# Patient Record
Sex: FEMALE | Race: WHITE | NOT HISPANIC OR LATINO
[De-identification: names, ages, dates, MRNs, and addresses within clinical notes are randomized per-mention and may not be internally consistent; named-entity substitution may affect disease eponyms.]

---

## 2021-09-29 ENCOUNTER — APPOINTMENT (OUTPATIENT)
Dept: GASTROENTEROLOGY | Facility: CLINIC | Age: 46
End: 2021-09-29
Payer: COMMERCIAL

## 2021-09-29 VITALS
WEIGHT: 177 LBS | SYSTOLIC BLOOD PRESSURE: 119 MMHG | HEART RATE: 63 BPM | HEIGHT: 63 IN | DIASTOLIC BLOOD PRESSURE: 74 MMHG | BODY MASS INDEX: 31.36 KG/M2

## 2021-09-29 PROCEDURE — 99203 OFFICE O/P NEW LOW 30 MIN: CPT

## 2021-09-29 NOTE — HISTORY OF PRESENT ILLNESS
[de-identified] : Ms. BOUCHRA PALACIOS is a 45 year old female with history of epigastric discomfort and heartburn which has been new in onset. Patient has a history of heavy NSAID use in the past. Patient has noted dyspepsia and occasional radiation of the pain to the back. Patient was started on pantoprazole with moderate improvement in symptoms although the symptoms do persist. There is no true dysphagia or weight loss. No bowel changes associated. Tomato Sauce does make symptoms worse.\par

## 2021-09-29 NOTE — ASSESSMENT
[FreeTextEntry1] : 44 yo female with epigastric discomfort. Will continue PPIs, lifestyle modifications, abdominal ultrasound, upper endoscopy.

## 2021-09-30 ENCOUNTER — APPOINTMENT (OUTPATIENT)
Dept: ULTRASOUND IMAGING | Facility: CLINIC | Age: 46
End: 2021-09-30
Payer: COMMERCIAL

## 2021-09-30 ENCOUNTER — OUTPATIENT (OUTPATIENT)
Dept: OUTPATIENT SERVICES | Facility: HOSPITAL | Age: 46
LOS: 1 days | End: 2021-09-30
Payer: COMMERCIAL

## 2021-09-30 DIAGNOSIS — R10.13 EPIGASTRIC PAIN: ICD-10-CM

## 2021-09-30 PROCEDURE — 76700 US EXAM ABDOM COMPLETE: CPT | Mod: 26

## 2021-09-30 PROCEDURE — 76700 US EXAM ABDOM COMPLETE: CPT

## 2021-10-07 ENCOUNTER — APPOINTMENT (OUTPATIENT)
Dept: CT IMAGING | Facility: CLINIC | Age: 46
End: 2021-10-07
Payer: COMMERCIAL

## 2021-10-07 ENCOUNTER — OUTPATIENT (OUTPATIENT)
Dept: OUTPATIENT SERVICES | Facility: HOSPITAL | Age: 46
LOS: 1 days | End: 2021-10-07
Payer: COMMERCIAL

## 2021-10-07 DIAGNOSIS — Z00.8 ENCOUNTER FOR OTHER GENERAL EXAMINATION: ICD-10-CM

## 2021-10-07 DIAGNOSIS — R93.3 ABNORMAL FINDINGS ON DIAGNOSTIC IMAGING OF OTHER PARTS OF DIGESTIVE TRACT: ICD-10-CM

## 2021-10-07 PROCEDURE — 74177 CT ABD & PELVIS W/CONTRAST: CPT

## 2021-10-07 PROCEDURE — 74177 CT ABD & PELVIS W/CONTRAST: CPT | Mod: 26

## 2021-10-10 ENCOUNTER — INPATIENT (INPATIENT)
Facility: HOSPITAL | Age: 46
LOS: 4 days | Discharge: HOME | DRG: 424 | End: 2021-10-15
Attending: INTERNAL MEDICINE | Admitting: INTERNAL MEDICINE
Payer: COMMERCIAL

## 2021-10-10 VITALS — WEIGHT: 177.91 LBS | HEIGHT: 63 IN

## 2021-10-10 LAB
ALBUMIN SERPL ELPH-MCNC: 3.7 G/DL — SIGNIFICANT CHANGE UP (ref 3.3–5)
ALP SERPL-CCNC: 77 U/L — SIGNIFICANT CHANGE UP (ref 40–120)
ALT FLD-CCNC: 32 U/L — SIGNIFICANT CHANGE UP (ref 12–78)
ANION GAP SERPL CALC-SCNC: 6 MMOL/L — SIGNIFICANT CHANGE UP (ref 5–17)
APTT BLD: 39.9 SEC — HIGH (ref 27.5–35.5)
AST SERPL-CCNC: 22 U/L — SIGNIFICANT CHANGE UP (ref 15–37)
BASOPHILS # BLD AUTO: 0.09 K/UL — SIGNIFICANT CHANGE UP (ref 0–0.2)
BASOPHILS NFR BLD AUTO: 1.1 % — SIGNIFICANT CHANGE UP (ref 0–2)
BILIRUB SERPL-MCNC: 0.6 MG/DL — SIGNIFICANT CHANGE UP (ref 0.2–1.2)
BUN SERPL-MCNC: 11 MG/DL — SIGNIFICANT CHANGE UP (ref 7–23)
CALCIUM SERPL-MCNC: 9.1 MG/DL — SIGNIFICANT CHANGE UP (ref 8.5–10.1)
CHLORIDE SERPL-SCNC: 103 MMOL/L — SIGNIFICANT CHANGE UP (ref 96–108)
CO2 SERPL-SCNC: 27 MMOL/L — SIGNIFICANT CHANGE UP (ref 22–31)
CREAT SERPL-MCNC: 0.69 MG/DL — SIGNIFICANT CHANGE UP (ref 0.5–1.3)
EOSINOPHIL # BLD AUTO: 0.5 K/UL — SIGNIFICANT CHANGE UP (ref 0–0.5)
EOSINOPHIL NFR BLD AUTO: 6.1 % — HIGH (ref 0–6)
GLUCOSE SERPL-MCNC: 119 MG/DL — HIGH (ref 70–99)
HCG SERPL-ACNC: 3 MIU/ML — SIGNIFICANT CHANGE UP
HCT VFR BLD CALC: 42.2 % — SIGNIFICANT CHANGE UP (ref 34.5–45)
HGB BLD-MCNC: 13.8 G/DL — SIGNIFICANT CHANGE UP (ref 11.5–15.5)
IMM GRANULOCYTES NFR BLD AUTO: 0.2 % — SIGNIFICANT CHANGE UP (ref 0–1.5)
INR BLD: 1.1 RATIO — SIGNIFICANT CHANGE UP (ref 0.88–1.16)
LIDOCAIN IGE QN: 119 U/L — SIGNIFICANT CHANGE UP (ref 73–393)
LYMPHOCYTES # BLD AUTO: 2.19 K/UL — SIGNIFICANT CHANGE UP (ref 1–3.3)
LYMPHOCYTES # BLD AUTO: 26.8 % — SIGNIFICANT CHANGE UP (ref 13–44)
MCHC RBC-ENTMCNC: 29.1 PG — SIGNIFICANT CHANGE UP (ref 27–34)
MCHC RBC-ENTMCNC: 32.7 GM/DL — SIGNIFICANT CHANGE UP (ref 32–36)
MCV RBC AUTO: 89 FL — SIGNIFICANT CHANGE UP (ref 80–100)
MONOCYTES # BLD AUTO: 0.6 K/UL — SIGNIFICANT CHANGE UP (ref 0–0.9)
MONOCYTES NFR BLD AUTO: 7.4 % — SIGNIFICANT CHANGE UP (ref 2–14)
NEUTROPHILS # BLD AUTO: 4.76 K/UL — SIGNIFICANT CHANGE UP (ref 1.8–7.4)
NEUTROPHILS NFR BLD AUTO: 58.4 % — SIGNIFICANT CHANGE UP (ref 43–77)
PLATELET # BLD AUTO: 299 K/UL — SIGNIFICANT CHANGE UP (ref 150–400)
POTASSIUM SERPL-MCNC: 3.5 MMOL/L — SIGNIFICANT CHANGE UP (ref 3.5–5.3)
POTASSIUM SERPL-SCNC: 3.5 MMOL/L — SIGNIFICANT CHANGE UP (ref 3.5–5.3)
PROT SERPL-MCNC: 7.7 GM/DL — SIGNIFICANT CHANGE UP (ref 6–8.3)
PROTHROM AB SERPL-ACNC: 12.7 SEC — SIGNIFICANT CHANGE UP (ref 10.6–13.6)
RBC # BLD: 4.74 M/UL — SIGNIFICANT CHANGE UP (ref 3.8–5.2)
RBC # FLD: 12.2 % — SIGNIFICANT CHANGE UP (ref 10.3–14.5)
SODIUM SERPL-SCNC: 136 MMOL/L — SIGNIFICANT CHANGE UP (ref 135–145)
WBC # BLD: 8.16 K/UL — SIGNIFICANT CHANGE UP (ref 3.8–10.5)
WBC # FLD AUTO: 8.16 K/UL — SIGNIFICANT CHANGE UP (ref 3.8–10.5)

## 2021-10-10 PROCEDURE — 99285 EMERGENCY DEPT VISIT HI MDM: CPT

## 2021-10-10 PROCEDURE — 93010 ELECTROCARDIOGRAM REPORT: CPT

## 2021-10-10 PROCEDURE — 71045 X-RAY EXAM CHEST 1 VIEW: CPT | Mod: 26

## 2021-10-10 RX ORDER — MORPHINE SULFATE 50 MG/1
8 CAPSULE, EXTENDED RELEASE ORAL ONCE
Refills: 0 | Status: DISCONTINUED | OUTPATIENT
Start: 2021-10-10 | End: 2021-10-10

## 2021-10-10 RX ORDER — ONDANSETRON 8 MG/1
4 TABLET, FILM COATED ORAL ONCE
Refills: 0 | Status: COMPLETED | OUTPATIENT
Start: 2021-10-10 | End: 2021-10-10

## 2021-10-10 RX ADMIN — MORPHINE SULFATE 8 MILLIGRAM(S): 50 CAPSULE, EXTENDED RELEASE ORAL at 23:21

## 2021-10-10 RX ADMIN — MORPHINE SULFATE 8 MILLIGRAM(S): 50 CAPSULE, EXTENDED RELEASE ORAL at 23:23

## 2021-10-10 RX ADMIN — ONDANSETRON 4 MILLIGRAM(S): 8 TABLET, FILM COATED ORAL at 23:21

## 2021-10-10 NOTE — ED PROVIDER NOTE - PROGRESS NOTE DETAILS
Discussed with Dr. Campbell, patient does not need repeat imaging, do not start anticoagulation, if deemed to be high risk lovenox may be started after procedure. Pt will be admitted for endoscopy tomorrow morning and will also be seen by surgeon for procedure likely later in the week. -Des Felton PA-C Discussed case with Dr. estrada who will admit. -Des Felton PA-C

## 2021-10-10 NOTE — ED PROVIDER NOTE - OBJECTIVE STATEMENT
44 y/o F presents with abdominal pain. Pt has been having upper abdominal, following w/ Dr. 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 Dr. Maldonado has been discussing with Dr. gregg for endoscopy and bx. Pt reports worsening LUQ pain since last night after trying to eat chicken, not relieved with vicodin. Denies fever/chills, v/d, CP, SOB, urinary sx or other complaints at this time. -Des Felton PA-C

## 2021-10-10 NOTE — ED PROVIDER NOTE - ATTENDING CONTRIBUTION TO CARE
Attending Contribution to Care: I, Katy Olivier, performed the initial face to face bedside interview with this patient regarding history of present illness, review of symptoms and relevant past medical, social and family history.  I completed an independent physical examination.  I was the initial provider who evaluated this patient. I have signed out the follow up of any pending tests (i.e. labs, radiological studies) to the ACP.  I have communicated the patient’s plan of care and disposition with the ACP.

## 2021-10-10 NOTE — ED PROVIDER NOTE - PHYSICAL EXAMINATION
Constitutional: uncomfortable appearing, AAOx3  Eyes: PERRLA EOMI  Head: NCAT  Mouth: MMM  Cardiac: s1s2. rrr  Lungs: CTA B/L. No accessory muscle use  Abd: soft, nd. ttp epigastric region and LUQ. no r/g/r

## 2021-10-10 NOTE — ED PROVIDER NOTE - CLINICAL SUMMARY MEDICAL DECISION MAKING FREE TEXT BOX
44 y/o F with known pancreatic mass on imagining from 10/7 presents with worsening abdominal pain, will discussed with GI, likely admit

## 2021-10-10 NOTE — ED ADULT TRIAGE NOTE - CHIEF COMPLAINT QUOTE
Pt presents to the ED c/o RUQ abdominal pain radiating to back and chest pressure since yesterday. Per patient, she got an ultrasound with Dr Maldonado on Thursday that found a mass on pancreas. Endorses nausea, denies vomiting and diarrhea.

## 2021-10-11 ENCOUNTER — RESULT REVIEW (OUTPATIENT)
Age: 46
End: 2021-10-11

## 2021-10-11 ENCOUNTER — NON-APPOINTMENT (OUTPATIENT)
Age: 46
End: 2021-10-11

## 2021-10-11 DIAGNOSIS — Z78.9 OTHER SPECIFIED HEALTH STATUS: Chronic | ICD-10-CM

## 2021-10-11 DIAGNOSIS — K86.89 OTHER SPECIFIED DISEASES OF PANCREAS: ICD-10-CM

## 2021-10-11 LAB
ANION GAP SERPL CALC-SCNC: 8 MMOL/L — SIGNIFICANT CHANGE UP (ref 5–17)
BUN SERPL-MCNC: 9 MG/DL — SIGNIFICANT CHANGE UP (ref 7–23)
CALCIUM SERPL-MCNC: 9.2 MG/DL — SIGNIFICANT CHANGE UP (ref 8.5–10.1)
CHLORIDE SERPL-SCNC: 105 MMOL/L — SIGNIFICANT CHANGE UP (ref 96–108)
CO2 SERPL-SCNC: 24 MMOL/L — SIGNIFICANT CHANGE UP (ref 22–31)
CREAT SERPL-MCNC: 0.69 MG/DL — SIGNIFICANT CHANGE UP (ref 0.5–1.3)
GLUCOSE SERPL-MCNC: 114 MG/DL — HIGH (ref 70–99)
HCT VFR BLD CALC: 42.3 % — SIGNIFICANT CHANGE UP (ref 34.5–45)
HGB BLD-MCNC: 13.9 G/DL — SIGNIFICANT CHANGE UP (ref 11.5–15.5)
MCHC RBC-ENTMCNC: 29.3 PG — SIGNIFICANT CHANGE UP (ref 27–34)
MCHC RBC-ENTMCNC: 32.9 GM/DL — SIGNIFICANT CHANGE UP (ref 32–36)
MCV RBC AUTO: 89.1 FL — SIGNIFICANT CHANGE UP (ref 80–100)
PLATELET # BLD AUTO: 256 K/UL — SIGNIFICANT CHANGE UP (ref 150–400)
POTASSIUM SERPL-MCNC: 4.1 MMOL/L — SIGNIFICANT CHANGE UP (ref 3.5–5.3)
POTASSIUM SERPL-SCNC: 4.1 MMOL/L — SIGNIFICANT CHANGE UP (ref 3.5–5.3)
RBC # BLD: 4.75 M/UL — SIGNIFICANT CHANGE UP (ref 3.8–5.2)
RBC # FLD: 12.2 % — SIGNIFICANT CHANGE UP (ref 10.3–14.5)
SARS-COV-2 RNA SPEC QL NAA+PROBE: SIGNIFICANT CHANGE UP
SODIUM SERPL-SCNC: 137 MMOL/L — SIGNIFICANT CHANGE UP (ref 135–145)
WBC # BLD: 7.77 K/UL — SIGNIFICANT CHANGE UP (ref 3.8–10.5)
WBC # FLD AUTO: 7.77 K/UL — SIGNIFICANT CHANGE UP (ref 3.8–10.5)

## 2021-10-11 PROCEDURE — 88307 TISSUE EXAM BY PATHOLOGIST: CPT

## 2021-10-11 PROCEDURE — A9579: CPT

## 2021-10-11 PROCEDURE — 74183 MRI ABD W/O CNTR FLWD CNTR: CPT | Mod: 26

## 2021-10-11 PROCEDURE — 86301 IMMUNOASSAY TUMOR CA 19-9: CPT

## 2021-10-11 PROCEDURE — 81025 URINE PREGNANCY TEST: CPT

## 2021-10-11 PROCEDURE — 88333 PATH CONSLTJ SURG CYTO XM 1: CPT | Mod: 26

## 2021-10-11 PROCEDURE — C9113: CPT

## 2021-10-11 PROCEDURE — 88305 TISSUE EXAM BY PATHOLOGIST: CPT

## 2021-10-11 PROCEDURE — 77001 FLUOROGUIDE FOR VEIN DEVICE: CPT

## 2021-10-11 PROCEDURE — 74183 MRI ABD W/O CNTR FLWD CNTR: CPT

## 2021-10-11 PROCEDURE — 71260 CT THORAX DX C+: CPT

## 2021-10-11 PROCEDURE — C1788: CPT

## 2021-10-11 PROCEDURE — 99222 1ST HOSP IP/OBS MODERATE 55: CPT | Mod: 25

## 2021-10-11 PROCEDURE — 71260 CT THORAX DX C+: CPT | Mod: 26

## 2021-10-11 PROCEDURE — 36561 INSERT TUNNELED CV CATH: CPT

## 2021-10-11 PROCEDURE — C1769: CPT

## 2021-10-11 PROCEDURE — 85027 COMPLETE CBC AUTOMATED: CPT

## 2021-10-11 PROCEDURE — U0005: CPT

## 2021-10-11 PROCEDURE — C1894: CPT

## 2021-10-11 PROCEDURE — U0003: CPT

## 2021-10-11 PROCEDURE — 76937 US GUIDE VASCULAR ACCESS: CPT

## 2021-10-11 PROCEDURE — 99223 1ST HOSP IP/OBS HIGH 75: CPT

## 2021-10-11 PROCEDURE — 88313 SPECIAL STAINS GROUP 2: CPT

## 2021-10-11 PROCEDURE — 86769 SARS-COV-2 COVID-19 ANTIBODY: CPT

## 2021-10-11 PROCEDURE — 88333 PATH CONSLTJ SURG CYTO XM 1: CPT

## 2021-10-11 PROCEDURE — 80048 BASIC METABOLIC PNL TOTAL CA: CPT

## 2021-10-11 PROCEDURE — 12345: CPT | Mod: NC,GC

## 2021-10-11 PROCEDURE — 36415 COLL VENOUS BLD VENIPUNCTURE: CPT

## 2021-10-11 PROCEDURE — 88172 CYTP DX EVAL FNA 1ST EA SITE: CPT

## 2021-10-11 PROCEDURE — 88313 SPECIAL STAINS GROUP 2: CPT | Mod: 26

## 2021-10-11 PROCEDURE — 88305 TISSUE EXAM BY PATHOLOGIST: CPT | Mod: 26

## 2021-10-11 PROCEDURE — 88307 TISSUE EXAM BY PATHOLOGIST: CPT | Mod: 26

## 2021-10-11 PROCEDURE — 43242 EGD US FINE NEEDLE BX/ASPIR: CPT

## 2021-10-11 RX ORDER — ACETAMINOPHEN 500 MG
650 TABLET ORAL EVERY 6 HOURS
Refills: 0 | Status: DISCONTINUED | OUTPATIENT
Start: 2021-10-11 | End: 2021-10-15

## 2021-10-11 RX ORDER — ACETAMINOPHEN 500 MG
1000 TABLET ORAL ONCE
Refills: 0 | Status: DISCONTINUED | OUTPATIENT
Start: 2021-10-11 | End: 2021-10-11

## 2021-10-11 RX ORDER — ONDANSETRON 8 MG/1
4 TABLET, FILM COATED ORAL ONCE
Refills: 0 | Status: DISCONTINUED | OUTPATIENT
Start: 2021-10-11 | End: 2021-10-11

## 2021-10-11 RX ORDER — ONDANSETRON 8 MG/1
4 TABLET, FILM COATED ORAL EVERY 8 HOURS
Refills: 0 | Status: DISCONTINUED | OUTPATIENT
Start: 2021-10-11 | End: 2021-10-15

## 2021-10-11 RX ORDER — SODIUM CHLORIDE 9 MG/ML
1000 INJECTION INTRAMUSCULAR; INTRAVENOUS; SUBCUTANEOUS
Refills: 0 | Status: DISCONTINUED | OUTPATIENT
Start: 2021-10-11 | End: 2021-10-15

## 2021-10-11 RX ORDER — MORPHINE SULFATE 50 MG/1
2 CAPSULE, EXTENDED RELEASE ORAL
Refills: 0 | Status: DISCONTINUED | OUTPATIENT
Start: 2021-10-11 | End: 2021-10-15

## 2021-10-11 RX ORDER — SUCRALFATE 1 G
1 TABLET ORAL
Refills: 0 | Status: DISCONTINUED | OUTPATIENT
Start: 2021-10-11 | End: 2021-10-15

## 2021-10-11 RX ORDER — LEVOTHYROXINE SODIUM 125 MCG
137 TABLET ORAL DAILY
Refills: 0 | Status: DISCONTINUED | OUTPATIENT
Start: 2021-10-11 | End: 2021-10-15

## 2021-10-11 RX ORDER — SODIUM CHLORIDE 9 MG/ML
500 INJECTION, SOLUTION INTRAVENOUS ONCE
Refills: 0 | Status: COMPLETED | OUTPATIENT
Start: 2021-10-11 | End: 2021-10-11

## 2021-10-11 RX ORDER — PANTOPRAZOLE SODIUM 20 MG/1
40 TABLET, DELAYED RELEASE ORAL DAILY
Refills: 0 | Status: DISCONTINUED | OUTPATIENT
Start: 2021-10-11 | End: 2021-10-15

## 2021-10-11 RX ORDER — SODIUM CHLORIDE 9 MG/ML
1000 INJECTION, SOLUTION INTRAVENOUS
Refills: 0 | Status: DISCONTINUED | OUTPATIENT
Start: 2021-10-11 | End: 2021-10-11

## 2021-10-11 RX ORDER — LORATADINE 10 MG/1
10 TABLET ORAL DAILY
Refills: 0 | Status: DISCONTINUED | OUTPATIENT
Start: 2021-10-11 | End: 2021-10-15

## 2021-10-11 RX ORDER — POLYETHYLENE GLYCOL 3350 17 G/17G
17 POWDER, FOR SOLUTION ORAL DAILY
Refills: 0 | Status: DISCONTINUED | OUTPATIENT
Start: 2021-10-11 | End: 2021-10-15

## 2021-10-11 RX ORDER — LANOLIN ALCOHOL/MO/W.PET/CERES
3 CREAM (GRAM) TOPICAL AT BEDTIME
Refills: 0 | Status: DISCONTINUED | OUTPATIENT
Start: 2021-10-11 | End: 2021-10-15

## 2021-10-11 RX ORDER — FENTANYL CITRATE 50 UG/ML
25 INJECTION INTRAVENOUS
Refills: 0 | Status: DISCONTINUED | OUTPATIENT
Start: 2021-10-11 | End: 2021-10-11

## 2021-10-11 RX ADMIN — Medication 1 GRAM(S): at 21:57

## 2021-10-11 RX ADMIN — MORPHINE SULFATE 2 MILLIGRAM(S): 50 CAPSULE, EXTENDED RELEASE ORAL at 12:17

## 2021-10-11 RX ADMIN — MORPHINE SULFATE 2 MILLIGRAM(S): 50 CAPSULE, EXTENDED RELEASE ORAL at 08:11

## 2021-10-11 RX ADMIN — MORPHINE SULFATE 2 MILLIGRAM(S): 50 CAPSULE, EXTENDED RELEASE ORAL at 15:46

## 2021-10-11 RX ADMIN — ONDANSETRON 4 MILLIGRAM(S): 8 TABLET, FILM COATED ORAL at 03:30

## 2021-10-11 RX ADMIN — MORPHINE SULFATE 2 MILLIGRAM(S): 50 CAPSULE, EXTENDED RELEASE ORAL at 11:01

## 2021-10-11 RX ADMIN — SODIUM CHLORIDE 500 MILLILITER(S): 9 INJECTION, SOLUTION INTRAVENOUS at 10:41

## 2021-10-11 RX ADMIN — PANTOPRAZOLE SODIUM 40 MILLIGRAM(S): 20 TABLET, DELAYED RELEASE ORAL at 11:00

## 2021-10-11 RX ADMIN — ONDANSETRON 4 MILLIGRAM(S): 8 TABLET, FILM COATED ORAL at 12:21

## 2021-10-11 RX ADMIN — Medication 1 GRAM(S): at 11:00

## 2021-10-11 RX ADMIN — ONDANSETRON 4 MILLIGRAM(S): 8 TABLET, FILM COATED ORAL at 20:27

## 2021-10-11 RX ADMIN — MORPHINE SULFATE 2 MILLIGRAM(S): 50 CAPSULE, EXTENDED RELEASE ORAL at 03:30

## 2021-10-11 RX ADMIN — MORPHINE SULFATE 2 MILLIGRAM(S): 50 CAPSULE, EXTENDED RELEASE ORAL at 19:04

## 2021-10-11 RX ADMIN — MORPHINE SULFATE 2 MILLIGRAM(S): 50 CAPSULE, EXTENDED RELEASE ORAL at 03:33

## 2021-10-11 RX ADMIN — SODIUM CHLORIDE 75 MILLILITER(S): 9 INJECTION, SOLUTION INTRAVENOUS at 10:41

## 2021-10-11 RX ADMIN — MORPHINE SULFATE 2 MILLIGRAM(S): 50 CAPSULE, EXTENDED RELEASE ORAL at 15:09

## 2021-10-11 RX ADMIN — MORPHINE SULFATE 2 MILLIGRAM(S): 50 CAPSULE, EXTENDED RELEASE ORAL at 23:15

## 2021-10-11 RX ADMIN — MORPHINE SULFATE 2 MILLIGRAM(S): 50 CAPSULE, EXTENDED RELEASE ORAL at 12:45

## 2021-10-11 NOTE — PROGRESS NOTE ADULT - ASSESSMENT
45 year old female with PMHx of hypothyroidism who presented for abdominal pain found to have a pancreatic tail mass  1. 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   - CT chest performed - no evidence of metastatic disease of lung or other pathology.   - MRI/MRCP performed -  Distal pancreatic body/tail mass observed. Celiac artery contact less than 180 degrees. Left adrenal gland contact is suggested. Perineural spread into the left celiac ganglion is suggested. There is   no evidence of hepatic metastatic disease.  - Oncology referral for lesion - spoke with Dr. Mendez regarding splenic artery thrombosis: rec not putting patient on AC at this time. wants to wait for FNA pathology results. Suspects thrombosis 2/2 to splenic compression.  - Surgical oncology already aware of patient.  - Ordered CA 19-9    - GI consult - Dr. Campbell; pending GI recs.     2. Splenic artery thrombosis   - No anticoagulation as per Dr. Mendez  - Recs as above     3. Scattered pulmonary opacities   - Patient is afebrile, no WBC - will not treat with abx as this time as no evidence of infectious process   - No metastatic disease evident: CT Chest negative for lung metastasis. EGD showed no hepatic metastasis.   - Heme/Onc evaluation - Dr. Mendez, recs listed above    4. History of hypothyroidism   - c/w home medications     DVT ppx: SCDs   Code status: Full code (pt agrees to chest compressions and intubation if required)

## 2021-10-11 NOTE — H&P ADULT - NSHPREVIEWOFSYSTEMS_GEN_ALL_CORE
Constitutional: negative for fatigue, negative for fever, negative for chills, negative for decreased appetite.  Skin: negative for rashes, negative for open wounds, negative for jaundice.   Eyes: negative for blurry vision, negative for double vision.   Ears, nose, throat: negative for ear pain, negative for nasal congestion, negative for sore throat, negative for lymph node swelling.   Cardiovascular: negative for chest pain, negative for palpitations, negative for lower extremity swelling.   Respiratory: negative for shortness of breath, negative for wheezing, negative for cough.   Gastrointestinal: positive for abdominal pain, positive for nausea, positive for dry heaves, negative for vomiting, negative for diarrhea, positive for constipation, negative for blood in the stool, negative for black tarry stools.   Genitourinary: negative for burning on urination, negative for urinary urgency or frequency, negative for blood in the urine.   Endocrine: negative for cold intolerance, negative for heat intolerance, negative for increased thirst.   Neurological: negative for dizziness, negative for headaches, negative for loss of consciousness, negative for motor weakness, negative for sensory deficits.   Psychiatric: negative for depression, negative for anxiety.

## 2021-10-11 NOTE — H&P ADULT - ASSESSMENT
46 y/o F presents for abdominal pain     1. 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 Campbell    2. Splenic artery thrombosis   - No anticoagulation as per Dr. Campbell  - Consider starting Lovenox after EGD     3. Scattered pulmonary opacities   - Patient is afebrile, no WBC - will not treat with abx as this time as no evidence of infectious process   - Concern for metastatic disease? May need CT chest for further evaluation   - Heme/Onc evaluation - Dr. Mendez     4. History of hypothyroidism   - c/w home medications     DVT ppx: SCDs   Code status: Full code (pt agrees to chest compressions and intubation if required)

## 2021-10-11 NOTE — CONSULT NOTE ADULT - ASSESSMENT
45 year old woman with hypothyroidism with abodminal pain found to have mass in tail of pancreas.     Plan for EUS FNB of pancreatic tail mass. Patient has agreed to [employer] research protocol. Rec's to follow.
46 y/o female presented with  abdominal pain x one month. Imaging showed pacreatic tail mass. Per CT imaging :  possible MCN/IPMN but does not rule out pancreatic adenocarcinoma.  S/o EUS / FNA.  MR MRCP - already ordered by Dr Campbell.  Ca 19-9 pending.  Surgical oncology evaluation- Dr Howell will see patient  tomorrow.  Follow path results.  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  AC for splenic thrombosis is indicated in selected patients only to prevent progression and promote recanalization. Would hold off with AC for next 24-48  hours  - while awaiting pathology/ MRCP  and surgical recommendations. If upfront surgery recommended- AC will not be necessary.

## 2021-10-11 NOTE — PROGRESS NOTE ADULT - SUBJECTIVE AND OBJECTIVE BOX
45 year old female with a PMHx of hypothyroidism presented to the ED for abdominal pain which began 1 month ago. Patient was worked up by her gastroenterologist Dr. Maldonado who ordered an abdominal ultrasound. Abd U/S at that time showed a pancreatic mass. Subsequent CT abd/pelvis showed a pancreatic tail mass with splenic encasement. Patient was scheduled for a EGD with biopsy with Dr. Campbell however patient was in acute distress and presented to ED on 10/10. Pain is located in the LUQ which occasional radiation to the back. Pain is associated with epigastric discomfort. Patient unable to tolerate food but has been attempting to drink smoothies. Reports nausea, fatigue, dry heaving, prandial intolerance, minimal bowel movements and constipation. Denies fevers, chills, cough, SOB, lower extremity swelling. Of note, patient had been on Trulicity for 2 years and on Qsyima from April to July for weight loss with only minor fluctuate in weight.     Imaging:   - CXR: no consolidation, effusion, or pneumothorax (personally reviewed).   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 Differential diagnosis would include solid pseudopapillary epithelial neoplasm and mucinous tumor. Recommend further evaluation with MRI. Scattered pulmonary opacities which may represent multifocal infection.     PHYSICAL EXAM:  Vital Signs Last 24 Hrs  T(C): 36.6 (11 Oct 2021 16:00), Max: 37 (10 Oct 2021 22:42)  T(F): 97.8 (11 Oct 2021 16:00), Max: 98.6 (10 Oct 2021 22:42)  HR: 73 (11 Oct 2021 16:00) (61 - 76)  BP: 104/63 (11 Oct 2021 16:00) (86/61 - 111/75)  BP(mean): 87 (10 Oct 2021 22:42) (87 - 87)  RR: 20 (11 Oct 2021 16:00) (13 - 20)  SpO2: 90% (11 Oct 2021 16:00) (90% - 99%)      MEDICATIONS:  MEDICATIONS  (STANDING):  levothyroxine 137 MICROGram(s) Oral daily  pantoprazole  Injectable 40 milliGRAM(s) IV Push daily  sodium chloride 0.9%. 1000 milliLiter(s) (75 mL/Hr) IV Continuous <Continuous>  sucralfate 1 Gram(s) Oral two times a day      LABS: All Labs Reviewed:                        13.9   7.77  )-----------( 256      ( 11 Oct 2021 14:32 )             42.3     10-11    137  |  105  |  9   ----------------------------<  114<H>  4.1   |  24  |  0.69    Ca    9.2      11 Oct 2021 14:32    TPro  7.7  /  Alb  3.7  /  TBili  0.6  /  DBili  x   /  AST  22  /  ALT  32  /  AlkPhos  77  10-10

## 2021-10-11 NOTE — CONSULT NOTE ADULT - SUBJECTIVE AND OBJECTIVE BOX
fuyll note to follow. panc mass for FNA Patient is a 45y old  Female who presents with a chief complaint of Abdominal pain (11 Oct 2021 09:49)    45 year old woman with hypothyroidism with abodminal pain found to have mass in tail of pancreas.     Patient states she has had worsening abdominal pain for the past few weeks. Pain is a fullness, diffuse. Associated with bloating and inability to eat full meals. She lost her appetite and has not been able to eat anything but toast over the last few days. No alleviating factors, fullness/bloating can be 7/10. Exacerbating factor is eating. No injury to the area. +weight loss of about 5 pounds. No fevers or chills. No Travel history or sick contacts. She saw Dr. Maldonado who got imaging and showed a mass. She was supposed to see me urgently but her symptoms got worse so she came to the emergency room.       PAST MEDICAL & SURGICAL HISTORY:  Hypothyroid    No pertinent past surgical history        MEDICATIONS  (STANDING):  levothyroxine 137 MICROGram(s) Oral daily  pantoprazole  Injectable 40 milliGRAM(s) IV Push daily  sucralfate 1 Gram(s) Oral two times a day    MEDICATIONS  (PRN):  acetaminophen   Tablet .. 650 milliGRAM(s) Oral every 6 hours PRN Temp greater or equal to 38C (100.4F), Mild Pain (1 - 3)  aluminum hydroxide/magnesium hydroxide/simethicone Suspension 30 milliLiter(s) Oral every 4 hours PRN Dyspepsia  loratadine 10 milliGRAM(s) Oral daily PRN allergies  melatonin 3 milliGRAM(s) Oral at bedtime PRN Insomnia  morphine  - Injectable 2 milliGRAM(s) IV Push every 2 hours PRN Severe Pain (7 - 10)  ondansetron Injectable 4 milliGRAM(s) IV Push every 8 hours PRN Nausea and/or Vomiting  polyethylene glycol 3350 17 Gram(s) Oral daily PRN Constipation      Allergies    No Known Allergies    Intolerances      SOCIAL HISTORY: no smoking, drinking or drugs    FAMILY HISTORY:  FH: thyroid cancer (Mother)    Family history of cancer of tongue (Mother)    FH: asthma (Father)    FH: lupus (Mother)        REVIEW OF SYSTEMS:    CONSTITUTIONAL: No weakness, fevers or chills  EYES/ENT: No visual changes;  No vertigo or throat pain   NECK: No pain or stiffness  RESPIRATORY: No cough, wheezing, hemoptysis; No shortness of breath  CARDIOVASCULAR: No chest pain or palpitations  GASTROINTESTINAL: per HPI  GENITOURINARY: No dysuria, frequency or hematuria  NEUROLOGICAL: No numbness or weakness  SKIN: No itching, burning, rashes, or lesions   PSYCH: Normal mood and affect  All other review of systems is negative unless indicated above.    Vital Signs Last 24 Hrs  T(C): 36.8 (11 Oct 2021 04:38), Max: 37 (10 Oct 2021 22:42)  T(F): 98.2 (11 Oct 2021 04:38), Max: 98.6 (10 Oct 2021 22:42)  HR: 63 (11 Oct 2021 04:38) (63 - 72)  BP: 102/65 (11 Oct 2021 04:38) (102/65 - 111/75)  BP(mean): 87 (10 Oct 2021 22:42) (87 - 87)  RR: 18 (11 Oct 2021 04:38) (18 - 18)  SpO2: 94% (11 Oct 2021 04:38) (94% - 97%)    PHYSICAL EXAM:    Constitutional: No acute distress, well-developed, non-toxic appearing  HEENT: masked, good phonation, not icteric  Neck: supple, no lymphadenopathy  Respiratory: clear to ascultation bilaterally, no wheezing  Cardiovascular: S1 and S2, regular rate and rhythm, no murmurs rubs or gallops  Gastrointestinal: soft, tender to deep palpation, non-distended, +bowel sounds, no rebound or guarding, no surgical scars, no drains  Extremities: No peripheral edema, no cyanosis or clubbing  Vascular: 2+ peripheral pulses, no venous stasis  Neurological: A/O x 3, no focal deficits, no asterixis  Psychiatric: Normal mood, normal affect  Skin: No rashes, not jaundiced    LABS:                        13.8   8.16  )-----------( 299      ( 10 Oct 2021 23:08 )             42.2     10-10    136  |  103  |  11  ----------------------------<  119<H>  3.5   |  27  |  0.69    Ca    9.1      10 Oct 2021 23:08    TPro  7.7  /  Alb  3.7  /  TBili  0.6  /  DBili  x   /  AST  22  /  ALT  32  /  AlkPhos  77  10-10    PT/INR - ( 10 Oct 2021 23:08 )   PT: 12.7 sec;   INR: 1.10 ratio         PTT - ( 10 Oct 2021 23:08 )  PTT:39.9 sec  LIVER FUNCTIONS - ( 10 Oct 2021 23:08 )  Alb: 3.7 g/dL / Pro: 7.7 gm/dL / ALK PHOS: 77 U/L / ALT: 32 U/L / AST: 22 U/L / GGT: x             RADIOLOGY & ADDITIONAL STUDIES: CT abdomen reviewed in detail on PACS, U/S reviewed

## 2021-10-11 NOTE — CONSULT NOTE ADULT - SUBJECTIVE AND OBJECTIVE BOX
HPI:  46 y/o F with PMH hypothyroidism presents for abdominal pain. Patient began having abdominal pain 1 month ago. She went to her PCP initially and workup was negative, so she followed up with her gastroenterologist Dr. Maldonado who ordered an abdominal ultrasound. It showed a mass in the pancreas. She went for subsequent CT abd/pelvis which showed a pancreatic tail mass encasing the spleen. She was going to set up an EGD with biopsy with Dr. Campbell, but her pain acutely worsened on the day of admission. Pain located in the LUQ which is excruciating and extends to her back at times, she also has a discomfort in the epigastric region. Unable to tolerate food, has been drinking smoothies. Reports nausea,  fatigue, dry heaving, unbearable pain with eating food, minimal bowel movements, and constipation. Denies fevers, chills, cough, SOB, lower extremity swelling. She had been on Trulicity for 2 years and Qsyima from April to July for weight loss. She reports weight only fluctuating a few lbs.     ER course: VSS. Labs: CBC unremarkable, eosinophils 6.1%. CMP: Glucose 119. EKG: NSR with HR 62 bpm, normal intervals, no ST segment changes (personally reviewed).     Imaging:   - CXR: no consolidation, effusion, or pneumothorax (personally reviewed).   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 Differential diagnosis would include solid pseudopapillary epithelial neoplasm and mucinous tumor. Recommend further evaluation with MRI. Scattered pulmonary opacities which may represent multifocal infection.     The case was discussed with Dr. Campbell by the ER physician. He recommended not starting anticoagulation, she is being admitted for EGD in AM. Pt was given morphine and zofran. She is admitted to med/surg for further management.    (11 Oct 2021 02:36)    PAST MEDICAL & SURGICAL HISTORY:  Hypothyroid    No pertinent past surgical history      FAMILY HISTORY:  FH: thyroid cancer (Mother)    Family history of cancer of tongue (Mother)    FH: asthma (Father)    FH: lupus (Mother)      Vital Signs Last 24 Hrs  T(C): 36.8 (11 Oct 2021 04:38), Max: 37 (10 Oct 2021 22:42)  T(F): 98.2 (11 Oct 2021 04:38), Max: 98.6 (10 Oct 2021 22:42)  HR: 63 (11 Oct 2021 04:38) (63 - 72)  BP: 102/65 (11 Oct 2021 04:38) (102/65 - 111/75)  BP(mean): 87 (10 Oct 2021 22:42) (87 - 87)  RR: 18 (11 Oct 2021 04:38) (18 - 18)  SpO2: 94% (11 Oct 2021 04:38) (94% - 97%)    Currently patient off the floor for EUS/ Bx                           13.8   8.16  )-----------( 299      ( 10 Oct 2021 23:08 )             42.2   10-10    136  |  103  |  11  ----------------------------<  119<H>  3.5   |  27  |  0.69    Ca    9.1      10 Oct 2021 23:08    TPro  7.7  /  Alb  3.7  /  TBili  0.6  /  DBili  x   /  AST  22  /  ALT  32  /  AlkPhos  77  10-10   HPI:  46 y/o F with PMH hypothyroidism presents for abdominal pain. Patient began having abdominal pain 1 month ago. She went to her PCP initially and workup was negative, so she followed up with her gastroenterologist Dr. Maldonado who ordered an abdominal ultrasound. It showed a mass in the pancreas. She went for subsequent CT abd/pelvis which showed a pancreatic tail mass encasing the spleen. She was going to set up an EGD with biopsy with Dr. Campbell, but her pain acutely worsened on the day of admission. Pain located in the LUQ which is excruciating and extends to her back at times, she also has a discomfort in the epigastric region. Unable to tolerate food, has been drinking smoothies. Reports nausea,  fatigue, dry heaving, unbearable pain with eating food, minimal bowel movements, and constipation. Denies fevers, chills, cough, SOB, lower extremity swelling. She had been on Trulicity for 2 years and Qsyima from April to July for weight loss. She reports weight only fluctuating a few lbs.     ER course: VSS. Labs: CBC unremarkable, eosinophils 6.1%. CMP: Glucose 119. EKG: NSR with HR 62 bpm, normal intervals, no ST segment changes (personally reviewed).     Imaging:   - CXR: no consolidation, effusion, or pneumothorax (personally reviewed).   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 Differential diagnosis would include solid pseudopapillary epithelial neoplasm and mucinous tumor. Recommend further evaluation with MRI. Scattered pulmonary opacities which may represent multifocal infection.     Patient  is currently off the floor for  EUS/ biopsy.    PAST MEDICAL & SURGICAL HISTORY:  Hypothyroid    No pertinent past surgical history      FAMILY HISTORY:  FH: thyroid cancer (Mother)    Family history of cancer of tongue (Mother)    FH: asthma (Father)    FH: lupus (Mother)      Vital Signs Last 24 Hrs  T(C): 36.8 (11 Oct 2021 04:38), Max: 37 (10 Oct 2021 22:42)  T(F): 98.2 (11 Oct 2021 04:38), Max: 98.6 (10 Oct 2021 22:42)  HR: 63 (11 Oct 2021 04:38) (63 - 72)  BP: 102/65 (11 Oct 2021 04:38) (102/65 - 111/75)  BP(mean): 87 (10 Oct 2021 22:42) (87 - 87)  RR: 18 (11 Oct 2021 04:38) (18 - 18)  SpO2: 94% (11 Oct 2021 04:38) (94% - 97%)    Currently patient off the floor for EUS/ Bx                           13.8   8.16  )-----------( 299      ( 10 Oct 2021 23:08 )             42.2   10-10    136  |  103  |  11  ----------------------------<  119<H>  3.5   |  27  |  0.69    Ca    9.1      10 Oct 2021 23:08    TPro  7.7  /  Alb  3.7  /  TBili  0.6  /  DBili  x   /  AST  22  /  ALT  32  /  AlkPhos  77  10-10   HPI:  46 y/o F with PMH hypothyroidism presents for abdominal pain. Patient began having abdominal pain 1 month ago. She went to her PCP initially and workup was negative, so she followed up with her gastroenterologist Dr. Maldonado who ordered an abdominal ultrasound. It showed a mass in the pancreas. She went for subsequent CT abd/pelvis which showed a pancreatic tail mass encasing the spleen. She was going to set up an EGD with biopsy with Dr. Campbell, but her pain acutely worsened on the day of admission. Pain located in the LUQ which is excruciating and extends to her back at times, she also has a discomfort in the epigastric region. Unable to tolerate food, has been drinking smoothies. Reports nausea,  fatigue, dry heaving, unbearable pain with eating food, minimal bowel movements, and constipation. Denies fevers, chills, cough, SOB, lower extremity swelling. She had been on Trulicity for 2 years and Qsyima from April to July for weight loss. She reports weight only fluctuating a few lbs.     ER course: VSS. Labs: CBC unremarkable, eosinophils 6.1%. CMP: Glucose 119. EKG: NSR with HR 62 bpm, normal intervals, no ST segment changes (personally reviewed).     Imaging:   - CXR: no consolidation, effusion, or pneumothorax (personally reviewed).   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 Differential diagnosis would include solid pseudopapillary epithelial neoplasm and mucinous tumor. Recommend further evaluation with MRI. Scattered pulmonary opacities which may represent multifocal infection.         PAST MEDICAL & SURGICAL HISTORY:  Hypothyroid    No pertinent past surgical history      FAMILY HISTORY:  FH: thyroid cancer (Mother)    Family history of cancer of tongue (Mother)    FH: asthma (Father)    FH: lupus (Mother)      Vital Signs Last 24 Hrs  T(C): 36.8 (11 Oct 2021 04:38), Max: 37 (10 Oct 2021 22:42)  T(F): 98.2 (11 Oct 2021 04:38), Max: 98.6 (10 Oct 2021 22:42)  HR: 63 (11 Oct 2021 04:38) (63 - 72)  BP: 102/65 (11 Oct 2021 04:38) (102/65 - 111/75)  BP(mean): 87 (10 Oct 2021 22:42) (87 - 87)  RR: 18 (11 Oct 2021 04:38) (18 - 18)  SpO2: 94% (11 Oct 2021 04:38) (94% - 97%)    Pleasant female NAD. HEENT  normal  Neck no masses  No NIDIA  Lungs clear  Heart RRR  Abd slightly  tender epigastric LUQ BS +  No pedal edema  Neuro non focal  Psych normal affect                           13.8   8.16  )-----------( 299      ( 10 Oct 2021 23:08 )             42.2   10-10    136  |  103  |  11  ----------------------------<  119<H>  3.5   |  27  |  0.69    Ca    9.1      10 Oct 2021 23:08    TPro  7.7  /  Alb  3.7  /  TBili  0.6  /  DBili  x   /  AST  22  /  ALT  32  /  AlkPhos  77  10-10

## 2021-10-11 NOTE — PROGRESS NOTE ADULT - ASSESSMENT
Pt has been seen and examined with FP resident, resident supervised agree with a/p       Patient is a 45y old  Female who presents with a chief complaint of Abdominal pain (11 Oct 2021 09:49)      HPI:  44 y/o F with PMH hypothyroidism presents for abdominal pain. Patient began having abdominal pain 1 month ago. She went to her PCP initially and workup was negative, so she followed up with her gastroenterologist Dr. Maldonado who ordered an abdominal ultrasound. It showed a mass in the pancreas. She went for subsequent CT abd/pelvis which showed a pancreatic tail mass encasing the spleen. She was going to set up an EGD with biopsy with Dr. Campbell, but her pain acutely worsened on the day of admission. Pain located in the LUQ which is excruciating and extends to her back at times, she also has a discomfort in the epigastric region. Unable to tolerate food, has been drinking smoothies. Reports nausea,  fatigue, dry heaving, unbearable pain with eating food, minimal bowel movements, and constipation. Denies fevers, chills, cough, SOB, lower extremity swelling. She had been on Trulicity for 2 years and Qsyima from April to July for weight loss. She reports weight only fluctuating a few lbs.             PHYSICAL EXAM:  Vital Signs Last 24 Hrs  T(C): 36.1 (11 Oct 2021 10:45), Max: 37 (10 Oct 2021 22:42)  T(F): 97 (11 Oct 2021 10:45), Max: 98.6 (10 Oct 2021 22:42)  HR: 67 (11 Oct 2021 11:19) (61 - 76)  BP: 102/66 (11 Oct 2021 11:19) (86/61 - 111/75)  BP(mean): 87 (10 Oct 2021 22:42) (87 - 87)  RR: 18 (11 Oct 2021 11:19) (13 - 18)  SpO2: 97% (11 Oct 2021 11:19) (94% - 99%)  -rs-aeeb, cta  -cvs-s1s2 normal   -p/a-soft,bs+      A/P    #iv fluids, supportive care, MRI and CT chest to follow     #dvt pr

## 2021-10-11 NOTE — H&P ADULT - HISTORY OF PRESENT ILLNESS
46 y/o F with PMH hypothyroidism presents for abdominal pain. Patient began having abdominal pain 1 month ago. She went to her PCP initially and workup was negative, so she followed up with her gastroenterologist Dr. Maldonado who ordered an abdominal ultrasound. It showed a mass in the pancreas. She went for subsequent CT abd/pelvis which showed a pancreatic tail mass encasing the spleen. She was going to set up an EGD with biopsy with Dr. Campbell, but her pain acutely worsened on the day of admission. Pain located in the LUQ which is excruciating and extends to her back at times, she also has a discomfort in the epigastric region. Unable to tolerate food, has been drinking smoothies. Reports nausea,  fatigue, dry heaving, unbearable pain with eating food, minimal bowel movements, and constipation. Denies fevers, chills, cough, SOB, lower extremity swelling. She had been on Trulicity for 2 years and Qsyima from April to July for weight loss. She reports weight only fluctuating a few lbs.     ER course: VSS. Labs: CBC unremarkable, eosinophils 6.1%. CMP: Glucose 119. EKG: NSR with HR 62 bpm, normal intervals, no ST segment changes (personally reviewed).     Imaging:   - CXR: no consolidation, effusion, or pneumothorax (personally reviewed).   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 Differential diagnosis would include solid pseudopapillary epithelial neoplasm and mucinous tumor. Recommend further evaluation with MRI. Scattered pulmonary opacities which may represent multifocal infection.     The case was discussed with Dr. Campbell by the ER physician. He recommended not starting anticoagulation, she is being admitted for EGD in AM. Pt was given morphine and zofran. She is admitted to med/surg for further management.

## 2021-10-11 NOTE — H&P ADULT - NSICDXFAMILYHX_GEN_ALL_CORE_FT
FAMILY HISTORY:  Father  Still living? Unknown  FH: asthma, Age at diagnosis: Age Unknown    Mother  Still living? Unknown  Family history of cancer of tongue, Age at diagnosis: Age Unknown  FH: lupus, Age at diagnosis: Age Unknown  FH: thyroid cancer, Age at diagnosis: Age Unknown

## 2021-10-11 NOTE — H&P ADULT - NSHPPHYSICALEXAM_GEN_ALL_CORE
ICU Vital Signs Last 24 Hrs  T(C): 37 (10 Oct 2021 22:42), Max: 37 (10 Oct 2021 22:42)  T(F): 98.6 (10 Oct 2021 22:42), Max: 98.6 (10 Oct 2021 22:42)  HR: 72 (10 Oct 2021 22:42) (72 - 72)  BP: 111/75 (10 Oct 2021 22:42) (111/75 - 111/75)  BP(mean): 87 (10 Oct 2021 22:42) (87 - 87)  RR: 18 (10 Oct 2021 22:42) (18 - 18)  SpO2: 97% (10 Oct 2021 22:42) (97% - 97%)    General: Awake and alert, cooperative with exam. No acute distress.   Skin: Warm, dry, and pink.   Eyes: Pupils equal and reactive to light. Extraocular eye movements intact. No conjunctival injection, discharge, or scleral icterus.   HEENT: Atraumatic, normocephalic. Moist mucus membranes.   Cardiology: Normal S1, S2. No murmurs, rubs, or gallops. Regular rate and rhythm.   Respiratory: Lungs clear to ascultation bilaterally. Good air exchange. No wheezes, rales, or rhonchi. Normal chest expansion.   Gastrointestinal: Positive bowel sounds. Tenderness on palpation of epigastric region and LUQ. Soft, non-distended. No guarding, rigidity, or rebound tenderness. No hepatosplenomegaly.   Musculoskeletal: 5/5 motor strength in all extremities. Normal range of motion.   Extremities: No peripheral edema bilaterally. Dorsalis pedis pulses 2+ bilaterally.   Neurological: A+Ox3 (person, place, and time). Cranial nerves 2-12 intact. Normal speech. No facial droop. No focal neurological deficits.   Psychiatric: Normal affect. Normal mood.

## 2021-10-12 PROBLEM — E03.9 HYPOTHYROIDISM, UNSPECIFIED: Chronic | Status: ACTIVE | Noted: 2021-10-10

## 2021-10-12 LAB
ANION GAP SERPL CALC-SCNC: 9 MMOL/L — SIGNIFICANT CHANGE UP (ref 5–17)
BUN SERPL-MCNC: 9 MG/DL — SIGNIFICANT CHANGE UP (ref 7–23)
CALCIUM SERPL-MCNC: 9 MG/DL — SIGNIFICANT CHANGE UP (ref 8.5–10.1)
CANCER AG19-9 SERPL-ACNC: 221 U/ML — HIGH
CHLORIDE SERPL-SCNC: 104 MMOL/L — SIGNIFICANT CHANGE UP (ref 96–108)
CO2 SERPL-SCNC: 24 MMOL/L — SIGNIFICANT CHANGE UP (ref 22–31)
COVID-19 SPIKE DOMAIN AB INTERP: NEGATIVE — SIGNIFICANT CHANGE UP
COVID-19 SPIKE DOMAIN ANTIBODY RESULT: 0.4 U/ML — SIGNIFICANT CHANGE UP
CREAT SERPL-MCNC: 0.7 MG/DL — SIGNIFICANT CHANGE UP (ref 0.5–1.3)
GLUCOSE SERPL-MCNC: 85 MG/DL — SIGNIFICANT CHANGE UP (ref 70–99)
HCT VFR BLD CALC: 41.8 % — SIGNIFICANT CHANGE UP (ref 34.5–45)
HGB BLD-MCNC: 13.5 G/DL — SIGNIFICANT CHANGE UP (ref 11.5–15.5)
MCHC RBC-ENTMCNC: 29.2 PG — SIGNIFICANT CHANGE UP (ref 27–34)
MCHC RBC-ENTMCNC: 32.3 GM/DL — SIGNIFICANT CHANGE UP (ref 32–36)
MCV RBC AUTO: 90.3 FL — SIGNIFICANT CHANGE UP (ref 80–100)
PLATELET # BLD AUTO: 249 K/UL — SIGNIFICANT CHANGE UP (ref 150–400)
POTASSIUM SERPL-MCNC: 3.6 MMOL/L — SIGNIFICANT CHANGE UP (ref 3.5–5.3)
POTASSIUM SERPL-SCNC: 3.6 MMOL/L — SIGNIFICANT CHANGE UP (ref 3.5–5.3)
RBC # BLD: 4.63 M/UL — SIGNIFICANT CHANGE UP (ref 3.8–5.2)
RBC # FLD: 12.3 % — SIGNIFICANT CHANGE UP (ref 10.3–14.5)
SARS-COV-2 IGG+IGM SERPL QL IA: 0.4 U/ML — SIGNIFICANT CHANGE UP
SARS-COV-2 IGG+IGM SERPL QL IA: NEGATIVE — SIGNIFICANT CHANGE UP
SODIUM SERPL-SCNC: 137 MMOL/L — SIGNIFICANT CHANGE UP (ref 135–145)
WBC # BLD: 8.35 K/UL — SIGNIFICANT CHANGE UP (ref 3.8–10.5)
WBC # FLD AUTO: 8.35 K/UL — SIGNIFICANT CHANGE UP (ref 3.8–10.5)

## 2021-10-12 PROCEDURE — 99233 SBSQ HOSP IP/OBS HIGH 50: CPT | Mod: GC

## 2021-10-12 PROCEDURE — 99232 SBSQ HOSP IP/OBS MODERATE 35: CPT

## 2021-10-12 RX ORDER — HYDROMORPHONE HYDROCHLORIDE 2 MG/ML
0.5 INJECTION INTRAMUSCULAR; INTRAVENOUS; SUBCUTANEOUS
Refills: 0 | Status: DISCONTINUED | OUTPATIENT
Start: 2021-10-12 | End: 2021-10-14

## 2021-10-12 RX ORDER — ESCITALOPRAM OXALATE 10 MG/1
10 TABLET, FILM COATED ORAL DAILY
Refills: 0 | Status: DISCONTINUED | OUTPATIENT
Start: 2021-10-12 | End: 2021-10-15

## 2021-10-12 RX ADMIN — HYDROMORPHONE HYDROCHLORIDE 0.5 MILLIGRAM(S): 2 INJECTION INTRAMUSCULAR; INTRAVENOUS; SUBCUTANEOUS at 22:07

## 2021-10-12 RX ADMIN — PANTOPRAZOLE SODIUM 40 MILLIGRAM(S): 20 TABLET, DELAYED RELEASE ORAL at 09:41

## 2021-10-12 RX ADMIN — MORPHINE SULFATE 2 MILLIGRAM(S): 50 CAPSULE, EXTENDED RELEASE ORAL at 06:20

## 2021-10-12 RX ADMIN — SODIUM CHLORIDE 75 MILLILITER(S): 9 INJECTION INTRAMUSCULAR; INTRAVENOUS; SUBCUTANEOUS at 21:33

## 2021-10-12 RX ADMIN — MORPHINE SULFATE 2 MILLIGRAM(S): 50 CAPSULE, EXTENDED RELEASE ORAL at 15:31

## 2021-10-12 RX ADMIN — HYDROMORPHONE HYDROCHLORIDE 0.5 MILLIGRAM(S): 2 INJECTION INTRAMUSCULAR; INTRAVENOUS; SUBCUTANEOUS at 23:36

## 2021-10-12 RX ADMIN — HYDROMORPHONE HYDROCHLORIDE 0.5 MILLIGRAM(S): 2 INJECTION INTRAMUSCULAR; INTRAVENOUS; SUBCUTANEOUS at 12:26

## 2021-10-12 RX ADMIN — Medication 1 GRAM(S): at 21:33

## 2021-10-12 RX ADMIN — MORPHINE SULFATE 2 MILLIGRAM(S): 50 CAPSULE, EXTENDED RELEASE ORAL at 11:03

## 2021-10-12 RX ADMIN — MORPHINE SULFATE 2 MILLIGRAM(S): 50 CAPSULE, EXTENDED RELEASE ORAL at 11:50

## 2021-10-12 RX ADMIN — HYDROMORPHONE HYDROCHLORIDE 0.5 MILLIGRAM(S): 2 INJECTION INTRAMUSCULAR; INTRAVENOUS; SUBCUTANEOUS at 12:55

## 2021-10-12 RX ADMIN — ONDANSETRON 4 MILLIGRAM(S): 8 TABLET, FILM COATED ORAL at 06:20

## 2021-10-12 RX ADMIN — HYDROMORPHONE HYDROCHLORIDE 0.5 MILLIGRAM(S): 2 INJECTION INTRAMUSCULAR; INTRAVENOUS; SUBCUTANEOUS at 18:57

## 2021-10-12 RX ADMIN — MORPHINE SULFATE 2 MILLIGRAM(S): 50 CAPSULE, EXTENDED RELEASE ORAL at 16:00

## 2021-10-12 RX ADMIN — Medication 137 MICROGRAM(S): at 06:04

## 2021-10-12 NOTE — PROGRESS NOTE ADULT - ASSESSMENT
46 y/o female presented with  abdominal pain x one month. Imaging showed pacreatic tail mass. S/o EUS / FNA.  Based on MR imaging- likely borderline resectable pancreatic cancer.  She was evaluated by surgical oncology evaluation- Dr Howell.  Follow path results.  Will discuss patient's management plan at multidisciplinary GI oncology tumor board this Thursday. Likely  neoadjuvant chemotherapy ( Folfirinox), followed by surgery. Possible preoperative radiation in addition to chemo ( to be discussed).  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 . No need for anticoagulation- d/w Dr Howell  [facility]  Pain management. Celiac plexus block tomorrow.  Follow up in outpatient with oncology and surgical oncology on Thursday 10/14.

## 2021-10-12 NOTE — PROGRESS NOTE ADULT - ATTENDING COMMENTS
-rs-aeeb, cta  -p/a-soft, bs+  -cvs-s1s2 normal     A/P    # Pain control, supportive care, Mediport placement today    #dvt pr     #emotional and spiritual support provided to pt, all questions have been answered

## 2021-10-12 NOTE — PROGRESS NOTE ADULT - ASSESSMENT
45 year old woman with 4.5 cm mass in tail of pancreas, borderline resectable.     Plan will be for port placement.   Tomorrow I have her on the schedule for EUS with celiac block. I will avoid a lysis as patient going for OR soon. If it doesn't work then I will perform a lysis.   NPO at midnight.

## 2021-10-12 NOTE — PROGRESS NOTE ADULT - SUBJECTIVE AND OBJECTIVE BOX
45 year old female with a PMHx of hypothyroidism presented to the ED for abdominal pain which began 1 month ago. Patient was worked up by her gastroenterologist Dr. Maldonado who ordered an abdominal ultrasound. Abd U/S at that time showed a pancreatic mass. Subsequent CT abd/pelvis showed a pancreatic tail mass with splenic encasement. Patient was scheduled for a EGD with biopsy with Dr. Campbell however patient was in acute distress and presented to ED on 10/10. Pain is located in the LUQ which occasional radiation to the back. Pain is associated with epigastric discomfort. Patient unable to tolerate food but has been attempting to drink smoothies. Reports nausea, fatigue, dry heaving, prandial intolerance, minimal bowel movements and constipation. Denies fevers, chills, cough, SOB, lower extremity swelling. Of note, patient had been on Trulicity for 2 years and on Qsyima from April to July for weight loss with only minor fluctuate in weight.     Imaging:   - CXR: no consolidation, effusion, or pneumothorax (personally reviewed).   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 Differential diagnosis would include solid pseudopapillary epithelial neoplasm and mucinous tumor.     10/12: 45 year old female with a PMHx of hypothyroidism and depression presented to the ED for abdominal pain which began 1 month ago. Patient was worked up by her gastroenterologist Dr. Maldonado who ordered an abdominal ultrasound. Abd U/S at that time showed a pancreatic mass. Subsequent CT abd/pelvis showed a pancreatic tail mass with splenic encasement. Patient was scheduled for a EGD with biopsy with Dr. Campbell however patient was in acute distress and presented to ED on 10/10. Pain is located in the LUQ which occasional radiation to the back. Pain is associated with epigastric discomfort. Patient unable to tolerate food but has been attempting to drink smoothies. Reports nausea, fatigue, dry heaving, prandial intolerance, minimal bowel movements and constipation. Denies fevers, chills, cough, SOB, lower extremity swelling. Of note, patient had been on Trulicity for 2 years and on Qsyima from April to July for weight loss with only minor fluctuate in weight.     Imaging:   - CXR: no consolidation, effusion, or pneumothorax (personally reviewed).   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 Differential diagnosis would include solid pseudopapillary epithelial neoplasm and mucinous tumor.     10/12:

## 2021-10-12 NOTE — PROGRESS NOTE ADULT - SUBJECTIVE AND OBJECTIVE BOX
Patient is a 45y old  Female who presents with a chief complaint of Abdominal pain (12 Oct 2021 12:03)    Pt still with significant pain.   Saw med onc, surg onc.         MEDICATIONS  (STANDING):  escitalopram 10 milliGRAM(s) Oral daily  levothyroxine 137 MICROGram(s) Oral daily  pantoprazole  Injectable 40 milliGRAM(s) IV Push daily  sodium chloride 0.9%. 1000 milliLiter(s) (75 mL/Hr) IV Continuous <Continuous>  sucralfate 1 Gram(s) Oral two times a day    MEDICATIONS  (PRN):  acetaminophen   Tablet .. 650 milliGRAM(s) Oral every 6 hours PRN Temp greater or equal to 38C (100.4F), Mild Pain (1 - 3)  aluminum hydroxide/magnesium hydroxide/simethicone Suspension 30 milliLiter(s) Oral every 4 hours PRN Dyspepsia  HYDROmorphone  Injectable 0.5 milliGRAM(s) IV Push every 3 hours PRN Moderate Pain (4 - 6)  loratadine 10 milliGRAM(s) Oral daily PRN allergies  melatonin 3 milliGRAM(s) Oral at bedtime PRN Insomnia  morphine  - Injectable 2 milliGRAM(s) IV Push every 2 hours PRN Severe Pain (7 - 10)  ondansetron Injectable 4 milliGRAM(s) IV Push every 8 hours PRN Nausea and/or Vomiting  polyethylene glycol 3350 17 Gram(s) Oral daily PRN Constipation      Vital Signs Last 24 Hrs  T(C): 36.8 (12 Oct 2021 07:40), Max: 36.9 (12 Oct 2021 00:06)  T(F): 98.3 (12 Oct 2021 07:40), Max: 98.5 (12 Oct 2021 00:06)  HR: 88 (12 Oct 2021 07:40) (68 - 88)  BP: 100/52 (12 Oct 2021 07:40) (100/52 - 115/70)  BP(mean): --  RR: 18 (12 Oct 2021 07:40) (18 - 20)  SpO2: 100% (12 Oct 2021 07:40) (90% - 100%)    PHYSICAL EXAM:  Gen: pleasant, uncomfortable due to pain  Eyes: not icteric  Skin: not jaundiced  Abd: soft, tender    LABS:                        13.5   8.35  )-----------( 249      ( 12 Oct 2021 07:36 )             41.8     10-12    137  |  104  |  9   ----------------------------<  85  3.6   |  24  |  0.70    Ca    9.0      12 Oct 2021 07:36    TPro  7.7  /  Alb  3.7  /  TBili  0.6  /  DBili  x   /  AST  22  /  ALT  32  /  AlkPhos  77  10-10    PT/INR - ( 10 Oct 2021 23:08 )   PT: 12.7 sec;   INR: 1.10 ratio         PTT - ( 10 Oct 2021 23:08 )  PTT:39.9 sec  LIVER FUNCTIONS - ( 10 Oct 2021 23:08 )  Alb: 3.7 g/dL / Pro: 7.7 gm/dL / ALK PHOS: 77 U/L / ALT: 32 U/L / AST: 22 U/L / GGT: x             RADIOLOGY & ADDITIONAL STUDIES: reviewed CT chest, MRI

## 2021-10-12 NOTE — PROGRESS NOTE ADULT - SUBJECTIVE AND OBJECTIVE BOX
HPI:  44 y/o F with PMH hypothyroidism presents for abdominal pain. Patient began having abdominal pain 1 month ago. She went to her PCP initially and workup was negative, so she followed up with her gastroenterologist Dr. Maldonado who ordered an abdominal ultrasound. It showed a mass in the pancreas. She went for subsequent CT abd/pelvis which showed a pancreatic tail mass encasing the spleen. She was going to set up an EGD with biopsy with Dr. Campbell, but her pain acutely worsened on the day of admission. Pain located in the LUQ which is excruciating and extends to her back at times, she also has a discomfort in the epigastric region. Unable to tolerate food, has been drinking smoothies. Reports nausea,  fatigue, dry heaving, unbearable pain with eating food, minimal bowel movements, and constipation. Denies fevers, chills, cough, SOB, lower extremity swelling. She had been on Trulicity for 2 years and Qsyima from April to July for weight loss. She reports weight only fluctuating a few lbs.     ER course: VSS. Labs: CBC unremarkable, eosinophils 6.1%. CMP: Glucose 119. EKG: NSR with HR 62 bpm, normal intervals, no ST segment changes (personally reviewed).     Imaging:   - CXR: no consolidation, effusion, or pneumothorax (personally reviewed).   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 Differential diagnosis would include solid pseudopapillary epithelial neoplasm and mucinous tumor. Recommend further evaluation with MRI. Scattered pulmonary opacities which may represent multifocal infection.     10/12/21 S/p EUS FNA of pancreatic mass. Pain is ongoing issue. Morphine helps. No n/v  + constipation     PAST MEDICAL & SURGICAL HISTORY:  Hypothyroid    No pertinent past surgical history      FAMILY HISTORY:  FH: thyroid cancer (Mother)    Family history of cancer of tongue (Mother)    FH: asthma (Father)    FH: lupus (Mother)      Vital Signs Last 24 Hrs  T(C): 36.8 (12 Oct 2021 07:40), Max: 36.9 (12 Oct 2021 00:06)  T(F): 98.3 (12 Oct 2021 07:40), Max: 98.5 (12 Oct 2021 00:06)  HR: 88 (12 Oct 2021 07:40) (61 - 88)  BP: 100/52 (12 Oct 2021 07:40) (86/61 - 115/70)  BP(mean): --  RR: 18 (12 Oct 2021 07:40) (13 - 20)  SpO2: 100% (12 Oct 2021 07:40) (90% - 100%)    Pleasant female NAD. HEENT  normal  Neck no masses  No NIDIA  Lungs clear  Heart RRR  Abd slightly  tender epigastric LUQ BS +  No pedal edema  Neuro non focal  Psych normal affect                           13.8   8.16  )-----------( 299      ( 10 Oct 2021 23:08 )             42.2   10-10    136  |  103  |  11  ----------------------------<  119<H>  3.5   |  27  |  0.69    Ca    9.1      10 Oct 2021 23:08    TPro  7.7  /  Alb  3.7  /  TBili  0.6  /  DBili  x   /  AST  22  /  ALT  32  /  AlkPhos  77  10-10      C1 19-9   221    MR MRCP reviewed - tumor in pancreatic tail encasing splenic artery, possible retroperitoneal extension; no liver mets    CT chest- no  metastatic disease

## 2021-10-12 NOTE — PROGRESS NOTE ADULT - ASSESSMENT
45 year old female with PMHx of hypothyroidism who presented for abdominal pain found to have a pancreatic tail mass  1. 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   - CT chest performed - no evidence of metastatic disease of lung or other pathology.   - MRI/MRCP performed -  Distal pancreatic body/tail mass observed. Celiac artery contact less than 180 degrees. Left adrenal gland contact is suggested. Perineural spread into the left celiac ganglion is suggested. There is no evidence of hepatic metastatic disease.  - Consult with Dr. Mendez Hem onc recs appreciated: Dr. Pascal will discuss case at multidisciplinary GI oncology tumor board on 10/14. Likely neoadjuvant chemotherapy (folfirinox) followed by surgery. Will discuss needs to preop radiation Genetic testing to be done outpatient  - Pain management via celiac plexus block tomorrow  - outpatient follow up with oncology and surgical oncology 10/14  - Surgical oncology already aware of patient.  - CA 19-9: 221; not indic  - GI consult - Dr. Campbell; pending GI recs.     2. Splenic artery thrombosis   - No anticoagulation as per Dr. Howell surg onc  - Likely due to tumor compression/extension  - Recs as above     3. Scattered pulmonary opacities   - Patient is afebrile, no WBC - will not treat with abx as this time as no evidence of infectious process   - No metastatic disease evident: CT Chest negative for lung metastasis. EGD showed no hepatic metastasis.   - Heme/Onc evaluation - Dr. Mendez, recs listed above    4. History of hypothyroidism   - c/w home medications     DVT ppx: SCDs   Code status: Full code (pt agrees to chest compressions and intubation if required)  45 year old female with PMHx of hypothyroidism who presented for abdominal pain found to have a pancreatic tail mass  1. Pancreatic tail mass with splenic artery encasement   - Admit to med/surg   - Pain control: tylenol, Dilaudid 0.5mg IV q3 PRN   - Zofran for nausea, pantoprazole and carafate for acid reflux  - IVF at 100 ml/hr   - EGD w/ biopsies on 10/11 demonstrated: 4.5 cm mass in tail of pancreas, s/p FNB with 22 gauge needle.13 mm splenic lymph node adjacent to mass.   - CT chest performed - no evidence of metastatic disease of lung or other pathology.   - MRI/MRCP performed -  Distal pancreatic body/tail mass observed. Celiac artery contact less than 180 degrees. Left adrenal gland contact is suggested. Perineural spread into the left celiac ganglion is suggested. There is no evidence of hepatic metastatic disease.  - Consult with Dr. Mendez Hem onc recs appreciated: Dr. Pascal will discuss case at multidisciplinary GI oncology tumor board on 10/14. Likely neoadjuvant chemotherapy (folfirinox) followed by surgery. Will discuss needs to preop radiation Genetic testing to be done outpatient  - Pain management via celiac plexus block tomorrow  - Patient to go for port placement tomorrow 10/13 for chemotherapy port placement. NPO after midnight, order placed.  - outpatient follow up with oncology and surgical oncology 10/14  - Surgical oncology already aware of patient.  - CA 19-9: 221; not indic  - GI consult - Dr. Campbell; pending GI recs.     2. Splenic artery thrombosis   - No anticoagulation as per Dr. Howell surg onc  - Likely due to tumor compression/extension  - Recs as above     3. Scattered pulmonary opacities   - Patient is afebrile, no WBC - will not treat with abx as this time as no evidence of infectious process   - No metastatic disease evident: CT Chest negative for lung metastasis. EGD showed no hepatic metastasis.   - Heme/Onc evaluation - Dr. Mendez, recs listed above    4. History of hypothyroidism   - c/w home medications     DVT ppx: SCDs   Code status: Full code (pt agrees to chest compressions and intubation if required)  45 year old female with PMHx of hypothyroidism who presented for abdominal pain found to have a pancreatic tail mass  1. Pancreatic tail mass with splenic artery encasement   - Admit to med/surg   - Pain control: tylenol, Dilaudid 0.5mg IV q3 PRN   - Zofran for nausea, pantoprazole and carafate for acid reflux  - IVF at 100 ml/hr   - EGD w/ biopsies on 10/11 demonstrated: 4.5 cm mass in tail of pancreas, s/p FNB with 22 gauge needle.13 mm splenic lymph node adjacent to mass.   - CT chest performed - no evidence of metastatic disease of lung or other pathology.   - MRI/MRCP performed -  Distal pancreatic body/tail mass observed. Celiac artery contact less than 180 degrees. Left adrenal gland contact is suggested. Perineural spread into the left celiac ganglion is suggested. There is no evidence of hepatic metastatic disease.  - Consult with Dr. Mendez Hem onc recs appreciated: Dr. Pascal will discuss case at multidisciplinary GI oncology tumor board on 10/14. Likely neoadjuvant chemotherapy (folfirinox) followed by surgery. Will discuss needs to preop radiation Genetic testing to be done outpatient  - Pain management via celiac plexus block tomorrow  - Patient to go for port placement tomorrow 10/13 for chemotherapy port placement. NPO after midnight, order placed.  - outpatient follow up with oncology and surgical oncology 10/14  - Surgical oncology already aware of patient.  - CA 19-9: 221; not indic  - GI consult - Dr. Campbell; pending GI recs.     2. Splenic artery thrombosis   - No anticoagulation as per Dr. Howell surg onc  - Likely due to tumor compression/extension  - Recs as above     3. Scattered pulmonary opacities   - Patient is afebrile, no WBC - will not treat with abx as this time as no evidence of infectious process   - No metastatic disease evident: CT Chest negative for lung metastasis. EGD showed no hepatic metastasis.   - Heme/Onc evaluation - Dr. Mendez, recs listed above    4. History of hypothyroidism   - c/w home medications     5. History of depression  - c/w home Lexapro 10mg QD  DVT ppx: SCDs   Code status: Full code (pt agrees to chest compressions and intubation if required)

## 2021-10-13 LAB
ANION GAP SERPL CALC-SCNC: 5 MMOL/L — SIGNIFICANT CHANGE UP (ref 5–17)
BUN SERPL-MCNC: 7 MG/DL — SIGNIFICANT CHANGE UP (ref 7–23)
CALCIUM SERPL-MCNC: 8.6 MG/DL — SIGNIFICANT CHANGE UP (ref 8.5–10.1)
CHLORIDE SERPL-SCNC: 107 MMOL/L — SIGNIFICANT CHANGE UP (ref 96–108)
CO2 SERPL-SCNC: 28 MMOL/L — SIGNIFICANT CHANGE UP (ref 22–31)
CREAT SERPL-MCNC: 0.6 MG/DL — SIGNIFICANT CHANGE UP (ref 0.5–1.3)
GLUCOSE SERPL-MCNC: 93 MG/DL — SIGNIFICANT CHANGE UP (ref 70–99)
HCG UR QL: NEGATIVE — SIGNIFICANT CHANGE UP
HCT VFR BLD CALC: 39.6 % — SIGNIFICANT CHANGE UP (ref 34.5–45)
HGB BLD-MCNC: 12.6 G/DL — SIGNIFICANT CHANGE UP (ref 11.5–15.5)
MCHC RBC-ENTMCNC: 29 PG — SIGNIFICANT CHANGE UP (ref 27–34)
MCHC RBC-ENTMCNC: 31.8 GM/DL — LOW (ref 32–36)
MCV RBC AUTO: 91 FL — SIGNIFICANT CHANGE UP (ref 80–100)
PLATELET # BLD AUTO: 239 K/UL — SIGNIFICANT CHANGE UP (ref 150–400)
POTASSIUM SERPL-MCNC: 3.7 MMOL/L — SIGNIFICANT CHANGE UP (ref 3.5–5.3)
POTASSIUM SERPL-SCNC: 3.7 MMOL/L — SIGNIFICANT CHANGE UP (ref 3.5–5.3)
RBC # BLD: 4.35 M/UL — SIGNIFICANT CHANGE UP (ref 3.8–5.2)
RBC # FLD: 12.3 % — SIGNIFICANT CHANGE UP (ref 10.3–14.5)
SODIUM SERPL-SCNC: 140 MMOL/L — SIGNIFICANT CHANGE UP (ref 135–145)
SURGICAL PATHOLOGY STUDY: SIGNIFICANT CHANGE UP
WBC # BLD: 8.93 K/UL — SIGNIFICANT CHANGE UP (ref 3.8–10.5)
WBC # FLD AUTO: 8.93 K/UL — SIGNIFICANT CHANGE UP (ref 3.8–10.5)

## 2021-10-13 PROCEDURE — 99233 SBSQ HOSP IP/OBS HIGH 50: CPT | Mod: GC

## 2021-10-13 PROCEDURE — 43259 EGD US EXAM DUODENUM/JEJUNUM: CPT

## 2021-10-13 PROCEDURE — 99232 SBSQ HOSP IP/OBS MODERATE 35: CPT

## 2021-10-13 RX ORDER — DIPHENHYDRAMINE HYDROCHLORIDE AND LIDOCAINE HYDROCHLORIDE AND ALUMINUM HYDROXIDE AND MAGNESIUM HYDRO
5 KIT THREE TIMES A DAY
Refills: 0 | Status: DISCONTINUED | OUTPATIENT
Start: 2021-10-13 | End: 2021-10-15

## 2021-10-13 RX ORDER — TRIAMCINOLONE 4 MG
40 TABLET ORAL ONCE
Refills: 0 | Status: DISCONTINUED | OUTPATIENT
Start: 2021-10-13 | End: 2021-10-15

## 2021-10-13 RX ORDER — SODIUM CHLORIDE 9 MG/ML
1000 INJECTION, SOLUTION INTRAVENOUS ONCE
Refills: 0 | Status: COMPLETED | OUTPATIENT
Start: 2021-10-13 | End: 2021-10-13

## 2021-10-13 RX ORDER — HYDROMORPHONE HYDROCHLORIDE 2 MG/ML
0.5 INJECTION INTRAMUSCULAR; INTRAVENOUS; SUBCUTANEOUS
Refills: 0 | Status: DISCONTINUED | OUTPATIENT
Start: 2021-10-13 | End: 2021-10-13

## 2021-10-13 RX ORDER — ONDANSETRON 8 MG/1
4 TABLET, FILM COATED ORAL ONCE
Refills: 0 | Status: DISCONTINUED | OUTPATIENT
Start: 2021-10-13 | End: 2021-10-13

## 2021-10-13 RX ORDER — BUPIVACAINE HCL/PF 7.5 MG/ML
10 VIAL (ML) INJECTION ONCE
Refills: 0 | Status: DISCONTINUED | OUTPATIENT
Start: 2021-10-13 | End: 2021-10-15

## 2021-10-13 RX ORDER — SODIUM CHLORIDE 9 MG/ML
1000 INJECTION, SOLUTION INTRAVENOUS
Refills: 0 | Status: DISCONTINUED | OUTPATIENT
Start: 2021-10-13 | End: 2021-10-13

## 2021-10-13 RX ADMIN — HYDROMORPHONE HYDROCHLORIDE 0.5 MILLIGRAM(S): 2 INJECTION INTRAMUSCULAR; INTRAVENOUS; SUBCUTANEOUS at 13:25

## 2021-10-13 RX ADMIN — SODIUM CHLORIDE 1000 MILLILITER(S): 9 INJECTION, SOLUTION INTRAVENOUS at 17:00

## 2021-10-13 RX ADMIN — HYDROMORPHONE HYDROCHLORIDE 0.5 MILLIGRAM(S): 2 INJECTION INTRAMUSCULAR; INTRAVENOUS; SUBCUTANEOUS at 12:15

## 2021-10-13 RX ADMIN — ONDANSETRON 4 MILLIGRAM(S): 8 TABLET, FILM COATED ORAL at 21:22

## 2021-10-13 RX ADMIN — HYDROMORPHONE HYDROCHLORIDE 0.5 MILLIGRAM(S): 2 INJECTION INTRAMUSCULAR; INTRAVENOUS; SUBCUTANEOUS at 09:12

## 2021-10-13 RX ADMIN — HYDROMORPHONE HYDROCHLORIDE 0.5 MILLIGRAM(S): 2 INJECTION INTRAMUSCULAR; INTRAVENOUS; SUBCUTANEOUS at 21:22

## 2021-10-13 RX ADMIN — HYDROMORPHONE HYDROCHLORIDE 0.5 MILLIGRAM(S): 2 INJECTION INTRAMUSCULAR; INTRAVENOUS; SUBCUTANEOUS at 16:40

## 2021-10-13 RX ADMIN — ESCITALOPRAM OXALATE 10 MILLIGRAM(S): 10 TABLET, FILM COATED ORAL at 09:11

## 2021-10-13 RX ADMIN — PANTOPRAZOLE SODIUM 40 MILLIGRAM(S): 20 TABLET, DELAYED RELEASE ORAL at 09:11

## 2021-10-13 RX ADMIN — HYDROMORPHONE HYDROCHLORIDE 0.5 MILLIGRAM(S): 2 INJECTION INTRAMUSCULAR; INTRAVENOUS; SUBCUTANEOUS at 22:38

## 2021-10-13 RX ADMIN — Medication 1 GRAM(S): at 09:11

## 2021-10-13 RX ADMIN — HYDROMORPHONE HYDROCHLORIDE 0.5 MILLIGRAM(S): 2 INJECTION INTRAMUSCULAR; INTRAVENOUS; SUBCUTANEOUS at 05:09

## 2021-10-13 RX ADMIN — Medication 3 MILLIGRAM(S): at 21:32

## 2021-10-13 RX ADMIN — HYDROMORPHONE HYDROCHLORIDE 0.5 MILLIGRAM(S): 2 INJECTION INTRAMUSCULAR; INTRAVENOUS; SUBCUTANEOUS at 10:11

## 2021-10-13 RX ADMIN — HYDROMORPHONE HYDROCHLORIDE 0.5 MILLIGRAM(S): 2 INJECTION INTRAMUSCULAR; INTRAVENOUS; SUBCUTANEOUS at 06:14

## 2021-10-13 RX ADMIN — Medication 1 GRAM(S): at 21:23

## 2021-10-13 RX ADMIN — ONDANSETRON 4 MILLIGRAM(S): 8 TABLET, FILM COATED ORAL at 05:09

## 2021-10-13 RX ADMIN — DIPHENHYDRAMINE HYDROCHLORIDE AND LIDOCAINE HYDROCHLORIDE AND ALUMINUM HYDROXIDE AND MAGNESIUM HYDRO 5 MILLILITER(S): KIT at 09:11

## 2021-10-13 RX ADMIN — Medication 137 MICROGRAM(S): at 05:09

## 2021-10-13 NOTE — BRIEF OPERATIVE NOTE - COMMENTS
CT chest with IV contrast  MRI abdomen to r/o liver lesions  Please call oncology to evaluate and to advise on anticoagulation of thrombus  Surgical oncology aware, will see after these studies performed
Pain consult for home regimen of pain meds.   Treatment with chemo, possible XRT will help.

## 2021-10-13 NOTE — PROGRESS NOTE ADULT - ASSESSMENT
45 year old female with PMHx of hypothyroidism who presented for abdominal pain found to have a pancreatic tail mass    1. 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   - CT chest performed - no evidence of metastatic disease of lung or other pathology.   - MRI/MRCP performed -  Distal pancreatic body/tail mass observed. Celiac artery contact less than 180 degrees. Left adrenal gland contact is suggested. Perineural spread into the left celiac ganglion is suggested. There is no evidence of hepatic metastatic disease.  - Consult with Dr. Mendez Hem onc recs appreciated: Dr. Pascal will discuss case at multidisciplinary GI oncology tumor board on 10/14. Likely neoadjuvant chemotherapy (folfirinox) followed by surgery. Will discuss needs to preop radiation. Genetic testing to be done outpatient  - Pain management via celiac plexus block was attempted today. Per Dr. Campbell Gastroenterology, the celiac plexus is encased by the pancreatic mass and there is no angle to conduct a nerve block without contacting the mass. This would likely cause spread of the cancer. Pain will be managed via medicine and will likely be controlled after chemotherapy begins.   - Patient to go for port placement tomorrow 10/14 for chemotherapy port placement. NPO after midnight, order placed.  - outpatient follow up with oncology and surgical oncology pending discharge.   - Surgical oncology already aware of patient.  - CA 19-9: 221;       2. Splenic artery thrombosis   - No anticoagulation as per Dr. Howell surg onc  - Likely due to tumor compression/extension  - Recs as above     3. Scattered pulmonary opacities   - Patient is afebrile, no WBC - will not treat with abx as this time as no evidence of infectious process   - No metastatic disease evident: CT Chest negative for lung metastasis. EGD showed no hepatic metastasis.   - Heme/Onc evaluation - Dr. Mendez, recs listed above    4. History of hypothyroidism   - c/w home medications     5. History of depression  - c/w home Lexapro 10mg QD    DVT ppx: SCDs   Code status: Full code (pt agrees to chest compressions and intubation if require

## 2021-10-13 NOTE — PROGRESS NOTE ADULT - SUBJECTIVE AND OBJECTIVE BOX
HPI:  46 y/o F with PMH hypothyroidism presents for abdominal pain. Patient began having abdominal pain 1 month ago. She went to her PCP initially and workup was negative, so she followed up with her gastroenterologist Dr. Maldonado who ordered an abdominal ultrasound. It showed a mass in the pancreas. She went for subsequent CT abd/pelvis which showed a pancreatic tail mass encasing the spleen. She was going to set up an EGD with biopsy with Dr. Campbell, but her pain acutely worsened on the day of admission. Pain located in the LUQ which is excruciating and extends to her back at times, she also has a discomfort in the epigastric region. Unable to tolerate food, has been drinking smoothies. Reports nausea,  fatigue, dry heaving, unbearable pain with eating food, minimal bowel movements, and constipation. Denies fevers, chills, cough, SOB, lower extremity swelling. She had been on Trulicity for 2 years and Qsyima from April to July for weight loss. She reports weight only fluctuating a few lbs.     ER course: VSS. Labs: CBC unremarkable, eosinophils 6.1%. CMP: Glucose 119. EKG: NSR with HR 62 bpm, normal intervals, no ST segment changes (personally reviewed).     Imaging:   - CXR: no consolidation, effusion, or pneumothorax (personally reviewed).   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 Differential diagnosis would include solid pseudopapillary epithelial neoplasm and mucinous tumor. Recommend further evaluation with MRI. Scattered pulmonary opacities which may represent multifocal infection.     10/12/21 S/p EUS FNA of pancreatic mass. Pain is ongoing issue. Morphine helps. No n/v  + constipation   10/13/21 Pain worse. Dilaudid works better than morphine. No BM. Plan for celiac block today.    PAST MEDICAL & SURGICAL HISTORY:  Hypothyroid    No pertinent past surgical history      FAMILY HISTORY:  FH: thyroid cancer (Mother)    Family history of cancer of tongue (Mother)    FH: asthma (Father)    FH: lupus (Mother)    Vital Signs Last 24 Hrs  T(C): 36.9 (13 Oct 2021 07:54), Max: 36.9 (13 Oct 2021 07:54)  T(F): 98.5 (13 Oct 2021 07:54), Max: 98.5 (13 Oct 2021 07:54)  HR: 63 (13 Oct 2021 07:54) (60 - 65)  BP: 111/69 (13 Oct 2021 07:54) (96/57 - 111/69)  BP(mean): --  RR: 17 (13 Oct 2021 07:54) (17 - 20)  SpO2: 96% (13 Oct 2021 07:54) (96% - 100%)    Pleasant female NAD. HEENT  normal  Neck no masses  No NIDIA  Lungs clear  Heart RRR  Abd slightly  tender epigastric LUQ BS +  No pedal edema  Neuro non focal  Psych normal affect                           13.8   8.16  )-----------( 299      ( 10 Oct 2021 23:08 )             42.2   10-10    136  |  103  |  11  ----------------------------<  119<H>  3.5   |  27  |  0.69    Ca    9.1      10 Oct 2021 23:08    TPro  7.7  /  Alb  3.7  /  TBili  0.6  /  DBili  x   /  AST  22  /  ALT  32  /  AlkPhos  77  10-10      C1 19-9   221    MR MRCP reviewed - tumor in pancreatic tail encasing splenic artery, possible retroperitoneal extension; no liver mets    CT chest- no  metastatic disease     Pathology from FNA of pancreatic mass- pending.

## 2021-10-13 NOTE — PROGRESS NOTE ADULT - ASSESSMENT
46 y/o female presented with  abdominal pain x one month. Imaging showed pacreatic tail mass. S/o EUS / FNA.  Based on MR imaging- likely borderline resectable pancreatic cancer.  She was evaluated by surgical oncology evaluation- Dr Howell.  Follow path results.  Will discuss patient's management plan at multidisciplinary GI oncology tumor board this Thursday. Likely  neoadjuvant chemotherapy ( Folfirinox), followed by surgery. Possible preoperative radiation in addition to chemo ( to be discussed).  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 . No need for anticoagulation- d/w Dr Howell  [facility] - scheduled for tomorrow.   Pain management.  Might benefit form long acting pain meds ( Duragesic)- in addition to breakthrough meds.  Celiac plexus block today.  Bowel regimen.  Follow up in outpatient with oncology and surgical oncology.

## 2021-10-13 NOTE — PROGRESS NOTE ADULT - SUBJECTIVE AND OBJECTIVE BOX
45 year old female with a PMHx of hypothyroidism and depression presented to the ED for abdominal pain which began 1 month ago. Patient was worked up by her gastroenterologist Dr. Maldonado who ordered an abdominal ultrasound. Abd U/S at that time showed a pancreatic mass. Subsequent CT abd/pelvis showed a pancreatic tail mass with splenic encasement. Patient was scheduled for a EGD with biopsy with Dr. Campbell however patient was in acute distress and presented to ED on 10/10. Pain is located in the LUQ which occasional radiation to the back. Pain is associated with epigastric discomfort. Patient unable to tolerate food but has been attempting to drink smoothies. Reports nausea, fatigue, dry heaving, prandial intolerance, minimal bowel movements and constipation. Denies fevers, chills, cough, SOB, lower extremity swelling. Of note, patient had been on Trulicity for 2 years and on Qsyima from April to July for weight loss with only minor fluctuate in weight.     10/13: Patient seen and examine at bedside. Patient is reportedly in good spirits and is optimistic about her prognosis. She is still complaining of abdominal pain that she states is only temporarily resolved with Dilaudid.        REVIEW OF SYSTEMS:  CONSTITUTIONAL: (-) weakness, (-) fevers, (-) chills  EYES/ENT: (-) visual changes,  (-) vertigo,  (-) throat pain   NECK:  (-) pain, (-) stiffness  RESPIRATORY:  (-) shortness of breath, (-) cough,  (-) wheezing,  (-) hemoptysis   CARDIOVASCULAR:  (-) chest pain, (-) palpitations  GASTROINTESTINAL:  (-) abdominal or epigastric pain, (-) nausea, (-) vomiting, (-) diarrhea, (-) constipation, (-) melena,  (-) hematemesis,  (-) hematochezia  GENITOURINARY: (-) dysuria, (-) frequency, (-) hematuria  NEUROLOGICAL: (-) numbness, (-) weakness  SKIN: (-) itching, (-) rashes, (-) lesions    PHYSICAL EXAM:  Vital Signs Last 24 Hrs  T(C): 36.3 (13 Oct 2021 17:15), Max: 37.1 (13 Oct 2021 15:31)  T(F): 97.3 (13 Oct 2021 17:15), Max: 98.7 (13 Oct 2021 15:31)  HR: 60 (13 Oct 2021 17:15) (59 - 67)  BP: 98/61 (13 Oct 2021 17:15) (93/61 - 111/69)  BP(mean): --  RR: 16 (13 Oct 2021 17:15) (16 - 20)  SpO2: 99% (13 Oct 2021 17:15) (94% - 100%)    Constitutional: Pt lying in bed, awake and alert, NAD  HEENT: EOMI, normal hearing, moist mucous membranes  Neck: Soft and supple, no JVD  Respiratory: CTABL, No wheezing, rales or rhonchi  Cardiovascular: S1S2+, RRR, no M/G/R  Gastrointestinal: BS+, soft, NT/ND, no guarding, no rebound  Extremities: No peripheral edema  Vascular: 2+ peripheral pulses  Neurological: AAOx3, no focal deficits  Musculoskeletal: 5/5 strength b/l upper and lower extremities  Skin: No rashes    MEDICATIONS:  MEDICATIONS  (STANDING):  BUpivacaine 0.25% (Preservative-Free) Injectable 10 milliLiter(s) Local Injection once  escitalopram 10 milliGRAM(s) Oral daily  levothyroxine 137 MICROGram(s) Oral daily  pantoprazole  Injectable 40 milliGRAM(s) IV Push daily  sodium chloride 0.9%. 1000 milliLiter(s) (75 mL/Hr) IV Continuous <Continuous>  sucralfate 1 Gram(s) Oral two times a day  triamcinolone    acetonide 40 mG/mL Injectable (KENALOG-40) 40 milliGRAM(s) IntraMuscular once      LABS: All Labs Reviewed:                        12.6   8.93  )-----------( 239      ( 13 Oct 2021 07:35 )             39.6     10-13    140  |  107  |  7   ----------------------------<  93  3.7   |  28  |  0.60    Ca    8.6      13 Oct 2021 07:35        Imaging:   - CXR: no consolidation, effusion, or pneumothorax, no evidence of metastatic disease.   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 Differential diagnosis would include solid pseudopapillary epithelial neoplasm and mucinous tumor.  - MR/MRCP: Distal pancreatic body/tail mass as detailed above. Celiac artery contact less than 180 degrees. Left adrenal gland contact is suggested. Perineural spread into the left celiac ganglion is suggested. No evidence of hepatic metastatic disease.

## 2021-10-13 NOTE — PROGRESS NOTE ADULT - ASSESSMENT
Pt has been seen and examined with FP resident, resident supervised agree with a/p       Patient is a 45y old  Female who presents with a chief complaint of Abdominal pain (11 Oct 2021 09:49)      HPI:  46 y/o F with PMH hypothyroidism presents for abdominal pain. Patient began having abdominal pain 1 month ago. She went to her PCP initially and workup was negative, so she followed up with her gastroenterologist Dr. Maldonado who ordered an abdominal ultrasound. It showed a mass in the pancreas. She went for subsequent CT abd/pelvis which showed a pancreatic tail mass encasing the spleen. She was going to set up an EGD with biopsy with Dr. Campbell, but her pain acutely worsened on the day of admission. Pain located in the LUQ which is excruciating and extends to her back at times, she also has a discomfort in the epigastric region. Unable to tolerate food, has been drinking smoothies. Reports nausea,  fatigue, dry heaving, unbearable pain with eating food, minimal bowel movements, and constipation. Denies fevers, chills, cough, SOB, lower extremity swelling. She had been on Trulicity for 2 years and Qsyima from April to July for weight loss. She reports weight only fluctuating a few lbs.             PHYSICAL EXAM:    -rs-aeeb, cta  -cvs-s1s2 normal   -p/a-soft,bs+      A/P    #Ct pain control, tomorrow mediport placement     #discharge plan

## 2021-10-14 DIAGNOSIS — Z87.19 PERSONAL HISTORY OF OTHER DISEASES OF THE DIGESTIVE SYSTEM: ICD-10-CM

## 2021-10-14 DIAGNOSIS — R93.3 ABNORMAL FINDINGS ON DIAGNOSTIC IMAGING OF OTHER PARTS OF DIGESTIVE TRACT: ICD-10-CM

## 2021-10-14 DIAGNOSIS — L03.119 CELLULITIS OF UNSPECIFIED PART OF LIMB: ICD-10-CM

## 2021-10-14 DIAGNOSIS — Z86.69 PERSONAL HISTORY OF OTHER DISEASES OF THE NERVOUS SYSTEM AND SENSE ORGANS: ICD-10-CM

## 2021-10-14 LAB — SARS-COV-2 RNA SPEC QL NAA+PROBE: SIGNIFICANT CHANGE UP

## 2021-10-14 PROCEDURE — 76937 US GUIDE VASCULAR ACCESS: CPT | Mod: 26

## 2021-10-14 PROCEDURE — 36561 INSERT TUNNELED CV CATH: CPT

## 2021-10-14 PROCEDURE — 99233 SBSQ HOSP IP/OBS HIGH 50: CPT | Mod: GC

## 2021-10-14 PROCEDURE — 99232 SBSQ HOSP IP/OBS MODERATE 35: CPT

## 2021-10-14 PROCEDURE — 77001 FLUOROGUIDE FOR VEIN DEVICE: CPT | Mod: 26

## 2021-10-14 RX ORDER — ONDANSETRON 8 MG/1
4 TABLET, FILM COATED ORAL ONCE
Refills: 0 | Status: COMPLETED | OUTPATIENT
Start: 2021-10-14 | End: 2021-10-14

## 2021-10-14 RX ORDER — LANOLIN ALCOHOL/MO/W.PET/CERES
5 CREAM (GRAM) TOPICAL ONCE
Refills: 0 | Status: COMPLETED | OUTPATIENT
Start: 2021-10-14 | End: 2021-10-14

## 2021-10-14 RX ORDER — NALOXONE HYDROCHLORIDE 4 MG/.1ML
0.2 SPRAY NASAL ONCE
Refills: 0 | Status: DISCONTINUED | OUTPATIENT
Start: 2021-10-14 | End: 2021-10-15

## 2021-10-14 RX ORDER — ONDANSETRON 8 MG/1
4 TABLET, FILM COATED ORAL ONCE
Refills: 0 | Status: DISCONTINUED | OUTPATIENT
Start: 2021-10-14 | End: 2021-10-14

## 2021-10-14 RX ORDER — ALPRAZOLAM 0.25 MG
0.25 TABLET ORAL ONCE
Refills: 0 | Status: DISCONTINUED | OUTPATIENT
Start: 2021-10-14 | End: 2021-10-14

## 2021-10-14 RX ORDER — HYDROMORPHONE HYDROCHLORIDE 2 MG/ML
1 INJECTION INTRAMUSCULAR; INTRAVENOUS; SUBCUTANEOUS ONCE
Refills: 0 | Status: DISCONTINUED | OUTPATIENT
Start: 2021-10-14 | End: 2021-10-14

## 2021-10-14 RX ORDER — FENTANYL CITRATE 50 UG/ML
1 INJECTION INTRAVENOUS
Refills: 0 | Status: DISCONTINUED | OUTPATIENT
Start: 2021-10-14 | End: 2021-10-15

## 2021-10-14 RX ORDER — HYDROMORPHONE HYDROCHLORIDE 2 MG/ML
1 INJECTION INTRAMUSCULAR; INTRAVENOUS; SUBCUTANEOUS
Refills: 0 | Status: DISCONTINUED | OUTPATIENT
Start: 2021-10-14 | End: 2021-10-15

## 2021-10-14 RX ORDER — HYDROMORPHONE HYDROCHLORIDE 2 MG/ML
2 INJECTION INTRAMUSCULAR; INTRAVENOUS; SUBCUTANEOUS
Refills: 0 | Status: DISCONTINUED | OUTPATIENT
Start: 2021-10-14 | End: 2021-10-15

## 2021-10-14 RX ORDER — FENTANYL CITRATE 50 UG/ML
50 INJECTION INTRAVENOUS
Refills: 0 | Status: DISCONTINUED | OUTPATIENT
Start: 2021-10-14 | End: 2021-10-14

## 2021-10-14 RX ORDER — MORPHINE SULFATE 50 MG/1
15 CAPSULE, EXTENDED RELEASE ORAL EVERY 12 HOURS
Refills: 0 | Status: DISCONTINUED | OUTPATIENT
Start: 2021-10-14 | End: 2021-10-14

## 2021-10-14 RX ORDER — OXYCODONE HYDROCHLORIDE 5 MG/1
5 TABLET ORAL ONCE
Refills: 0 | Status: DISCONTINUED | OUTPATIENT
Start: 2021-10-14 | End: 2021-10-14

## 2021-10-14 RX ORDER — SODIUM CHLORIDE 9 MG/ML
1000 INJECTION, SOLUTION INTRAVENOUS
Refills: 0 | Status: DISCONTINUED | OUTPATIENT
Start: 2021-10-14 | End: 2021-10-14

## 2021-10-14 RX ADMIN — HYDROMORPHONE HYDROCHLORIDE 0.5 MILLIGRAM(S): 2 INJECTION INTRAMUSCULAR; INTRAVENOUS; SUBCUTANEOUS at 07:40

## 2021-10-14 RX ADMIN — HYDROMORPHONE HYDROCHLORIDE 0.5 MILLIGRAM(S): 2 INJECTION INTRAMUSCULAR; INTRAVENOUS; SUBCUTANEOUS at 03:40

## 2021-10-14 RX ADMIN — HYDROMORPHONE HYDROCHLORIDE 0.5 MILLIGRAM(S): 2 INJECTION INTRAMUSCULAR; INTRAVENOUS; SUBCUTANEOUS at 02:57

## 2021-10-14 RX ADMIN — SODIUM CHLORIDE 75 MILLILITER(S): 9 INJECTION INTRAMUSCULAR; INTRAVENOUS; SUBCUTANEOUS at 06:01

## 2021-10-14 RX ADMIN — ONDANSETRON 4 MILLIGRAM(S): 8 TABLET, FILM COATED ORAL at 06:42

## 2021-10-14 RX ADMIN — HYDROMORPHONE HYDROCHLORIDE 1 MILLIGRAM(S): 2 INJECTION INTRAMUSCULAR; INTRAVENOUS; SUBCUTANEOUS at 17:14

## 2021-10-14 RX ADMIN — Medication 137 MICROGRAM(S): at 06:42

## 2021-10-14 RX ADMIN — HYDROMORPHONE HYDROCHLORIDE 2 MILLIGRAM(S): 2 INJECTION INTRAMUSCULAR; INTRAVENOUS; SUBCUTANEOUS at 15:30

## 2021-10-14 RX ADMIN — Medication 5 MILLIGRAM(S): at 03:00

## 2021-10-14 RX ADMIN — POLYETHYLENE GLYCOL 3350 17 GRAM(S): 17 POWDER, FOR SOLUTION ORAL at 13:03

## 2021-10-14 RX ADMIN — HYDROMORPHONE HYDROCHLORIDE 2 MILLIGRAM(S): 2 INJECTION INTRAMUSCULAR; INTRAVENOUS; SUBCUTANEOUS at 14:36

## 2021-10-14 RX ADMIN — HYDROMORPHONE HYDROCHLORIDE 0.5 MILLIGRAM(S): 2 INJECTION INTRAMUSCULAR; INTRAVENOUS; SUBCUTANEOUS at 07:25

## 2021-10-14 RX ADMIN — ESCITALOPRAM OXALATE 10 MILLIGRAM(S): 10 TABLET, FILM COATED ORAL at 11:57

## 2021-10-14 RX ADMIN — HYDROMORPHONE HYDROCHLORIDE 2 MILLIGRAM(S): 2 INJECTION INTRAMUSCULAR; INTRAVENOUS; SUBCUTANEOUS at 20:49

## 2021-10-14 RX ADMIN — Medication 3 MILLIGRAM(S): at 21:19

## 2021-10-14 RX ADMIN — FENTANYL CITRATE 1 PATCH: 50 INJECTION INTRAVENOUS at 19:35

## 2021-10-14 RX ADMIN — PANTOPRAZOLE SODIUM 40 MILLIGRAM(S): 20 TABLET, DELAYED RELEASE ORAL at 11:55

## 2021-10-14 RX ADMIN — FENTANYL CITRATE 1 PATCH: 50 INJECTION INTRAVENOUS at 15:00

## 2021-10-14 RX ADMIN — HYDROMORPHONE HYDROCHLORIDE 1 MILLIGRAM(S): 2 INJECTION INTRAMUSCULAR; INTRAVENOUS; SUBCUTANEOUS at 17:29

## 2021-10-14 RX ADMIN — SODIUM CHLORIDE 75 MILLILITER(S): 9 INJECTION INTRAMUSCULAR; INTRAVENOUS; SUBCUTANEOUS at 17:15

## 2021-10-14 RX ADMIN — ONDANSETRON 4 MILLIGRAM(S): 8 TABLET, FILM COATED ORAL at 17:22

## 2021-10-14 RX ADMIN — Medication 0.25 MILLIGRAM(S): at 09:31

## 2021-10-14 RX ADMIN — HYDROMORPHONE HYDROCHLORIDE 2 MILLIGRAM(S): 2 INJECTION INTRAMUSCULAR; INTRAVENOUS; SUBCUTANEOUS at 19:49

## 2021-10-14 RX ADMIN — Medication 1 GRAM(S): at 21:19

## 2021-10-14 RX ADMIN — Medication 1 GRAM(S): at 11:55

## 2021-10-14 RX ADMIN — ONDANSETRON 4 MILLIGRAM(S): 8 TABLET, FILM COATED ORAL at 13:03

## 2021-10-14 RX ADMIN — HYDROMORPHONE HYDROCHLORIDE 1 MILLIGRAM(S): 2 INJECTION INTRAMUSCULAR; INTRAVENOUS; SUBCUTANEOUS at 11:56

## 2021-10-14 NOTE — BRIEF OPERATIVE NOTE - OPERATION/FINDINGS
8F SL RIJV port, tip in SVC/RA
EUS FNA of 4.5 cm tail of pancreas mass. Prelim positive for adeno.
EUS with attempt at celiac plexus block. The pancreatic mass overlies the celiac axis and there is not an angle where the needle wouldn't go through the mass to get to the plexus. This is the number one way to spread disease, therefore block not performed.

## 2021-10-14 NOTE — PROGRESS NOTE ADULT - SUBJECTIVE AND OBJECTIVE BOX
45 year old female with a PMHx of hypothyroidism and depression presented to the ED for abdominal pain which began 1 month ago. Patient was worked up by her gastroenterologist Dr. Maldonado who ordered an abdominal ultrasound. Abd U/S at that time showed a pancreatic mass. Subsequent CT abd/pelvis showed a pancreatic tail mass with splenic encasement. Patient was scheduled for a EGD with biopsy with Dr. Campbell however patient was in acute distress and presented to ED on 10/10. Pain is located in the LUQ which occasional radiation to the back. Pain is associated with epigastric discomfort. Patient unable to tolerate food but has been attempting to drink smoothies. Reports nausea, fatigue, dry heaving, prandial intolerance, minimal bowel movements and constipation. Denies fevers, chills, cough, SOB, lower extremity swelling. Of note, patient had been on Trulicity for 2 years and on Qsyima from April to July for weight loss with only minor fluctuate in weight.     10/13: Patient seen and examine at bedside. Patient is reportedly in good spirits and is optimistic about her prognosis. She is still complaining of abdominal pain that she states is only temporarily resolved with Dilaudid.  Constitutional: Pt lying in bed, awake and alert, NAD  10/14: Patient seen and examined at bedside. Patient is complaining of severe abdominal pain and states she in unable to tolerate PO intake. Patient understands the pain is due to pancreatic mass and is eager to begin chemotherapy.     Neck: Soft and supple, no JVD  Respiratory: CTABL, No wheezing, rales or rhonchi  Cardiovascular: S1S2+, RRR, no M/G/R  Gastrointestinal: Abdominal pain  Extremities: No peripheral edema  Vascular: 2+ peripheral pulses  Neurological: AAOx3, no focal deficits  Musculoskeletal: 5/5 strength b/l upper and lower extremities  Skin: No rashes      ICU Vital Signs Last 24 Hrs  T(C): 36.3 (14 Oct 2021 11:45), Max: 37.1 (13 Oct 2021 15:31)  T(F): 97.4 (14 Oct 2021 11:45), Max: 98.7 (13 Oct 2021 15:31)  HR: 72 (14 Oct 2021 11:45) (52 - 79)  BP: 100/55 (14 Oct 2021 11:45) (92/57 - 111/71)  RR: 18 (14 Oct 2021 11:45) (16 - 20)  SpO2: 100% (14 Oct 2021 11:45) (94% - 100%)      Imaging:   - CXR: no consolidation, effusion, or pneumothorax, no evidence of metastatic disease.   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 Differential diagnosis would include solid pseudopapillary epithelial neoplasm and mucinous tumor.  - MR/MRCP: Distal pancreatic body/tail mass as detailed above. Celiac artery contact less than 180 degrees. Left adrenal gland contact is suggested. Perineural spread into the left celiac ganglion is suggested. No evidence of hepatic metastatic disease.

## 2021-10-14 NOTE — PROGRESS NOTE ADULT - ATTENDING COMMENTS
-rs-aeeb, cta  -p/a-soft, bs+  -cvs-s1s2 normal     A/P    #pain control, discharge plan -rs-aeeb, cta  -p/a-soft, bs+  -cvs-s1s2 normal     A/P    #pain control, discharge plan    #start long acting narcotics and monitor pain

## 2021-10-14 NOTE — PROGRESS NOTE ADULT - ASSESSMENT
45 year old female with PMHx of hypothyroidism who presented for abdominal pain found to have a pancreatic tail mass    1. 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   - CT chest performed - no evidence of metastatic disease of lung or other pathology.   - MRI/MRCP performed -  Distal pancreatic body/tail mass observed. Celiac artery contact less than 180 degrees. Left adrenal gland contact is suggested. Perineural spread into the left celiac ganglion is suggested. There is no evidence of hepatic metastatic disease.  - Consult with Dr. Mendez Hem onc recs appreciated: Patient was presented at multidisciplinary GI oncology tumor board. Patient will begin neoadjuvant chemotherapy with Folfirinox and will be evaluated after 4 cycles with possible subsequent chemotherapy. Surigcal resection is planned after chemotx cycle is complete. Patient to see Dr. Howell next week to discuss this resection.   - Pain management via celiac plexus block was attempted 10/13. Per Dr. Campbell Gastroenterology, the celiac plexus is encased by the pancreatic mass and there is no angle to conduct a nerve block without contacting the mass. This would likely cause spread of the cancer. Pain will be managed via medicine and will likely be controlled after chemotherapy begins.   - Pain management with MS Contin 15mg Q 12 hrs - will continue outpatient   - Patient to begin chemotherapy Tuesday 10/19, port placed 10/14 with IR      2. Splenic artery thrombosis   - No anticoagulation as per Dr. Howell surg onc  - Likely due to tumor compression/extension  - Recs as above     3. Scattered pulmonary opacities   - Patient is afebrile, no WBC - will not treat with abx as this time as no evidence of infectious process   - No metastatic disease evident: CT Chest negative for lung metastasis. EGD showed no hepatic metastasis.   - Heme/Onc evaluation - Dr. Mendez, recs listed above    4. History of hypothyroidism   - c/w home medications     5. History of depression  - c/w home Lexapro 10mg QD    DVT ppx: SCDs   Code status: Full code (pt agrees to chest compressions and intubation if require    Dispo: Patient likely to be D/C tomorrow 10/15 following observation for pain control     D/w Dr. Patrick

## 2021-10-14 NOTE — PROGRESS NOTE ADULT - ASSESSMENT
46 y/o female presented with  abdominal pain x one month. Imaging showed pacreatic tail mass. S/o EUS / FNA.  Based on MR imaging-  borderline resectable pancreatic cancer.  She was evaluated by surgical oncology evaluation- Dr Howell.  Pathology confirmed poorly differentiated carcinoma  Patient was presented / management discussed on multidisciplinary at multidisciplinary GI oncology tumor board today morning.    Recommendations:  neoadjuvant chemotherapy- Folfirinox  . Evaluate response after 4 cycles ( total up to 12 cycles ). Might benefit from subsequent chemoRT in response suboptimal.  Definitive  surgical resection after neoadjuvant therapy completed. Has appointment with surgical oncology Dr Howell next week to discuss.  Genetic testing ( will be done in our office)- scheduled in outpatient for MOnday 10/18  Will start   chemotherapy on Tuesday 10/19.  Supportive care- pain management. Consider long acting pain meds ( Duragesic)- in addition to breakthrough meds.  Splenic vein tumor thrombus  due to direct tumor  extension ( reviewed with radiology). No indication for anticoagulation.  44 y/o female presented with  abdominal pain x one month. Imaging showed pacreatic tail mass. S/o EUS / FNA.  Based on MR imaging-  borderline resectable pancreatic cancer.  She was evaluated by surgical oncology evaluation- Dr Howell.  Pathology confirmed poorly differentiated carcinoma  Patient was presented / management discussed on multidisciplinary at multidisciplinary GI oncology tumor board today morning.    Recommendations:  neoadjuvant chemotherapy- Folfirinox  . Evaluate response after 4 cycles ( total up to 12 cycles ). Might benefit from subsequent chemoRT in response suboptimal.  Definitive  surgical resection after neoadjuvant therapy completed. Has appointment with surgical oncology Dr Howell next week to discuss.  Genetic testing ( will be done in our office)- scheduled in outpatient for MOnday 10/18  Will start   chemotherapy on Tuesday 10/19.  Supportive care- pain management.   Started on Fentanyl patch. Change iv dilaudid to oral- she will continue Dilaudid for breakthrough pain at home. Sent Rx to her pharmacy for Fentanyl patch, oral Dilaudid  and zofran.  Splenic vein tumor thrombus  due to direct tumor  extension ( reviewed with radiology). No indication for anticoagulation.

## 2021-10-14 NOTE — PROGRESS NOTE ADULT - PROVIDER SPECIALTY LIST ADULT
Family Medicine
Hospitalist
Family Medicine
Hospitalist
Family Medicine
Gastroenterology
Heme/Onc
Heme/Onc
Family Medicine
Heme/Onc

## 2021-10-14 NOTE — PROGRESS NOTE ADULT - SUBJECTIVE AND OBJECTIVE BOX
HPI:  44 y/o F with PMH hypothyroidism presents for abdominal pain. Patient began having abdominal pain 1 month ago. She went to her PCP initially and workup was negative, so she followed up with her gastroenterologist Dr. Maldonado who ordered an abdominal ultrasound. It showed a mass in the pancreas. She went for subsequent CT abd/pelvis which showed a pancreatic tail mass encasing the spleen. She was going to set up an EGD with biopsy with Dr. Campbell, but her pain acutely worsened on the day of admission. Pain located in the LUQ which is excruciating and extends to her back at times, she also has a discomfort in the epigastric region. Unable to tolerate food, has been drinking smoothies. Reports nausea,  fatigue, dry heaving, unbearable pain with eating food, minimal bowel movements, and constipation. Denies fevers, chills, cough, SOB, lower extremity swelling. She had been on Trulicity for 2 years and Qsyima from April to July for weight loss. She reports weight only fluctuating a few lbs.     ER course: VSS. Labs: CBC unremarkable, eosinophils 6.1%. CMP: Glucose 119. EKG: NSR with HR 62 bpm, normal intervals, no ST segment changes (personally reviewed).     Imaging:   - CXR: no consolidation, effusion, or pneumothorax (personally reviewed).   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 Differential diagnosis would include solid pseudopapillary epithelial neoplasm and mucinous tumor. Recommend further evaluation with MRI. Scattered pulmonary opacities which may represent multifocal infection.     10/12/21 S/p EUS FNA of pancreatic mass. Pain is ongoing issue. Morphine helps. No n/v  + constipation   10/13/21 Pain worse. Dilaudid works better than morphine. No BM. Plan for celiac block today.  10/14/21  S/p attempted celiac plexus block- not successful- no window due to tumor overlying plexus  Today morning patient is off the floor to OR for mediport placement   PAST MEDICAL & SURGICAL HISTORY:  Hypothyroid    No pertinent past surgical history      FAMILY HISTORY:  FH: thyroid cancer (Mother)    Family history of cancer of tongue (Mother)    FH: asthma (Father)    FH: lupus (Mother)    Vital Signs Last 24 Hrs  T(C): 36.3 (14 Oct 2021 11:45), Max: 37.1 (13 Oct 2021 15:31)  T(F): 97.4 (14 Oct 2021 11:45), Max: 98.7 (13 Oct 2021 15:31)  HR: 72 (14 Oct 2021 11:45) (52 - 79)  BP: 100/55 (14 Oct 2021 11:45) (92/57 - 111/71)  BP(mean): --  RR: 18 (14 Oct 2021 11:45) (16 - 20)  SpO2: 100% (14 Oct 2021 11:45) (94% - 100%)  Pleasant female NAD. HEENT  normal  Neck no masses  No NIDIA  Lungs clear  Heart RRR  Abd slightly  tender epigastric LUQ BS +  No pedal edema  Neuro non focal  Psych normal affect                           13.8   8.16  )-----------( 299      ( 10 Oct 2021 23:08 )             42.2   10-10    136  |  103  |  11  ----------------------------<  119<H>  3.5   |  27  |  0.69    Ca    9.1      10 Oct 2021 23:08    TPro  7.7  /  Alb  3.7  /  TBili  0.6  /  DBili  x   /  AST  22  /  ALT  32  /  AlkPhos  77  10-10      C1 19-9   221    MR MRCP reviewed - tumor in pancreatic tail encasing splenic artery, possible retroperitoneal extension; no liver mets    CT chest- no  metastatic disease     Pathology from FNA of pancreatic mass- poorly differentiated carcinoma  HPI:  46 y/o F with PMH hypothyroidism presents for abdominal pain. Patient began having abdominal pain 1 month ago. She went to her PCP initially and workup was negative, so she followed up with her gastroenterologist Dr. Maldonado who ordered an abdominal ultrasound. It showed a mass in the pancreas. She went for subsequent CT abd/pelvis which showed a pancreatic tail mass encasing the spleen. She was going to set up an EGD with biopsy with Dr. Campbell, but her pain acutely worsened on the day of admission. Pain located in the LUQ which is excruciating and extends to her back at times, she also has a discomfort in the epigastric region. Unable to tolerate food, has been drinking smoothies. Reports nausea,  fatigue, dry heaving, unbearable pain with eating food, minimal bowel movements, and constipation. Denies fevers, chills, cough, SOB, lower extremity swelling. She had been on Trulicity for 2 years and Qsyima from April to July for weight loss. She reports weight only fluctuating a few lbs.     ER course: VSS. Labs: CBC unremarkable, eosinophils 6.1%. CMP: Glucose 119. EKG: NSR with HR 62 bpm, normal intervals, no ST segment changes (personally reviewed).     Imaging:   - CXR: no consolidation, effusion, or pneumothorax (personally reviewed).   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 Differential diagnosis would include solid pseudopapillary epithelial neoplasm and mucinous tumor. Recommend further evaluation with MRI. Scattered pulmonary opacities which may represent multifocal infection.     10/12/21 S/p EUS FNA of pancreatic mass. Pain is ongoing issue. Morphine helps. No n/v  + constipation   10/13/21 Pain worse. Dilaudid works better than morphine. No BM. Plan for celiac block today.  10/14/21  S/p attempted celiac plexus block- not successful- no window due to tumor overlying plexus  Had mediport placed. Needs Dilaudid frequently. Dose 0.5 mg nit strong enough. Nausea better with Zofran.      PAST MEDICAL & SURGICAL HISTORY:  Hypothyroid    No pertinent past surgical history      FAMILY HISTORY:  FH: thyroid cancer (Mother)    Family history of cancer of tongue (Mother)    FH: asthma (Father)    FH: lupus (Mother)    Vital Signs Last 24 Hrs  T(C): 36.3 (14 Oct 2021 11:45), Max: 37.1 (13 Oct 2021 15:31)  T(F): 97.4 (14 Oct 2021 11:45), Max: 98.7 (13 Oct 2021 15:31)  HR: 72 (14 Oct 2021 11:45) (52 - 79)  BP: 100/55 (14 Oct 2021 11:45) (92/57 - 111/71)  BP(mean): --  RR: 18 (14 Oct 2021 11:45) (16 - 20)  SpO2: 100% (14 Oct 2021 11:45) (94% - 100%)  Pleasant female NAD. HEENT  normal  Neck no masses  Mediport R upper chest   No NIDIA  Lungs clear  Heart RRR  Abd slightly  tender epigastric LUQ BS +  No pedal edema  Neuro non focal  Psych normal affect                           13.8   8.16  )-----------( 299      ( 10 Oct 2021 23:08 )             42.2   10-10    136  |  103  |  11  ----------------------------<  119<H>  3.5   |  27  |  0.69    Ca    9.1      10 Oct 2021 23:08    TPro  7.7  /  Alb  3.7  /  TBili  0.6  /  DBili  x   /  AST  22  /  ALT  32  /  AlkPhos  77  10-10      C1 19-9   221    MR MRCP reviewed - tumor in pancreatic tail encasing splenic artery, possible retroperitoneal extension; no liver mets    CT chest- no  metastatic disease     Pathology from FNA of pancreatic mass- poorly differentiated carcinoma

## 2021-10-15 ENCOUNTER — TRANSCRIPTION ENCOUNTER (OUTPATIENT)
Age: 46
End: 2021-10-15

## 2021-10-15 ENCOUNTER — OUTPATIENT (OUTPATIENT)
Dept: OUTPATIENT SERVICES | Facility: HOSPITAL | Age: 46
LOS: 1 days | Discharge: HOME | End: 2021-10-15

## 2021-10-15 VITALS
SYSTOLIC BLOOD PRESSURE: 104 MMHG | HEART RATE: 74 BPM | OXYGEN SATURATION: 97 % | DIASTOLIC BLOOD PRESSURE: 62 MMHG | RESPIRATION RATE: 18 BRPM | TEMPERATURE: 98 F

## 2021-10-15 DIAGNOSIS — C25.9 MALIGNANT NEOPLASM OF PANCREAS, UNSPECIFIED: ICD-10-CM

## 2021-10-15 DIAGNOSIS — Z78.9 OTHER SPECIFIED HEALTH STATUS: Chronic | ICD-10-CM

## 2021-10-15 PROCEDURE — 99239 HOSP IP/OBS DSCHRG MGMT >30: CPT | Mod: GC

## 2021-10-15 RX ORDER — ESCITALOPRAM OXALATE 10 MG/1
1 TABLET, FILM COATED ORAL
Qty: 0 | Refills: 0 | DISCHARGE
Start: 2021-10-15

## 2021-10-15 RX ORDER — FENTANYL CITRATE 50 UG/ML
1 INJECTION INTRAVENOUS
Qty: 10 | Refills: 0
Start: 2021-10-15 | End: 2021-11-13

## 2021-10-15 RX ORDER — LEVOTHYROXINE SODIUM 125 MCG
1 TABLET ORAL
Qty: 0 | Refills: 0 | DISCHARGE

## 2021-10-15 RX ORDER — ONDANSETRON 8 MG/1
1 TABLET, FILM COATED ORAL
Qty: 0 | Refills: 0 | DISCHARGE
Start: 2021-10-15

## 2021-10-15 RX ORDER — ONDANSETRON 8 MG/1
1 TABLET, FILM COATED ORAL
Qty: 90 | Refills: 0
Start: 2021-10-15 | End: 2021-11-13

## 2021-10-15 RX ORDER — ONDANSETRON 8 MG/1
4 TABLET, FILM COATED ORAL EVERY 8 HOURS
Refills: 0 | Status: DISCONTINUED | OUTPATIENT
Start: 2021-10-15 | End: 2021-10-15

## 2021-10-15 RX ORDER — ESCITALOPRAM OXALATE 10 MG/1
1 TABLET, FILM COATED ORAL
Qty: 0 | Refills: 0 | DISCHARGE

## 2021-10-15 RX ORDER — PANTOPRAZOLE SODIUM 20 MG/1
1 TABLET, DELAYED RELEASE ORAL
Qty: 30 | Refills: 0
Start: 2021-10-15

## 2021-10-15 RX ORDER — FENTANYL CITRATE 50 UG/ML
1 INJECTION INTRAVENOUS
Refills: 0 | Status: DISCONTINUED | OUTPATIENT
Start: 2021-10-15 | End: 2021-10-15

## 2021-10-15 RX ORDER — LORATADINE 10 MG/1
1 TABLET ORAL
Qty: 30 | Refills: 0
Start: 2021-10-15

## 2021-10-15 RX ORDER — LANOLIN ALCOHOL/MO/W.PET/CERES
5 CREAM (GRAM) TOPICAL AT BEDTIME
Refills: 0 | Status: DISCONTINUED | OUTPATIENT
Start: 2021-10-15 | End: 2021-10-15

## 2021-10-15 RX ORDER — LEVOTHYROXINE SODIUM 125 MCG
1 TABLET ORAL
Qty: 30 | Refills: 0
Start: 2021-10-15

## 2021-10-15 RX ORDER — HYDROMORPHONE HYDROCHLORIDE 2 MG/ML
1 INJECTION INTRAMUSCULAR; INTRAVENOUS; SUBCUTANEOUS
Qty: 80 | Refills: 0
Start: 2021-10-15 | End: 2021-10-24

## 2021-10-15 RX ORDER — ESCITALOPRAM OXALATE 10 MG/1
1 TABLET, FILM COATED ORAL
Qty: 30 | Refills: 0
Start: 2021-10-15

## 2021-10-15 RX ORDER — PANTOPRAZOLE SODIUM 20 MG/1
1 TABLET, DELAYED RELEASE ORAL
Qty: 0 | Refills: 0 | DISCHARGE

## 2021-10-15 RX ADMIN — Medication 5 MILLIGRAM(S): at 01:44

## 2021-10-15 RX ADMIN — FENTANYL CITRATE 1 PATCH: 50 INJECTION INTRAVENOUS at 15:39

## 2021-10-15 RX ADMIN — HYDROMORPHONE HYDROCHLORIDE 2 MILLIGRAM(S): 2 INJECTION INTRAMUSCULAR; INTRAVENOUS; SUBCUTANEOUS at 11:18

## 2021-10-15 RX ADMIN — ONDANSETRON 4 MILLIGRAM(S): 8 TABLET, FILM COATED ORAL at 01:44

## 2021-10-15 RX ADMIN — Medication 137 MICROGRAM(S): at 06:26

## 2021-10-15 RX ADMIN — ONDANSETRON 4 MILLIGRAM(S): 8 TABLET, FILM COATED ORAL at 09:16

## 2021-10-15 RX ADMIN — PANTOPRAZOLE SODIUM 40 MILLIGRAM(S): 20 TABLET, DELAYED RELEASE ORAL at 09:17

## 2021-10-15 RX ADMIN — POLYETHYLENE GLYCOL 3350 17 GRAM(S): 17 POWDER, FOR SOLUTION ORAL at 09:17

## 2021-10-15 RX ADMIN — HYDROMORPHONE HYDROCHLORIDE 1 MILLIGRAM(S): 2 INJECTION INTRAMUSCULAR; INTRAVENOUS; SUBCUTANEOUS at 01:00

## 2021-10-15 RX ADMIN — HYDROMORPHONE HYDROCHLORIDE 1 MILLIGRAM(S): 2 INJECTION INTRAMUSCULAR; INTRAVENOUS; SUBCUTANEOUS at 00:39

## 2021-10-15 RX ADMIN — HYDROMORPHONE HYDROCHLORIDE 2 MILLIGRAM(S): 2 INJECTION INTRAMUSCULAR; INTRAVENOUS; SUBCUTANEOUS at 14:30

## 2021-10-15 RX ADMIN — ESCITALOPRAM OXALATE 10 MILLIGRAM(S): 10 TABLET, FILM COATED ORAL at 09:17

## 2021-10-15 RX ADMIN — FENTANYL CITRATE 1 PATCH: 50 INJECTION INTRAVENOUS at 14:25

## 2021-10-15 RX ADMIN — HYDROMORPHONE HYDROCHLORIDE 1 MILLIGRAM(S): 2 INJECTION INTRAMUSCULAR; INTRAVENOUS; SUBCUTANEOUS at 08:10

## 2021-10-15 RX ADMIN — HYDROMORPHONE HYDROCHLORIDE 2 MILLIGRAM(S): 2 INJECTION INTRAMUSCULAR; INTRAVENOUS; SUBCUTANEOUS at 06:24

## 2021-10-15 RX ADMIN — HYDROMORPHONE HYDROCHLORIDE 1 MILLIGRAM(S): 2 INJECTION INTRAMUSCULAR; INTRAVENOUS; SUBCUTANEOUS at 08:25

## 2021-10-15 RX ADMIN — SODIUM CHLORIDE 75 MILLILITER(S): 9 INJECTION INTRAMUSCULAR; INTRAVENOUS; SUBCUTANEOUS at 01:44

## 2021-10-15 RX ADMIN — Medication 1 GRAM(S): at 09:17

## 2021-10-15 RX ADMIN — HYDROMORPHONE HYDROCHLORIDE 2 MILLIGRAM(S): 2 INJECTION INTRAMUSCULAR; INTRAVENOUS; SUBCUTANEOUS at 12:18

## 2021-10-15 NOTE — DISCHARGE NOTE PROVIDER - NSDCMRMEDTOKEN_GEN_ALL_CORE_FT
Claritin 10 mg oral tablet: 1 tab(s) orally once a day, As Needed  Dilaudid 2 mg oral tablet: 1 tab(s) orally every 3 hours, As Needed -for bladder spasms - for allergy symptoms - for severe pain  -for moderate pain MDD:8 per day   Duragesic-25 transdermal film, extended release: Apply topically to affected area every 72 hours MDD:1 every 3 days  escitalopram 10 mg oral tablet: 1 tab(s) orally once a day  ondansetron 4 mg oral tablet, disintegratin tab(s) orally every 8 hours, As needed, Nausea and/or Vomiting  pantoprazole 40 mg oral delayed release tablet: 1 tab(s) orally once a day, As Needed  Synthroid 137 mcg (0.137 mg) oral tablet: 1 tab(s) orally once a day   Dilaudid 2 mg oral tablet: 1 tab(s) orally every 3 hours, As Needed -for bladder spasms - for allergy symptoms - for severe pain  -for moderate pain MDD:8 per day   Duragesic-25 transdermal film, extended release: Apply topically to affected area every 72 hours MDD:1 every 3 days  Lexapro 10 mg oral tablet: 1 tab(s) orally once a day  loratadine 10 mg oral tablet: 1 tab(s) orally once a day, As needed, allergies  ondansetron 4 mg oral tablet, disintegratin tab(s) orally every 8 hours, As needed, Nausea and/or Vomiting  pantoprazole 40 mg oral delayed release tablet: 1 tab(s) orally once a day, As Needed  Synthroid 137 mcg (0.137 mg) oral tablet: 1 tab(s) orally once a day

## 2021-10-15 NOTE — DISCHARGE NOTE PROVIDER - CARE PROVIDERS DIRECT ADDRESSES
[email or web address],DirectAddress_Unknown,DirectAddress_Unknown

## 2021-10-15 NOTE — DISCHARGE NOTE PROVIDER - NSDCCPCAREPLAN_GEN_ALL_CORE_FT
PRINCIPAL DISCHARGE DIAGNOSIS  Diagnosis: Pancreatic mass  Assessment and Plan of Treatment: You presented to [facility] for abdominal pain and were found to have a mass that covers the tail end of your pancreas as well as some splenic vessels. You were seen by Dr. Mendez Hematology/Oncology, Dr. Howell Surgical Oncology and Dr. Campbell Gastroenterology. You will follow with Dr. Mendez outpatient on Monday 10/18 for scheduled sessions of chemotherapy. You will follow with Dr. Howell outpatient to cooridnate when the pancreatic mass can be resected. Reccomend close follow up with your PCP Dr. Davis within a week of discharge.      SECONDARY DISCHARGE DIAGNOSES  Diagnosis: Thrombosis of splenic artery  Assessment and Plan of Treatment: You presented to [facility] for abdominal pain and were found to have a mass of your pancreas that was causing blockage of your splenic artery. Hematology was consulted and you were seen by Dr. Mendez who did not deem anti coagulation necessary. You will follow up outpatient with Dr. Mendez. Reccomend close follow up with your PCP.    Diagnosis: Abdominal pain  Assessment and Plan of Treatment: You presented to [facility] with abdominal pain. You were found to have a pancreatic mass on imaging. You were seen by Dr. Campbell Gastroenterology who attempted a nerve block of the celiac plexus. The procedure was unsuccessful as there was no route to reach the celiac trunk without puncturing the pancreatic mass which would have led to possible spread of the cancer via bloodstream. You were given IV Dilaudid and a Fentanyl patch for pain control. You were countinue to take the Fentanyl patch and Dilaudid. Reccomend close follow up with PCP Dr. Davis.

## 2021-10-15 NOTE — DISCHARGE NOTE PROVIDER - CARE PROVIDER_API CALL
Caprice Cabello  HEMATOLOGY  [address], [place] [address]  Phone: [phone]  Fax: [phone]  Established Patient  Follow Up Time:     Juni Howell)  Complex General Surgical Oncology; Surgery; Surgical Oncology  [address] [place] [address]  Phone: [phone]  Fax: [phone]  Established Patient  Follow Up Time:     Omar Campbell)  Gastroenterology; Internal Medicine  [address] [place] [address]  Phone: [phone]  Fax: [phone]  Established Patient  Follow Up Time:     Erlinda Davis)  Family Medicine  [address] [place] [address]  Phone: [phone]  Fax: [phone]  Established Patient  Follow Up Time:

## 2021-10-15 NOTE — DISCHARGE NOTE PROVIDER - NSDCFUSCHEDAPPT_GEN_ALL_CORE_FT
BOUCHRA PALACIOS ; 10/18/2021 ; [facility]  BOUCHRA PALACIOS ; 10/19/2021 ; [facility]  BOUCHRA PALACIOS ; 10/21/2021 ; [facility] [address]  BOUCHRA PALACIOS ; 10/21/2021 ; [facility]  BOUCHRA PALACIOS ; 10/26/2021 ; [facility]  BOUCHRA PALACIOS ; 11/02/2021 ; [facility]  BOUCHRA PALAICOS ; 11/04/2021 ; [facility]  BOUCHRA PALACIOS ; 11/23/2021 ; [facility] G[facility]  [address]

## 2021-10-15 NOTE — DISCHARGE NOTE NURSING/CASE MANAGEMENT/SOCIAL WORK - PATIENT PORTAL LINK FT
You can access the FollowMyHealth Patient Portal offered by [facility] by registering at the following website: [email or web address]. By joining [facility]’s FollowMyHealth portal, you will also be able to view your health information using other applications (apps) compatible with our system.

## 2021-10-15 NOTE — DISCHARGE NOTE PROVIDER - HOSPITAL COURSE
45 year old female with a PMHx of hypothyroidism and depression presented to the [facility] ED on 10/11 for abdominal pain that had lasted for a month. Prior to presentation patient had been worked up by her gastroenterologist outpatient who had ordered an abdominal ultrasound. Ultrasound at that time showed a pancreatic mass. Subsequent CT abd/pelvis showed a pancreatic tail mass with splenic encasement. Patient was scheduled for a EGD with biopsy with Dr. Campbell however patient was in acute distress and presented to ED on 10/10. Gastroenterology was consulted and patient was seen by Dr. Campbell who performed a EUS FNB. 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 Hem/Onc was consulted and patient was evaluated with Dr. Mendez who did not recommend anti-coagulation because the thrombosis was a direct result of the tumor encasement. Patient was also seen by Dr. Howell Surgical Oncology who plans to resect the mass. Patient case was presented at a GI Tumour board and a 12 cycle course of Folfirinox prior to surgical resection was planned. CXR and CT Abdomen indicated no metastatic disease to the lungs or liver. During hospitalization a nerve block of the celiac plexus was attempted by Adrian but was unsuccessful due to risk of puncturing and seeding the pancreatic mass. Patient's plain was controlled via IV Dilaudid and a Fentanyl patch. Patient will continue to take PO Dilaudid and apply the fentanyl patch. Patient also underwent a procedure with Interventional Radiology to implant a chemotherapy port. Patient will see Dr. Mendez outpatient on 10/18 and will begin chemotherapy 10/19. Patient will continue to take all other home meds including zofran for nausea. Recommend close follow up with PCP Dr. Davis within 1 week of discharge.

## 2021-10-15 NOTE — DISCHARGE NOTE PROVIDER - PROVIDER TOKENS
PROVIDER:[TOKEN:[[id number]],ESTABLISHEDPATIENT:[T]],PROVIDER:[TOKEN:[[id number]],ESTABLISHEDPATIENT:[T]],PROVIDER:[TOKEN:[[id number]],ESTABLISHEDPATIENT:[T]],PROVIDER:[TOKEN:[[id number]],ESTABLISHEDPATIENT:[T]]

## 2021-10-18 ENCOUNTER — APPOINTMENT (OUTPATIENT)
Dept: HEMATOLOGY ONCOLOGY | Facility: CLINIC | Age: 46
End: 2021-10-18

## 2021-10-19 ENCOUNTER — RESULT REVIEW (OUTPATIENT)
Age: 46
End: 2021-10-19

## 2021-10-19 ENCOUNTER — APPOINTMENT (OUTPATIENT)
Dept: INFUSION THERAPY | Facility: CLINIC | Age: 46
End: 2021-10-19

## 2021-10-19 VITALS
HEIGHT: 63 IN | SYSTOLIC BLOOD PRESSURE: 104 MMHG | WEIGHT: 177 LBS | HEART RATE: 67 BPM | BODY MASS INDEX: 31.36 KG/M2 | DIASTOLIC BLOOD PRESSURE: 69 MMHG

## 2021-10-19 LAB
ALBUMIN SERPL ELPH-MCNC: 4 G/DL — SIGNIFICANT CHANGE UP (ref 3.3–5)
ALP SERPL-CCNC: 74 U/L — SIGNIFICANT CHANGE UP (ref 40–120)
ALT FLD-CCNC: 24 U/L — SIGNIFICANT CHANGE UP (ref 10–45)
ANION GAP SERPL CALC-SCNC: 12 MMOL/L — SIGNIFICANT CHANGE UP (ref 5–17)
AST SERPL-CCNC: 15 U/L — SIGNIFICANT CHANGE UP (ref 10–40)
BASOPHILS # BLD AUTO: 0.04 K/UL — SIGNIFICANT CHANGE UP (ref 0–0.2)
BASOPHILS NFR BLD AUTO: 0.5 % — SIGNIFICANT CHANGE UP (ref 0–2)
BILIRUB SERPL-MCNC: 0.5 MG/DL — SIGNIFICANT CHANGE UP (ref 0.2–1.2)
BUN SERPL-MCNC: 7 MG/DL — SIGNIFICANT CHANGE UP (ref 7–23)
CALCIUM SERPL-MCNC: 9.5 MG/DL — SIGNIFICANT CHANGE UP (ref 8.4–10.5)
CHLORIDE SERPL-SCNC: 102 MMOL/L — SIGNIFICANT CHANGE UP (ref 96–108)
CO2 SERPL-SCNC: 27 MMOL/L — SIGNIFICANT CHANGE UP (ref 22–31)
CREAT SERPL-MCNC: 0.72 MG/DL — SIGNIFICANT CHANGE UP (ref 0.5–1.3)
EOSINOPHIL # BLD AUTO: 0.4 K/UL — SIGNIFICANT CHANGE UP (ref 0–0.5)
EOSINOPHIL NFR BLD AUTO: 4.9 % — SIGNIFICANT CHANGE UP (ref 0–6)
FERRITIN SERPL-MCNC: 194 NG/ML — HIGH (ref 15–150)
FOLATE SERPL-MCNC: 5.5 NG/ML — SIGNIFICANT CHANGE UP
GLUCOSE SERPL-MCNC: 95 MG/DL — SIGNIFICANT CHANGE UP (ref 70–99)
HCT VFR BLD CALC: 39.4 % — SIGNIFICANT CHANGE UP (ref 34.5–45)
HGB BLD-MCNC: 13 G/DL — SIGNIFICANT CHANGE UP (ref 11.5–15.5)
IMM GRANULOCYTES NFR BLD AUTO: 0.2 % — SIGNIFICANT CHANGE UP (ref 0–1.5)
IRON SATN MFR SERPL: 11 % — LOW (ref 14–50)
IRON SATN MFR SERPL: 23 UG/DL — LOW (ref 30–160)
LYMPHOCYTES # BLD AUTO: 1.81 K/UL — SIGNIFICANT CHANGE UP (ref 1–3.3)
LYMPHOCYTES # BLD AUTO: 22.3 % — SIGNIFICANT CHANGE UP (ref 13–44)
MCHC RBC-ENTMCNC: 29.1 PG — SIGNIFICANT CHANGE UP (ref 27–34)
MCHC RBC-ENTMCNC: 33 GM/DL — SIGNIFICANT CHANGE UP (ref 32–36)
MCV RBC AUTO: 88.3 FL — SIGNIFICANT CHANGE UP (ref 80–100)
MONOCYTES # BLD AUTO: 0.94 K/UL — HIGH (ref 0–0.9)
MONOCYTES NFR BLD AUTO: 11.6 % — SIGNIFICANT CHANGE UP (ref 2–14)
NEUTROPHILS # BLD AUTO: 4.9 K/UL — SIGNIFICANT CHANGE UP (ref 1.8–7.4)
NEUTROPHILS NFR BLD AUTO: 60.5 % — SIGNIFICANT CHANGE UP (ref 43–77)
NRBC # BLD: 0 /100 WBCS — SIGNIFICANT CHANGE UP (ref 0–0)
PLATELET # BLD AUTO: 259 K/UL — SIGNIFICANT CHANGE UP (ref 150–400)
POTASSIUM SERPL-MCNC: 4.3 MMOL/L — SIGNIFICANT CHANGE UP (ref 3.5–5.3)
POTASSIUM SERPL-SCNC: 4.3 MMOL/L — SIGNIFICANT CHANGE UP (ref 3.5–5.3)
PROT SERPL-MCNC: 6.7 G/DL — SIGNIFICANT CHANGE UP (ref 6–8.3)
RBC # BLD: 4.46 M/UL — SIGNIFICANT CHANGE UP (ref 3.8–5.2)
RBC # FLD: 12.2 % — SIGNIFICANT CHANGE UP (ref 10.3–14.5)
SODIUM SERPL-SCNC: 142 MMOL/L — SIGNIFICANT CHANGE UP (ref 135–145)
TIBC SERPL-MCNC: 204 UG/DL — LOW (ref 220–430)
UIBC SERPL-MCNC: 181 UG/DL — SIGNIFICANT CHANGE UP (ref 110–370)
VIT B12 SERPL-MCNC: 790 PG/ML — SIGNIFICANT CHANGE UP (ref 232–1245)
WBC # BLD: 8.11 K/UL — SIGNIFICANT CHANGE UP (ref 3.8–10.5)
WBC # FLD AUTO: 8.11 K/UL — SIGNIFICANT CHANGE UP (ref 3.8–10.5)

## 2021-10-20 DIAGNOSIS — R11.2 NAUSEA WITH VOMITING, UNSPECIFIED: ICD-10-CM

## 2021-10-20 DIAGNOSIS — Z51.11 ENCOUNTER FOR ANTINEOPLASTIC CHEMOTHERAPY: ICD-10-CM

## 2021-10-20 PROBLEM — Z80.8 FAMILY HISTORY OF MALIGNANT NEOPLASM OF TONGUE: Status: ACTIVE | Noted: 2021-10-20

## 2021-10-20 PROBLEM — Z80.8 FAMILY HISTORY OF MALIGNANT NEOPLASM OF THYROID: Status: ACTIVE | Noted: 2021-10-20

## 2021-10-20 PROBLEM — Z13.79 GENETIC TESTING: Status: RESOLVED | Noted: 2021-10-20 | Resolved: 2021-10-20

## 2021-10-20 PROBLEM — I82.890 SPLENIC VEIN THROMBOSIS: Status: ACTIVE | Noted: 2021-10-20

## 2021-10-20 LAB — ERYTHROCYTE [SEDIMENTATION RATE] IN BLOOD: 55 MM/HR — HIGH (ref 0–15)

## 2021-10-21 ENCOUNTER — RESULT REVIEW (OUTPATIENT)
Age: 46
End: 2021-10-21

## 2021-10-21 ENCOUNTER — APPOINTMENT (OUTPATIENT)
Dept: SURGICAL ONCOLOGY | Facility: CLINIC | Age: 46
End: 2021-10-21

## 2021-10-21 ENCOUNTER — APPOINTMENT (OUTPATIENT)
Dept: INFUSION THERAPY | Facility: CLINIC | Age: 46
End: 2021-10-21

## 2021-10-21 ENCOUNTER — APPOINTMENT (OUTPATIENT)
Dept: SURGICAL ONCOLOGY | Facility: CLINIC | Age: 46
End: 2021-10-21
Payer: COMMERCIAL

## 2021-10-21 ENCOUNTER — APPOINTMENT (OUTPATIENT)
Dept: HEMATOLOGY ONCOLOGY | Facility: CLINIC | Age: 46
End: 2021-10-21

## 2021-10-21 VITALS
DIASTOLIC BLOOD PRESSURE: 64 MMHG | TEMPERATURE: 98.2 F | SYSTOLIC BLOOD PRESSURE: 93 MMHG | HEART RATE: 88 BPM | HEIGHT: 63 IN | BODY MASS INDEX: 31.01 KG/M2 | WEIGHT: 175 LBS

## 2021-10-21 DIAGNOSIS — R10.9 UNSPECIFIED ABDOMINAL PAIN: ICD-10-CM

## 2021-10-21 DIAGNOSIS — C25.2 MALIGNANT NEOPLASM OF TAIL OF PANCREAS: ICD-10-CM

## 2021-10-21 DIAGNOSIS — E03.9 HYPOTHYROIDISM, UNSPECIFIED: ICD-10-CM

## 2021-10-21 DIAGNOSIS — R91.8 OTHER NONSPECIFIC ABNORMAL FINDING OF LUNG FIELD: ICD-10-CM

## 2021-10-21 DIAGNOSIS — F32.A DEPRESSION, UNSPECIFIED: ICD-10-CM

## 2021-10-21 DIAGNOSIS — I74.8 EMBOLISM AND THROMBOSIS OF OTHER ARTERIES: ICD-10-CM

## 2021-10-21 PROCEDURE — 99214 OFFICE O/P EST MOD 30 MIN: CPT

## 2021-10-22 ENCOUNTER — NON-APPOINTMENT (OUTPATIENT)
Age: 46
End: 2021-10-22

## 2021-10-22 ENCOUNTER — APPOINTMENT (OUTPATIENT)
Dept: INFUSION THERAPY | Facility: CLINIC | Age: 46
End: 2021-10-22

## 2021-10-22 ENCOUNTER — RESULT REVIEW (OUTPATIENT)
Age: 46
End: 2021-10-22

## 2021-10-22 VITALS
DIASTOLIC BLOOD PRESSURE: 66 MMHG | TEMPERATURE: 98 F | BODY MASS INDEX: 31 KG/M2 | SYSTOLIC BLOOD PRESSURE: 99 MMHG | WEIGHT: 175 LBS | HEART RATE: 71 BPM

## 2021-10-22 DIAGNOSIS — E86.0 DEHYDRATION: ICD-10-CM

## 2021-10-22 LAB — SURGICAL PATHOLOGY STUDY: SIGNIFICANT CHANGE UP

## 2021-10-25 ENCOUNTER — NON-APPOINTMENT (OUTPATIENT)
Age: 46
End: 2021-10-25

## 2021-10-25 ENCOUNTER — APPOINTMENT (OUTPATIENT)
Dept: INFUSION THERAPY | Facility: CLINIC | Age: 46
End: 2021-10-25

## 2021-10-26 ENCOUNTER — APPOINTMENT (OUTPATIENT)
Dept: INFUSION THERAPY | Facility: CLINIC | Age: 46
End: 2021-10-26
Payer: COMMERCIAL

## 2021-10-26 ENCOUNTER — RESULT REVIEW (OUTPATIENT)
Age: 46
End: 2021-10-26

## 2021-10-26 ENCOUNTER — NON-APPOINTMENT (OUTPATIENT)
Age: 46
End: 2021-10-26

## 2021-10-26 ENCOUNTER — APPOINTMENT (OUTPATIENT)
Dept: HEMATOLOGY ONCOLOGY | Facility: CLINIC | Age: 46
End: 2021-10-26
Payer: COMMERCIAL

## 2021-10-26 VITALS — SYSTOLIC BLOOD PRESSURE: 110 MMHG | DIASTOLIC BLOOD PRESSURE: 72 MMHG | TEMPERATURE: 97.5 F | HEART RATE: 92 BPM

## 2021-10-26 VITALS — WEIGHT: 169 LBS | BODY MASS INDEX: 29.95 KG/M2 | HEIGHT: 63 IN

## 2021-10-26 DIAGNOSIS — Z13.79 ENCOUNTER FOR OTHER SCREENING FOR GENETIC AND CHROMOSOMAL ANOMALIES: ICD-10-CM

## 2021-10-26 DIAGNOSIS — Z80.8 FAMILY HISTORY OF MALIGNANT NEOPLASM OF OTHER ORGANS OR SYSTEMS: ICD-10-CM

## 2021-10-26 DIAGNOSIS — I82.890 ACUTE EMBOLISM AND THROMBOSIS OF OTHER SPECIFIED VEINS: ICD-10-CM

## 2021-10-26 LAB
BASOPHILS # BLD AUTO: 0.01 K/UL — SIGNIFICANT CHANGE UP (ref 0–0.2)
BASOPHILS NFR BLD AUTO: 0.3 % — SIGNIFICANT CHANGE UP (ref 0–2)
EOSINOPHIL # BLD AUTO: 0.04 K/UL — SIGNIFICANT CHANGE UP (ref 0–0.5)
EOSINOPHIL NFR BLD AUTO: 1.2 % — SIGNIFICANT CHANGE UP (ref 0–6)
HCT VFR BLD CALC: 35.6 % — SIGNIFICANT CHANGE UP (ref 34.5–45)
HGB BLD-MCNC: 12 G/DL — SIGNIFICANT CHANGE UP (ref 11.5–15.5)
IMM GRANULOCYTES NFR BLD AUTO: 0.6 % — SIGNIFICANT CHANGE UP (ref 0–1.5)
LYMPHOCYTES # BLD AUTO: 1.49 K/UL — SIGNIFICANT CHANGE UP (ref 1–3.3)
LYMPHOCYTES # BLD AUTO: 44.2 % — HIGH (ref 13–44)
MCHC RBC-ENTMCNC: 29.1 PG — SIGNIFICANT CHANGE UP (ref 27–34)
MCHC RBC-ENTMCNC: 33.7 GM/DL — SIGNIFICANT CHANGE UP (ref 32–36)
MCV RBC AUTO: 86.4 FL — SIGNIFICANT CHANGE UP (ref 80–100)
MONOCYTES # BLD AUTO: 0.21 K/UL — SIGNIFICANT CHANGE UP (ref 0–0.9)
MONOCYTES NFR BLD AUTO: 6.2 % — SIGNIFICANT CHANGE UP (ref 2–14)
NEUTROPHILS # BLD AUTO: 1.6 K/UL — LOW (ref 1.8–7.4)
NEUTROPHILS NFR BLD AUTO: 47.5 % — SIGNIFICANT CHANGE UP (ref 43–77)
NRBC # BLD: 0 /100 WBCS — SIGNIFICANT CHANGE UP (ref 0–0)
PLATELET # BLD AUTO: 160 K/UL — SIGNIFICANT CHANGE UP (ref 150–400)
RBC # BLD: 4.12 M/UL — SIGNIFICANT CHANGE UP (ref 3.8–5.2)
RBC # FLD: 11.9 % — SIGNIFICANT CHANGE UP (ref 10.3–14.5)
WBC # BLD: 3.37 K/UL — LOW (ref 3.8–10.5)
WBC # FLD AUTO: 3.37 K/UL — LOW (ref 3.8–10.5)

## 2021-10-26 PROCEDURE — 99215 OFFICE O/P EST HI 40 MIN: CPT

## 2021-10-26 RX ORDER — OMEPRAZOLE 40 MG/1
40 CAPSULE, DELAYED RELEASE ORAL
Qty: 90 | Refills: 3 | Status: DISCONTINUED | COMMUNITY
Start: 2021-10-26 | End: 2021-10-26

## 2021-10-26 RX ORDER — HYDROCODONE BITARTRATE AND ACETAMINOPHEN 5; 325 MG/1; MG/1
5-325 TABLET ORAL
Qty: 60 | Refills: 0 | Status: DISCONTINUED | COMMUNITY
Start: 2021-10-06 | End: 2021-10-26

## 2021-10-26 RX ORDER — ONDANSETRON 4 MG/1
4 TABLET ORAL EVERY 6 HOURS
Qty: 100 | Refills: 2 | Status: DISCONTINUED | COMMUNITY
Start: 2021-10-14 | End: 2021-10-26

## 2021-10-26 NOTE — HISTORY OF PRESENT ILLNESS
[de-identified] : Ms. BOUCHRA PALACIOS  is a 45 year  old female  presenting for a post hospitalization visit. \par \par HOSPITAL COURSE @ [facility] 10/11/2021-10/15/2021: \par 45 year old female with a PMHx of hypothyroidism and depression presented to the [facility] ED on 10/11 for abdominal pain that had lasted for a month. Prior to presentation patient had been worked up by her gastroenterologist outpatient who had ordered an abdominal ultrasound. Ultrasound at that time showed a pancreatic mass. Subsequent CT abd/pelvis showed a pancreatic tail mass with splenic encasement. Patient was scheduled for a EGD with biopsy with Dr. Campbell however patient was in acute distress and presented to ED on 10/10. Gastroenterology was consulted and patient was seen by Dr. Campbell who performed a EUS FNB. 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 Hem/Onc was consulted and patient was evaluated with Dr. Mendez who did not recommend anti-coagulation because the thrombosis was a direct result of the tumor encasement. Patient was also seen by Dr. Howell Surgical Oncology who plans to resect the mass. Patient case was presented at a GI Tumour board and a 12 cycle course of Folfirinox prior to surgical resection was planned. CXR and CT Abdomen indicated no metastatic disease to the lungs or liver. During hospitalization a nerve block of the celiac plexus was attempted by Adrian but was unsuccessful due to risk of puncturing and seeding the pancreatic mass. Patient's plain was controlled via IV Dilaudid and a Fentanyl patch. Patient \par will continue to take PO Dilaudid and apply the fentanyl patch. Patient also underwent a procedure with Interventional Radiology to implant a chemotherapy port. Patient will see Dr. Mendez outpatient on 10/18 and will begin chemotherapy 10/19. Patient will continue to take all other home meds including zofran for nausea. Recommend close follow up with PCP Dr. Davis within 1 \par week of discharge. \par \par Pancreas tail biopsy 10/11/2021- PATH: Poorly differentiated carcinoma\par \par Pts paternal aunt underwent genetic testing and was found to be BRCA 2+.  Pt. then decided to pursue genetic Testing in 2021 which revealed she is a BRCA 2+ gene carrier.  She met with Dr. Vera (Genetic counselor) and will undergo more extensive genetic testing. Results pending. \par \par PMH: Depression, hypothyroidism, BRCA 2+, \par PSH:  EGD/EUS\par Social Hx: Never a smoker, social alcohol use\par Family Hx: \par \par 10/21/2021- Pt. was started on neoadjuvant chemotherapy with FOLFIRINOX on 10/19/2021.  Having nausea and diarrhea.  Also pain in back with poor pain control with narcotic medication.

## 2021-10-26 NOTE — HISTORY OF PRESENT ILLNESS
[Disease: _____________________] : Disease: [unfilled] [T: ___] : T[unfilled] [N: ___] : N[unfilled] [AJCC Stage: ____] : AJCC Stage: [unfilled] [de-identified] : BOUCHRA PALACIOS is a 45 y.o. with a PMH significant for hypothyroidism, who we are following for a clinical stage III pancreatic tail cancer.\par \par ~8/2021 - Presented at age 45 with abdominal pains.  Saw PCP, referred to GI Dr. Maldonado. \par 9/30/21 - Abd US - 3.9 x 4.6 x 4.8cm heterogeneous mass in region of pancreatic tail. \par 10/7/21 - CT A/P - pancreatic tail mass with splenic artery encasement and splenic vein thrombosis.  Borderline PALN's. \par 10/11/21 - 10/15/21 - Admit to  for severe pain.  Was set up for outpatient EGD with biopsy but had severe pains, nausea, dry-heaving and so went to ER. \par 10/11/21 - CT Chest - no evidence of disease.\par 10/11/21 - MRCP - tumor encases splenic artery, celiac artery contact <180, splenic vein occlusion.  Possible retroperitoneal extension with left adrenal gland contact and possible extension into left celiac ganglion.\par 10/12/21 - EUS, FNA pancreatic mass - scanty specimen - PATH - poorly differentiated carcinoma. \par 10/14/21 - Celiac block attempted - not successful - no window due to tumor overlying plexus. \par Case presented at tumor board - for NEOAdjuvant FOLFIRINOX up to 12 cycles, reassess after 4.  Consider chemoRT if suboptimal response. 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 \par Dr. Howell - surgeon. \par 10/19/21 - Started FOLFIRINOX [de-identified] :  poorly differentiated carcinoma [de-identified] : 10/18/21 - [facility] Comprehensive genetic panel - pending\par 10/22/21 - UGT1A1 - pending [de-identified] : Patient reports had a hard time after chemo. \par D1,2 after chemo mostly slept. D3 came for disconnect - states again mostly slept.\lakhwinder Came to office on Friday for hydration.  At this time she reported she had been having some diarrhea, but on review she had stopped all of her narcotics - was not taking any Dilaudid while she was sleeping and she was not on the Fentanyl for a few days as there was a delay in picking it up as it needed prior auth.  She had restarted the Fentanyl Fri morning and was starting to feel better that afternoon.\lakhwinder Sat (D4) felt it started to get very bad - started to have bad diarrhea and vomiting.  Had been eating more Fri and Sat and then "took a turn for the worse". \lakhwinder Was taking antiemetics - alternated between Zofran and Compazine.  Emesis was intitially undigested food, then turned to bile.  \lakhwinder Started Imodium - 2 on Sat with 1st episode, then took 1 tabs ~ Q3 hours (took 5 - 6 over about 9 hours - may have thrown it up in between).  \par Sunday did about the same.  \par Yesterday slept, ate nothing - just had sips of ginger ale.  \par Last BM mild bile/water at ~ 2PM yesterday.  Was supposed to come for hydration yesterday but just wanted to rest.\lakhwinder Now reports voiding very little. \par Patient on Fentanyl 25, and Dilaudid 2mg - taking 1 Q3 - taking about 5 in a day.  Pain is where the tumor is - feel like a sharp stabbing pain that doesn't go away.  Best position is sitting up.  \lakhwinder States had Oxaliplatin cold neuropathies but they have resolved.  Throat scratchy now.  \lakhwinder Has appointment to see Rad/Onc (?)  this Thurs. \lakhwinder Denies any other changes in her family, medical, or sociah history since her last visit of 10/21/21.\lakhwinder  [10 - Extreme Distress] : Distress Level: 10 [Referred Patient  to social work for follow-up] : Patient was referred to social work for follow-up

## 2021-10-26 NOTE — PHYSICAL EXAM
[Normal] : supple, no neck mass and thyroid not enlarged [Normal Neck Lymph Nodes] : normal neck lymph nodes  [Normal Supraclavicular Lymph Nodes] : normal supraclavicular lymph nodes [Normal Groin Lymph Nodes] : normal groin lymph nodes [Normal Axillary Lymph Nodes] : normal axillary lymph nodes [Normal] : oriented to person, place and time, with appropriate affect [de-identified] : soft NT ND

## 2021-10-26 NOTE — REVIEW OF SYSTEMS
[Patient Intake Form Reviewed] : Patient intake form was reviewed [Recent Change In Weight] : ~T recent weight change [Abdominal Pain] : abdominal pain [Vomiting] : vomiting [Diarrhea] : diarrhea [Negative] : Allergic/Immunologic [FreeTextEntry2] : lost 6 pounds [FreeTextEntry4] : no taste.  Throat a bit scratchy [FreeTextEntry7] : Nausea more manageable today - but didn't try to eat yet.  Abd pain = LUQ cancer pain.  [FreeTextEntry8] : Notes low urine output today [de-identified] : Occasional lightheadedness [de-identified] : Hard time sleeping due to cancer pain

## 2021-10-26 NOTE — ASSESSMENT
[FreeTextEntry1] : Patient is a 45 y.o. with a clinical stage III pancreatic tail cancer (T4).\par 10/19/21- Started on NEOAdjuvant FOLFIRINOX.\par Today D8 C1.  Patient has had a tough time with N/V/D. \par \par Plan:\par 1. N/V -  Suspect this is acute due to patient's young age.  Will add in Olanzapine and also Decadron  - This was written out for her on a calendar explaining how to take.  Also explained that she can come for hydration if needed. \par 2. Taste changes - Discussed that "Umami" flavors are usually unaffected by chemotherapy.  Will ask nutritionist to help patient with menu ideas.\par 3. Diarrhea - unclear how much was related to chemo and how much was related to narcotic withdrawal.  Patient aware she is now tolerant to narcotics and cannot stop suddenly.  Pattern atypical for this regime - if has further diarrhea then we can consider it to be related to the chemo.   UGT1A1 pending. \par 4. Pain - Patient needs meds renewed.  Feels Fentanyl helping a lot -> will increase to 50mcg -> this was ordered but she can also put on (2) 25 mcg patches for now. Dilaudid renewed.  .  \par 5. Onc - Continue for up to 12 cycles.  Restage after 4th cycle.

## 2021-10-26 NOTE — ASSESSMENT
[FreeTextEntry1] : Ms. BOUCHRA PALACIOS  is a 45 year  old female with PMHx hypothyroid, depression and BRCA2+, recently diagnosed with clinical stage III pancreatic tail cancer.  Pt. was started on neoadjuvant FOLFIRINOX on 10/19/2021.  Pt. will continue for up to 12 cycles, restage after 4th cycle. Having poor pain control at this time.\par \par PLAN:\par 1) Med onc - chemotherapy\par 2) Will call IR at [facility] to discuss percutaneous celiac neurolysis\par 3) Re-image after 4 cycles to assess response\par 4) RTO after imaging\par

## 2021-10-26 NOTE — PHYSICAL EXAM
[Normal] : affect appropriate [de-identified] : Young woman  [de-identified] : anicteric [de-identified] : No C/C/E, right port without s/s infection [de-identified] : no rashes

## 2021-10-26 NOTE — REASON FOR VISIT
[Follow-Up Visit] : a follow-up [Pre-Treatment Visit] : a pre-treatment [Other: _____] : [unfilled] [FreeTextEntry2] : Pancreatic Cancer - D8 C1 NEOAdjuvant FOLFIRINOX

## 2021-10-26 NOTE — CONSULT LETTER
[Dear  ___] : Dear  [unfilled], [Courtesy Letter:] : I had the pleasure of seeing your patient, [unfilled], in my office today. [Please see my note below.] : Please see my note below. [Consult Closing:] : Thank you very much for allowing me to participate in the care of this patient.  If you have any questions, please do not hesitate to contact me. [Sincerely,] : Sincerely, [FreeTextEntry3] : Juni Howell MD, MPH, FACS, FSSO\par , [facility] General Surgical Oncology Fellowship\par [facility] [place]\par Associate Professor of Surgery\par [employer] [facility]  [DrVidhi  ___] : Dr. MENDEZ [DrVidhi ___] : Dr. MENDEZ

## 2021-10-27 ENCOUNTER — NON-APPOINTMENT (OUTPATIENT)
Age: 46
End: 2021-10-27

## 2021-10-27 LAB
ALBUMIN SERPL ELPH-MCNC: 3.7 G/DL — SIGNIFICANT CHANGE UP (ref 3.3–5)
ALP SERPL-CCNC: 74 U/L — SIGNIFICANT CHANGE UP (ref 40–120)
ALT FLD-CCNC: 24 U/L — SIGNIFICANT CHANGE UP (ref 10–45)
ANION GAP SERPL CALC-SCNC: 13 MMOL/L — SIGNIFICANT CHANGE UP (ref 5–17)
AST SERPL-CCNC: 17 U/L — SIGNIFICANT CHANGE UP (ref 10–40)
BILIRUB SERPL-MCNC: 0.6 MG/DL — SIGNIFICANT CHANGE UP (ref 0.2–1.2)
BUN SERPL-MCNC: 4 MG/DL — LOW (ref 7–23)
CALCIUM SERPL-MCNC: 8.4 MG/DL — SIGNIFICANT CHANGE UP (ref 8.4–10.5)
CHLORIDE SERPL-SCNC: 99 MMOL/L — SIGNIFICANT CHANGE UP (ref 96–108)
CO2 SERPL-SCNC: 23 MMOL/L — SIGNIFICANT CHANGE UP (ref 22–31)
CREAT SERPL-MCNC: 0.57 MG/DL — SIGNIFICANT CHANGE UP (ref 0.5–1.3)
GLUCOSE SERPL-MCNC: 101 MG/DL — HIGH (ref 70–99)
POTASSIUM SERPL-MCNC: 3.3 MMOL/L — LOW (ref 3.5–5.3)
POTASSIUM SERPL-SCNC: 3.3 MMOL/L — LOW (ref 3.5–5.3)
PROT SERPL-MCNC: 5.9 G/DL — LOW (ref 6–8.3)
SODIUM SERPL-SCNC: 135 MMOL/L — SIGNIFICANT CHANGE UP (ref 135–145)

## 2021-10-28 NOTE — ASSESSMENT
[FreeTextEntry1] : 55yo F with severe abdominal pain 2/2 stage III Pancreatic tail CA. Risks and benefits of celiac plexus block were explained to pt and possible side effects were discussed. Plan is to see how the new pain regimen works for the pt for a few days and follow-up with her on Monday.

## 2021-10-28 NOTE — HISTORY OF PRESENT ILLNESS
[FreeTextEntry1] : 46yo F with hx of hypothyroidism, stage III pancreatic tail cancer. Pt originally presented to Dr. Thurman office 8/2021 with complaints of abdominal pain. US at that time showed 3.9x4.6x4.8cm pancreatic tail mass. CT showed encasement of splenic artery and splenic vein. 10/11/21 pt was admitted for severe pain. 10/12/21 pt had biopsy of pancreatic mass. 10/14/21 Celiac plexus block was unsuccessful. 10/19/21 pt Started on FOLFIRINOX. \par \par Pt was called and consented for telehealth consult. Pt reported that she has good and bad days with her abdominal pain. Any PO intake causes her pain to increase, described as "excruciating with nausea", which has led to weight loss. The pain radiates from the L upper abdomen to the L flank and back. Pt also states that she is about to try a new pain regimen, since her last one was not helping.   [10] : ~His/Her~ pain was 10 out of 10 [Abdomen] : abdomen [Post Prandial] : post prandial

## 2021-10-29 ENCOUNTER — RESULT REVIEW (OUTPATIENT)
Age: 46
End: 2021-10-29

## 2021-10-29 ENCOUNTER — APPOINTMENT (OUTPATIENT)
Dept: INFUSION THERAPY | Facility: CLINIC | Age: 46
End: 2021-10-29

## 2021-10-29 VITALS
BODY MASS INDEX: 29.77 KG/M2 | SYSTOLIC BLOOD PRESSURE: 113 MMHG | WEIGHT: 168 LBS | HEIGHT: 63 IN | DIASTOLIC BLOOD PRESSURE: 75 MMHG | TEMPERATURE: 97.7 F | HEART RATE: 70 BPM

## 2021-10-29 LAB
BASOPHILS # BLD AUTO: 0 K/UL — SIGNIFICANT CHANGE UP (ref 0–0.2)
BASOPHILS NFR BLD AUTO: 0 % — SIGNIFICANT CHANGE UP (ref 0–2)
EOSINOPHIL # BLD AUTO: 0.04 K/UL — SIGNIFICANT CHANGE UP (ref 0–0.5)
EOSINOPHIL NFR BLD AUTO: 1 % — SIGNIFICANT CHANGE UP (ref 0–6)
FULL GENE SEQUENCE RESULT: SIGNIFICANT CHANGE UP
HCT VFR BLD CALC: 37.3 % — SIGNIFICANT CHANGE UP (ref 34.5–45)
HGB BLD-MCNC: 12.8 G/DL — SIGNIFICANT CHANGE UP (ref 11.5–15.5)
HYPOCHROMIA BLD QL: SLIGHT — SIGNIFICANT CHANGE UP
LG PLATELETS BLD QL AUTO: SLIGHT — SIGNIFICANT CHANGE UP
LYMPHOCYTES # BLD AUTO: 1.48 K/UL — SIGNIFICANT CHANGE UP (ref 1–3.3)
LYMPHOCYTES # BLD AUTO: 36 % — SIGNIFICANT CHANGE UP (ref 13–44)
MCHC RBC-ENTMCNC: 29 PG — SIGNIFICANT CHANGE UP (ref 27–34)
MCHC RBC-ENTMCNC: 34.3 GM/DL — SIGNIFICANT CHANGE UP (ref 32–36)
MCV RBC AUTO: 84.6 FL — SIGNIFICANT CHANGE UP (ref 80–100)
MOL DX INTERP BLD/T QL: SIGNIFICANT CHANGE UP
MONOCYTES # BLD AUTO: 0.25 K/UL — SIGNIFICANT CHANGE UP (ref 0–0.9)
MONOCYTES NFR BLD AUTO: 6 % — SIGNIFICANT CHANGE UP (ref 2–14)
NEUTROPHILS # BLD AUTO: 2.22 K/UL — SIGNIFICANT CHANGE UP (ref 1.8–7.4)
NEUTROPHILS NFR BLD AUTO: 54 % — SIGNIFICANT CHANGE UP (ref 43–77)
NRBC # BLD: 0 /100 — SIGNIFICANT CHANGE UP (ref 0–0)
NRBC # BLD: SIGNIFICANT CHANGE UP /100 WBCS (ref 0–0)
PLAT MORPH BLD: ABNORMAL
PLATELET # BLD AUTO: 218 K/UL — SIGNIFICANT CHANGE UP (ref 150–400)
RBC # BLD: 4.41 M/UL — SIGNIFICANT CHANGE UP (ref 3.8–5.2)
RBC # FLD: 11.9 % — SIGNIFICANT CHANGE UP (ref 10.3–14.5)
RBC BLD AUTO: NORMAL — SIGNIFICANT CHANGE UP
REF LAB TEST METHOD: SIGNIFICANT CHANGE UP
REVIEWED BY: SIGNIFICANT CHANGE UP
TA REPEAT RESULT: SIGNIFICANT CHANGE UP
TEST PERFORMANCE INFO SPEC: SIGNIFICANT CHANGE UP
UGT1A1 GENE MUT ANL BLD/T: SIGNIFICANT CHANGE UP
VARIANT LYMPHS # BLD: 3 % — SIGNIFICANT CHANGE UP (ref 0–6)
WBC # BLD: 4.11 K/UL — SIGNIFICANT CHANGE UP (ref 3.8–10.5)
WBC # FLD AUTO: 4.11 K/UL — SIGNIFICANT CHANGE UP (ref 3.8–10.5)

## 2021-10-30 LAB — MAGNESIUM SERPL-MCNC: 2 MG/DL — SIGNIFICANT CHANGE UP (ref 1.6–2.6)

## 2021-11-02 ENCOUNTER — RESULT REVIEW (OUTPATIENT)
Age: 46
End: 2021-11-02

## 2021-11-02 ENCOUNTER — APPOINTMENT (OUTPATIENT)
Dept: INFUSION THERAPY | Facility: CLINIC | Age: 46
End: 2021-11-02
Payer: COMMERCIAL

## 2021-11-02 ENCOUNTER — APPOINTMENT (OUTPATIENT)
Dept: HEMATOLOGY ONCOLOGY | Facility: CLINIC | Age: 46
End: 2021-11-02
Payer: COMMERCIAL

## 2021-11-02 VITALS
SYSTOLIC BLOOD PRESSURE: 102 MMHG | BODY MASS INDEX: 30.48 KG/M2 | DIASTOLIC BLOOD PRESSURE: 67 MMHG | HEART RATE: 85 BPM | TEMPERATURE: 98 F | HEIGHT: 63 IN | WEIGHT: 172 LBS

## 2021-11-02 LAB
ANISOCYTOSIS BLD QL: SLIGHT — SIGNIFICANT CHANGE UP
BASOPHILS # BLD AUTO: 0.03 K/UL — SIGNIFICANT CHANGE UP (ref 0–0.2)
BASOPHILS NFR BLD AUTO: 1 % — SIGNIFICANT CHANGE UP (ref 0–2)
EOSINOPHIL # BLD AUTO: 0.27 K/UL — SIGNIFICANT CHANGE UP (ref 0–0.5)
EOSINOPHIL NFR BLD AUTO: 8 % — HIGH (ref 0–6)
GIANT PLATELETS BLD QL SMEAR: PRESENT — SIGNIFICANT CHANGE UP
HCT VFR BLD CALC: 34.9 % — SIGNIFICANT CHANGE UP (ref 34.5–45)
HGB BLD-MCNC: 11.4 G/DL — LOW (ref 11.5–15.5)
LG PLATELETS BLD QL AUTO: SLIGHT — SIGNIFICANT CHANGE UP
LYMPHOCYTES # BLD AUTO: 1.03 K/UL — SIGNIFICANT CHANGE UP (ref 1–3.3)
LYMPHOCYTES # BLD AUTO: 31 % — SIGNIFICANT CHANGE UP (ref 13–44)
MCHC RBC-ENTMCNC: 28.5 PG — SIGNIFICANT CHANGE UP (ref 27–34)
MCHC RBC-ENTMCNC: 32.7 GM/DL — SIGNIFICANT CHANGE UP (ref 32–36)
MCV RBC AUTO: 87.3 FL — SIGNIFICANT CHANGE UP (ref 80–100)
MONOCYTES # BLD AUTO: 0.86 K/UL — SIGNIFICANT CHANGE UP (ref 0–0.9)
MONOCYTES NFR BLD AUTO: 26 % — HIGH (ref 2–14)
NEUTROPHILS # BLD AUTO: 1.03 K/UL — LOW (ref 1.8–7.4)
NEUTROPHILS NFR BLD AUTO: 27 % — LOW (ref 43–77)
NEUTS BAND # BLD: 4 % — SIGNIFICANT CHANGE UP (ref 0–8)
NRBC # BLD: 0 /100 — SIGNIFICANT CHANGE UP (ref 0–0)
NRBC # BLD: SIGNIFICANT CHANGE UP /100 WBCS (ref 0–0)
PLAT MORPH BLD: NORMAL — SIGNIFICANT CHANGE UP
PLATELET # BLD AUTO: 191 K/UL — SIGNIFICANT CHANGE UP (ref 150–400)
POLYCHROMASIA BLD QL SMEAR: SLIGHT — SIGNIFICANT CHANGE UP
RBC # BLD: 4 M/UL — SIGNIFICANT CHANGE UP (ref 3.8–5.2)
RBC # FLD: 12.3 % — SIGNIFICANT CHANGE UP (ref 10.3–14.5)
RBC BLD AUTO: NORMAL — SIGNIFICANT CHANGE UP
VARIANT LYMPHS # BLD: 3 % — SIGNIFICANT CHANGE UP (ref 0–6)
WBC # BLD: 3.32 K/UL — LOW (ref 3.8–10.5)
WBC # FLD AUTO: 3.32 K/UL — LOW (ref 3.8–10.5)

## 2021-11-02 PROCEDURE — 99214 OFFICE O/P EST MOD 30 MIN: CPT

## 2021-11-02 NOTE — ASSESSMENT
[FreeTextEntry1] : 44 y/o female with clinical stage III I pancreatic adenocarcinoma.\par 10/19/21- Started on NEOAdjuvant FOLFIRINOX.\par Today cycle 2 of Folfirinox.\par reviewed management of chemotx related n/v. She will continue zofran, compazine, olanzapine and dex- helping.\par Will give iv hydration on day 3,4 and  7. \par At risk for chemo induced neutropenia- will add pegfilgrastim. \par Discussed management of chemo related diarrhea.\par \par Pain management- on Fentanyl patch 50 mcg/h and Dilaudid PRN- helping. Evaluated for celiac block by IR- she wants to hold off for now. \par \par Seen by cancer genetics team- [facility] panel pending.\par \par Exam in one week.  Scans after cycle 4. \par

## 2021-11-02 NOTE — REASON FOR VISIT
[Follow-Up Visit] : a follow-up [Other: _____] : [unfilled] [FreeTextEntry2] : Pancreatic Cancer on neoadjuvant  FOLFIRINOX

## 2021-11-02 NOTE — HISTORY OF PRESENT ILLNESS
[Disease: _____________________] : Disease: [unfilled] [T: ___] : T[unfilled] [N: ___] : N[unfilled] [AJCC Stage: ____] : AJCC Stage: [unfilled] [de-identified] : BOUCHRA PALACIOS is a 45 y.o. with a PMH significant for hypothyroidism, in Oct 2021 diagnosed with  a clinical stage III pancreatic cancer.\par \par ~8/2021 - Presented at age 45 with abdominal pains.  Saw PCP, referred to GI Dr. Maldonado. \par 9/30/21 - Abd US - 3.9 x 4.6 x 4.8cm heterogeneous mass in region of pancreatic tail. \par 10/7/21 - CT A/P - pancreatic tail mass with splenic artery encasement and splenic vein thrombosis ( tumor thrombus).  Borderline PALN's. \par 10/11/21 - 10/15/21 - Admit to  for severe pain.  Was set up for outpatient EGD with biopsy but had severe pains, nausea, dry-heaving and so went to ER. \par 10/11/21 - CT Chest - no evidence of disease.\par 10/11/21 - MRCP - tumor encases splenic artery, celiac artery contact <180, splenic vein occlusion.  Possible retroperitoneal extension with left adrenal gland contact and possible extension into left celiac ganglion.\par 10/12/21 - EUS, FNA pancreatic mass - scanty specimen - PATH - poorly differentiated carcinoma. \par 10/14/21 - Celiac block attempted - not successful - no window due to tumor overlying plexus. \par Case presented at tumor board - for NEOAdjuvant FOLFIRINOX up to 12 cycles, reassess after 4.  Consider chemoRT if suboptimal response. \par Dr. Howell - surgeon. 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 Partially sx related to narcotics withdrawal ( she missed her pain meds). Much better now.\par \par Nausea better- on zofran, compazine, olanzapine and dex.\par Diarrhea- manageable with Imodium- improved. No mouth sores.\par Pain management: Fentanyl patch increased to 50 mcg.h- helping, also takes Dilaudid 2-4 mg prn.\par

## 2021-11-02 NOTE — PHYSICAL EXAM
[Normal] : well developed, well nourished, in no acute distress [de-identified] : slightly fatigued  [de-identified] : no mucositis  PAIN

## 2021-11-03 ENCOUNTER — NON-APPOINTMENT (OUTPATIENT)
Age: 46
End: 2021-11-03

## 2021-11-03 LAB
ALBUMIN SERPL ELPH-MCNC: 3.4 G/DL — SIGNIFICANT CHANGE UP (ref 3.3–5)
ALP SERPL-CCNC: 88 U/L — SIGNIFICANT CHANGE UP (ref 40–120)
ALT FLD-CCNC: 41 U/L — SIGNIFICANT CHANGE UP (ref 10–45)
ANION GAP SERPL CALC-SCNC: 16 MMOL/L — SIGNIFICANT CHANGE UP (ref 5–17)
AST SERPL-CCNC: 38 U/L — SIGNIFICANT CHANGE UP (ref 10–40)
BILIRUB SERPL-MCNC: 0.4 MG/DL — SIGNIFICANT CHANGE UP (ref 0.2–1.2)
BUN SERPL-MCNC: 6 MG/DL — LOW (ref 7–23)
CALCIUM SERPL-MCNC: 8.8 MG/DL — SIGNIFICANT CHANGE UP (ref 8.4–10.5)
CHLORIDE SERPL-SCNC: 95 MMOL/L — LOW (ref 96–108)
CO2 SERPL-SCNC: 26 MMOL/L — SIGNIFICANT CHANGE UP (ref 22–31)
CREAT SERPL-MCNC: 0.73 MG/DL — SIGNIFICANT CHANGE UP (ref 0.5–1.3)
GLUCOSE SERPL-MCNC: 92 MG/DL — SIGNIFICANT CHANGE UP (ref 70–99)
POTASSIUM SERPL-MCNC: 3.4 MMOL/L — LOW (ref 3.5–5.3)
POTASSIUM SERPL-SCNC: 3.4 MMOL/L — LOW (ref 3.5–5.3)
PROT SERPL-MCNC: 5.9 G/DL — LOW (ref 6–8.3)
SODIUM SERPL-SCNC: 137 MMOL/L — SIGNIFICANT CHANGE UP (ref 135–145)

## 2021-11-04 ENCOUNTER — APPOINTMENT (OUTPATIENT)
Dept: INFUSION THERAPY | Facility: CLINIC | Age: 46
End: 2021-11-04

## 2021-11-04 DIAGNOSIS — G89.3 NEOPLASM RELATED PAIN (ACUTE) (CHRONIC): ICD-10-CM

## 2021-11-04 DIAGNOSIS — Z79.899 OTHER LONG TERM (CURRENT) DRUG THERAPY: ICD-10-CM

## 2021-11-05 ENCOUNTER — APPOINTMENT (OUTPATIENT)
Dept: INFUSION THERAPY | Facility: CLINIC | Age: 46
End: 2021-11-05

## 2021-11-08 ENCOUNTER — APPOINTMENT (OUTPATIENT)
Dept: INFUSION THERAPY | Facility: CLINIC | Age: 46
End: 2021-11-08

## 2021-11-08 DIAGNOSIS — Z51.89 ENCOUNTER FOR OTHER SPECIFIED AFTERCARE: ICD-10-CM

## 2021-11-10 ENCOUNTER — RESULT REVIEW (OUTPATIENT)
Age: 46
End: 2021-11-10

## 2021-11-10 ENCOUNTER — APPOINTMENT (OUTPATIENT)
Dept: HEMATOLOGY ONCOLOGY | Facility: CLINIC | Age: 46
End: 2021-11-10
Payer: COMMERCIAL

## 2021-11-10 VITALS
BODY MASS INDEX: 29.06 KG/M2 | HEART RATE: 62 BPM | WEIGHT: 164 LBS | SYSTOLIC BLOOD PRESSURE: 106 MMHG | DIASTOLIC BLOOD PRESSURE: 74 MMHG | HEIGHT: 63.01 IN | TEMPERATURE: 98.1 F

## 2021-11-10 LAB
BASOPHILS # BLD AUTO: 0.03 K/UL — SIGNIFICANT CHANGE UP (ref 0–0.2)
BASOPHILS NFR BLD AUTO: 1 % — SIGNIFICANT CHANGE UP (ref 0–2)
DOHLE BOD BLD QL SMEAR: PRESENT — SIGNIFICANT CHANGE UP
EOSINOPHIL # BLD AUTO: 0 K/UL — SIGNIFICANT CHANGE UP (ref 0–0.5)
EOSINOPHIL NFR BLD AUTO: 0 % — SIGNIFICANT CHANGE UP (ref 0–6)
HCT VFR BLD CALC: 38.9 % — SIGNIFICANT CHANGE UP (ref 34.5–45)
HGB BLD-MCNC: 12.8 G/DL — SIGNIFICANT CHANGE UP (ref 11.5–15.5)
LG PLATELETS BLD QL AUTO: SLIGHT — SIGNIFICANT CHANGE UP
LYMPHOCYTES # BLD AUTO: 2.05 K/UL — SIGNIFICANT CHANGE UP (ref 1–3.3)
LYMPHOCYTES # BLD AUTO: 60 % — HIGH (ref 13–44)
MCHC RBC-ENTMCNC: 28.6 PG — SIGNIFICANT CHANGE UP (ref 27–34)
MCHC RBC-ENTMCNC: 32.9 GM/DL — SIGNIFICANT CHANGE UP (ref 32–36)
MCV RBC AUTO: 87 FL — SIGNIFICANT CHANGE UP (ref 80–100)
MONOCYTES # BLD AUTO: 0.75 K/UL — SIGNIFICANT CHANGE UP (ref 0–0.9)
MONOCYTES NFR BLD AUTO: 22 % — HIGH (ref 2–14)
NEUTROPHILS # BLD AUTO: 0.51 K/UL — LOW (ref 1.8–7.4)
NEUTROPHILS NFR BLD AUTO: 15 % — LOW (ref 43–77)
NRBC # BLD: 0 /100 — SIGNIFICANT CHANGE UP (ref 0–0)
NRBC # BLD: SIGNIFICANT CHANGE UP /100 WBCS (ref 0–0)
PLAT MORPH BLD: ABNORMAL
PLATELET # BLD AUTO: 85 K/UL — LOW (ref 150–400)
RBC # BLD: 4.47 M/UL — SIGNIFICANT CHANGE UP (ref 3.8–5.2)
RBC # FLD: 12.5 % — SIGNIFICANT CHANGE UP (ref 10.3–14.5)
RBC BLD AUTO: NORMAL — SIGNIFICANT CHANGE UP
VARIANT LYMPHS # BLD: 2 % — SIGNIFICANT CHANGE UP (ref 0–6)
WBC # BLD: 3.42 K/UL — LOW (ref 3.8–10.5)
WBC # FLD AUTO: 3.42 K/UL — LOW (ref 3.8–10.5)

## 2021-11-10 PROCEDURE — 99214 OFFICE O/P EST MOD 30 MIN: CPT

## 2021-11-11 DIAGNOSIS — R43.2 PARAGEUSIA: ICD-10-CM

## 2021-11-11 DIAGNOSIS — Z15.89 GENETIC SUSCEPTIBILITY TO OTHER DISEASE: ICD-10-CM

## 2021-11-11 DIAGNOSIS — D70.1 AGRANULOCYTOSIS SECONDARY TO CANCER CHEMOTHERAPY: ICD-10-CM

## 2021-11-11 LAB
ALBUMIN SERPL ELPH-MCNC: 3.6 G/DL — SIGNIFICANT CHANGE UP (ref 3.3–5)
ALP SERPL-CCNC: 116 U/L — SIGNIFICANT CHANGE UP (ref 40–120)
ALT FLD-CCNC: 98 U/L — HIGH (ref 10–45)
ANION GAP SERPL CALC-SCNC: 14 MMOL/L — SIGNIFICANT CHANGE UP (ref 5–17)
AST SERPL-CCNC: 49 U/L — HIGH (ref 10–40)
BILIRUB SERPL-MCNC: 0.3 MG/DL — SIGNIFICANT CHANGE UP (ref 0.2–1.2)
BUN SERPL-MCNC: 9 MG/DL — SIGNIFICANT CHANGE UP (ref 7–23)
CALCIUM SERPL-MCNC: 9.4 MG/DL — SIGNIFICANT CHANGE UP (ref 8.4–10.5)
CHLORIDE SERPL-SCNC: 98 MMOL/L — SIGNIFICANT CHANGE UP (ref 96–108)
CO2 SERPL-SCNC: 26 MMOL/L — SIGNIFICANT CHANGE UP (ref 22–31)
CREAT SERPL-MCNC: 0.77 MG/DL — SIGNIFICANT CHANGE UP (ref 0.5–1.3)
GLUCOSE SERPL-MCNC: 90 MG/DL — SIGNIFICANT CHANGE UP (ref 70–99)
POTASSIUM SERPL-MCNC: 4.2 MMOL/L — SIGNIFICANT CHANGE UP (ref 3.5–5.3)
POTASSIUM SERPL-SCNC: 4.2 MMOL/L — SIGNIFICANT CHANGE UP (ref 3.5–5.3)
PROT SERPL-MCNC: 6.5 G/DL — SIGNIFICANT CHANGE UP (ref 6–8.3)
SODIUM SERPL-SCNC: 139 MMOL/L — SIGNIFICANT CHANGE UP (ref 135–145)

## 2021-11-11 NOTE — HISTORY OF PRESENT ILLNESS
[Disease: _____________________] : Disease: [unfilled] [T: ___] : T[unfilled] [N: ___] : N[unfilled] [AJCC Stage: ____] : AJCC Stage: [unfilled] [10 - Extreme Distress] : Distress Level: 10 [Referred Patient  to social work for follow-up] : Patient was referred to social work for follow-up [de-identified] : BOUCHRA PALACIOS is a 45 y.o. with a PMH significant for hypothyroidism, who we are following for a BRCA2+, clinical stage III pancreatic tail cancer.\par \par ~8/2021 - Presented at age 45 with abdominal pains.  Saw PCP, referred to GI Dr. Maldonado. \par 9/30/21 - Abd US - 3.9 x 4.6 x 4.8cm heterogeneous mass in region of pancreatic tail. \par 10/7/21 - CT A/P - pancreatic tail mass with splenic artery encasement and splenic vein thrombosis.  Borderline PALN's. \par 10/11/21 - 10/15/21 - Admit to  for severe pain.  Was set up for outpatient EGD with biopsy but had severe pains, nausea, dry-heaving and so went to ER. \par 10/11/21 - CT Chest - no evidence of disease.\par 10/11/21 - MRCP - tumor encases splenic artery, celiac artery contact <180, splenic vein occlusion.  Possible retroperitoneal extension with left adrenal gland contact and possible extension into left celiac ganglion.\par 10/12/21 - EUS, FNA pancreatic mass - scanty specimen - PATH - poorly differentiated carcinoma. \par 10/14/21 - Celiac block attempted - not successful - no window due to tumor overlying plexus. \par Case presented at tumor board - for NEOAdjuvant FOLFIRINOX up to 12 cycles, reassess after 4.  Consider chemoRT if suboptimal response. 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 \par Dr. Howell - surgeon. \par 10/19/21 - Started FOLFIRINOX [de-identified] :  poorly differentiated carcinoma [de-identified] : 10/18/21 - [facility] Comprehensive genetic panel - BRCA2 +\par 10/22/21 - UGT1A1 - pending [de-identified] : Pain regime much better with increased patch - using perhaps (2) 4mg Dilaudid tabs for breakthru in a day. \par Major issue is N/V/D. \par The addition of Olanzapine and Dex helped a bit  -  this cycle was much better than C1 - but it was still really bad. \par Did OK until D5 (Sat) -  then N/V/D all hit at once.  \par Chemo was Tues - Had Aloxi, Emend IV, Dec.  Took Olanzapine Mon nite and x 3 days after.  Took Dec PO x 3 days after.  Was also taking Zofran and Compazine PRN.\lakhwinder Gets hydration on D3 disconnect and on D4 (Friday).\par On Saturday began with diarrhea - but initially the stool was hard so this hurt.  Was taking Imodium but then was afraid so was taking Miralax as well!. \lakhwinder Had vomiting but only Saturday.  \lakhwinder Started to feel better today.  Lost ~ 8 more pounds.  \lakhwinder Likes [employer] and cheese microwavable bowls.   \lakhwinder Had mild cold neuropathies only - resolved.   \lakhwinder Has some pains in hips and lower back that started this morning.  \lakhwinder Denies any other changes in her family, medical, or social history since her last visit of 11/2/21.

## 2021-11-11 NOTE — REASON FOR VISIT
[Follow-Up Visit] : a follow-up [Pre-Treatment Visit] : a pre-treatment [FreeTextEntry2] : Pancreatic Cancer - D9 C2 NEOAdjuvant FOLFIRINOX

## 2021-11-11 NOTE — ASSESSMENT
[FreeTextEntry1] : Patient is a 45 y.o. with a clinical stage III pancreatic tail cancer (T4).\par 10/19/21- Started on NEOAdjuvant FOLFIRINOX.\par Today D9 C2.  Patient has been having a hard time with N/V/D. \par \par Plan:\par 1. N/V -  We had added Zyprexa and Dec - Patient seems to do well until Day 5 - so has more of an issue with delayed emesis.  Already gets hydration on D3 disconnect and on D4 (Friday).  Will see if we can add another Aloxi on Friday.  Was also told to move PO Decadron to D4,5,6.  \par 2. Taste changes/ Weight loss - Patient asked to speak with dietician about food options. Referral made. \par 3. Diarrhea - Should not take Imodium and Miralax at the same time.  Recommended fiber instead such as Metamucil and stool softeners.  Reviewed that the Antiemetics on D1 can be constipating so she will need an early intervention.\par 4. Pain - Now comfortable on current regime. \par 5. Neutropenia - Patient with UGT1A1 mutation - s/p Neulasta.  Now with bone pains.  Counts should some up shortly. reviewed neutropenic precautions. \par 6. Onc - Continue for up to 12 cycles.  Restage after 4th cycle.

## 2021-11-11 NOTE — REVIEW OF SYSTEMS
[Patient Intake Form Reviewed] : Patient intake form was reviewed [Recent Change In Weight] : ~T recent weight change [Abdominal Pain] : abdominal pain [Vomiting] : vomiting [Diarrhea] : diarrhea [Negative] : Allergic/Immunologic [Fatigue] : fatigue [Constipation] : constipation [Hot Flashes] : hot flashes [Easy Bruising] : a tendency for easy bruising [FreeTextEntry4] : no taste.  Throat a bit scratchy [FreeTextEntry2] : lost 8 more pounds; Has cancer pain but under good control now.  [FreeTextEntry7] : Loss of appetite, taste changes.  Abd pain = LUQ cancer pain.

## 2021-11-11 NOTE — PHYSICAL EXAM
[Normal] : affect appropriate [de-identified] : Young woman  [de-identified] : anicteric [de-identified] : no rashes [de-identified] : No C/C/E, right port without s/s infection

## 2021-11-15 ENCOUNTER — OUTPATIENT (OUTPATIENT)
Dept: OUTPATIENT SERVICES | Facility: HOSPITAL | Age: 46
LOS: 1 days | Discharge: HOME | End: 2021-11-15

## 2021-11-15 DIAGNOSIS — C25.9 MALIGNANT NEOPLASM OF PANCREAS, UNSPECIFIED: ICD-10-CM

## 2021-11-15 DIAGNOSIS — Z78.9 OTHER SPECIFIED HEALTH STATUS: Chronic | ICD-10-CM

## 2021-11-16 ENCOUNTER — RESULT REVIEW (OUTPATIENT)
Age: 46
End: 2021-11-16

## 2021-11-16 ENCOUNTER — APPOINTMENT (OUTPATIENT)
Dept: INFUSION THERAPY | Facility: CLINIC | Age: 46
End: 2021-11-16

## 2021-11-16 VITALS
RESPIRATION RATE: 17 BRPM | SYSTOLIC BLOOD PRESSURE: 110 MMHG | OXYGEN SATURATION: 98 % | HEIGHT: 64 IN | HEART RATE: 108 BPM | WEIGHT: 162.2 LBS | BODY MASS INDEX: 27.69 KG/M2 | DIASTOLIC BLOOD PRESSURE: 71 MMHG | TEMPERATURE: 98.7 F

## 2021-11-16 LAB
BASOPHILS # BLD AUTO: 0 K/UL — SIGNIFICANT CHANGE UP (ref 0–0.2)
BASOPHILS NFR BLD AUTO: 0 % — SIGNIFICANT CHANGE UP (ref 0–2)
EOSINOPHIL # BLD AUTO: 0.24 K/UL — SIGNIFICANT CHANGE UP (ref 0–0.5)
EOSINOPHIL NFR BLD AUTO: 1 % — SIGNIFICANT CHANGE UP (ref 0–6)
HCT VFR BLD CALC: 36.8 % — SIGNIFICANT CHANGE UP (ref 34.5–45)
HGB BLD-MCNC: 12.4 G/DL — SIGNIFICANT CHANGE UP (ref 11.5–15.5)
LG PLATELETS BLD QL AUTO: SIGNIFICANT CHANGE UP
LYMPHOCYTES # BLD AUTO: 18 % — SIGNIFICANT CHANGE UP (ref 13–44)
LYMPHOCYTES # BLD AUTO: 4.28 K/UL — HIGH (ref 1–3.3)
MACROCYTES BLD QL: SLIGHT — SIGNIFICANT CHANGE UP
MCHC RBC-ENTMCNC: 28.8 PG — SIGNIFICANT CHANGE UP (ref 27–34)
MCHC RBC-ENTMCNC: 33.7 GM/DL — SIGNIFICANT CHANGE UP (ref 32–36)
MCV RBC AUTO: 85.4 FL — SIGNIFICANT CHANGE UP (ref 80–100)
METAMYELOCYTES # FLD: 3 % — HIGH (ref 0–0)
MONOCYTES # BLD AUTO: 2.85 K/UL — HIGH (ref 0–0.9)
MONOCYTES NFR BLD AUTO: 12 % — SIGNIFICANT CHANGE UP (ref 2–14)
MYELOCYTES NFR BLD: 2 % — HIGH (ref 0–0)
NEUTROPHILS # BLD AUTO: 14.99 K/UL — HIGH (ref 1.8–7.4)
NEUTROPHILS NFR BLD AUTO: 62 % — SIGNIFICANT CHANGE UP (ref 43–77)
NEUTS BAND # BLD: 1 % — SIGNIFICANT CHANGE UP (ref 0–8)
NRBC # BLD: 0 /100 — SIGNIFICANT CHANGE UP (ref 0–0)
NRBC # BLD: SIGNIFICANT CHANGE UP /100 WBCS (ref 0–0)
PLAT MORPH BLD: ABNORMAL
PLATELET # BLD AUTO: 208 K/UL — SIGNIFICANT CHANGE UP (ref 150–400)
POLYCHROMASIA BLD QL SMEAR: SLIGHT — SIGNIFICANT CHANGE UP
PROMYELOCYTES # FLD: 1 % — HIGH (ref 0–0)
RBC # BLD: 4.31 M/UL — SIGNIFICANT CHANGE UP (ref 3.8–5.2)
RBC # FLD: 13.2 % — SIGNIFICANT CHANGE UP (ref 10.3–14.5)
RBC BLD AUTO: SIGNIFICANT CHANGE UP
TOXIC GRANULES BLD QL SMEAR: PRESENT — SIGNIFICANT CHANGE UP
WBC # BLD: 23.79 K/UL — HIGH (ref 3.8–10.5)
WBC # FLD AUTO: 23.79 K/UL — HIGH (ref 3.8–10.5)

## 2021-11-17 DIAGNOSIS — Z51.11 ENCOUNTER FOR ANTINEOPLASTIC CHEMOTHERAPY: ICD-10-CM

## 2021-11-17 DIAGNOSIS — R11.2 NAUSEA WITH VOMITING, UNSPECIFIED: ICD-10-CM

## 2021-11-17 LAB
ALBUMIN SERPL ELPH-MCNC: 3.7 G/DL — SIGNIFICANT CHANGE UP (ref 3.3–5)
ALP SERPL-CCNC: 170 U/L — HIGH (ref 40–120)
ALT FLD-CCNC: 70 U/L — HIGH (ref 10–45)
ANION GAP SERPL CALC-SCNC: 16 MMOL/L — SIGNIFICANT CHANGE UP (ref 5–17)
AST SERPL-CCNC: 64 U/L — HIGH (ref 10–40)
BILIRUB SERPL-MCNC: 0.4 MG/DL — SIGNIFICANT CHANGE UP (ref 0.2–1.2)
BUN SERPL-MCNC: 7 MG/DL — SIGNIFICANT CHANGE UP (ref 7–23)
CALCIUM SERPL-MCNC: 9 MG/DL — SIGNIFICANT CHANGE UP (ref 8.4–10.5)
CANCER AG19-9 SERPL-ACNC: 42 U/ML — HIGH
CHLORIDE SERPL-SCNC: 93 MMOL/L — LOW (ref 96–108)
CO2 SERPL-SCNC: 24 MMOL/L — SIGNIFICANT CHANGE UP (ref 22–31)
CREAT SERPL-MCNC: 0.7 MG/DL — SIGNIFICANT CHANGE UP (ref 0.5–1.3)
GLUCOSE SERPL-MCNC: 108 MG/DL — HIGH (ref 70–99)
POTASSIUM SERPL-MCNC: 3.6 MMOL/L — SIGNIFICANT CHANGE UP (ref 3.5–5.3)
POTASSIUM SERPL-SCNC: 3.6 MMOL/L — SIGNIFICANT CHANGE UP (ref 3.5–5.3)
PROT SERPL-MCNC: 6.9 G/DL — SIGNIFICANT CHANGE UP (ref 6–8.3)
SODIUM SERPL-SCNC: 132 MMOL/L — LOW (ref 135–145)

## 2021-11-18 ENCOUNTER — APPOINTMENT (OUTPATIENT)
Dept: INFUSION THERAPY | Facility: CLINIC | Age: 46
End: 2021-11-18

## 2021-11-18 VITALS
OXYGEN SATURATION: 96 % | HEART RATE: 80 BPM | DIASTOLIC BLOOD PRESSURE: 68 MMHG | RESPIRATION RATE: 18 BRPM | TEMPERATURE: 98.1 F | SYSTOLIC BLOOD PRESSURE: 107 MMHG

## 2021-11-19 ENCOUNTER — APPOINTMENT (OUTPATIENT)
Dept: INFUSION THERAPY | Facility: CLINIC | Age: 46
End: 2021-11-19

## 2021-11-22 ENCOUNTER — APPOINTMENT (OUTPATIENT)
Dept: INFUSION THERAPY | Facility: CLINIC | Age: 46
End: 2021-11-22

## 2021-11-22 DIAGNOSIS — E86.0 DEHYDRATION: ICD-10-CM

## 2021-11-23 ENCOUNTER — APPOINTMENT (OUTPATIENT)
Dept: GASTROENTEROLOGY | Facility: AMBULATORY MEDICAL SERVICES | Age: 46
End: 2021-11-23

## 2021-11-26 ENCOUNTER — APPOINTMENT (OUTPATIENT)
Dept: INFUSION THERAPY | Facility: CLINIC | Age: 46
End: 2021-11-26

## 2021-11-30 ENCOUNTER — RESULT REVIEW (OUTPATIENT)
Age: 46
End: 2021-11-30

## 2021-11-30 ENCOUNTER — APPOINTMENT (OUTPATIENT)
Dept: INFUSION THERAPY | Facility: CLINIC | Age: 46
End: 2021-11-30

## 2021-11-30 VITALS
TEMPERATURE: 98.2 F | OXYGEN SATURATION: 96 % | DIASTOLIC BLOOD PRESSURE: 75 MMHG | HEART RATE: 92 BPM | RESPIRATION RATE: 18 BRPM | SYSTOLIC BLOOD PRESSURE: 112 MMHG

## 2021-11-30 LAB
ALBUMIN SERPL ELPH-MCNC: 3.5 G/DL — SIGNIFICANT CHANGE UP (ref 3.3–5)
ALP SERPL-CCNC: 99 U/L — SIGNIFICANT CHANGE UP (ref 40–120)
ALT FLD-CCNC: 76 U/L — HIGH (ref 10–45)
ANION GAP SERPL CALC-SCNC: 15 MMOL/L — SIGNIFICANT CHANGE UP (ref 5–17)
ANISOCYTOSIS BLD QL: SLIGHT — SIGNIFICANT CHANGE UP
AST SERPL-CCNC: 47 U/L — HIGH (ref 10–40)
BASOPHILS # BLD AUTO: 0 K/UL — SIGNIFICANT CHANGE UP (ref 0–0.2)
BASOPHILS NFR BLD AUTO: 0 % — SIGNIFICANT CHANGE UP (ref 0–2)
BILIRUB SERPL-MCNC: 0.3 MG/DL — SIGNIFICANT CHANGE UP (ref 0.2–1.2)
BUN SERPL-MCNC: 6 MG/DL — LOW (ref 7–23)
CALCIUM SERPL-MCNC: 9.3 MG/DL — SIGNIFICANT CHANGE UP (ref 8.4–10.5)
CANCER AG19-9 SERPL-ACNC: 28 U/ML — SIGNIFICANT CHANGE UP
CHLORIDE SERPL-SCNC: 99 MMOL/L — SIGNIFICANT CHANGE UP (ref 96–108)
CO2 SERPL-SCNC: 25 MMOL/L — SIGNIFICANT CHANGE UP (ref 22–31)
CREAT SERPL-MCNC: 0.66 MG/DL — SIGNIFICANT CHANGE UP (ref 0.5–1.3)
EOSINOPHIL # BLD AUTO: 0.22 K/UL — SIGNIFICANT CHANGE UP (ref 0–0.5)
EOSINOPHIL NFR BLD AUTO: 6 % — SIGNIFICANT CHANGE UP (ref 0–6)
GIANT PLATELETS BLD QL SMEAR: PRESENT — SIGNIFICANT CHANGE UP
GLUCOSE SERPL-MCNC: 83 MG/DL — SIGNIFICANT CHANGE UP (ref 70–99)
HCT VFR BLD CALC: 34 % — LOW (ref 34.5–45)
HGB BLD-MCNC: 11.1 G/DL — LOW (ref 11.5–15.5)
LG PLATELETS BLD QL AUTO: SIGNIFICANT CHANGE UP
LYMPHOCYTES # BLD AUTO: 2.32 K/UL — SIGNIFICANT CHANGE UP (ref 1–3.3)
LYMPHOCYTES # BLD AUTO: 62 % — HIGH (ref 13–44)
MACROCYTES BLD QL: SLIGHT — SIGNIFICANT CHANGE UP
MAGNESIUM SERPL-MCNC: 1.8 MG/DL — SIGNIFICANT CHANGE UP (ref 1.6–2.6)
MCHC RBC-ENTMCNC: 28.2 PG — SIGNIFICANT CHANGE UP (ref 27–34)
MCHC RBC-ENTMCNC: 32.6 GM/DL — SIGNIFICANT CHANGE UP (ref 32–36)
MCV RBC AUTO: 86.3 FL — SIGNIFICANT CHANGE UP (ref 80–100)
MICROCYTES BLD QL: SLIGHT — SIGNIFICANT CHANGE UP
MONOCYTES # BLD AUTO: 0.52 K/UL — SIGNIFICANT CHANGE UP (ref 0–0.9)
MONOCYTES NFR BLD AUTO: 14 % — SIGNIFICANT CHANGE UP (ref 2–14)
MYELOCYTES NFR BLD: 2 % — HIGH (ref 0–0)
NEUTROPHILS # BLD AUTO: 0.49 K/UL — LOW (ref 1.8–7.4)
NEUTROPHILS NFR BLD AUTO: 13 % — LOW (ref 43–77)
NRBC # BLD: 0 /100 — SIGNIFICANT CHANGE UP (ref 0–0)
NRBC # BLD: SIGNIFICANT CHANGE UP /100 WBCS (ref 0–0)
PLAT MORPH BLD: ABNORMAL
PLATELET # BLD AUTO: 349 K/UL — SIGNIFICANT CHANGE UP (ref 150–400)
POLYCHROMASIA BLD QL SMEAR: SLIGHT — SIGNIFICANT CHANGE UP
POTASSIUM SERPL-MCNC: 4.1 MMOL/L — SIGNIFICANT CHANGE UP (ref 3.5–5.3)
POTASSIUM SERPL-SCNC: 4.1 MMOL/L — SIGNIFICANT CHANGE UP (ref 3.5–5.3)
PROT SERPL-MCNC: 6.3 G/DL — SIGNIFICANT CHANGE UP (ref 6–8.3)
RBC # BLD: 3.94 M/UL — SIGNIFICANT CHANGE UP (ref 3.8–5.2)
RBC # FLD: 14.2 % — SIGNIFICANT CHANGE UP (ref 10.3–14.5)
RBC BLD AUTO: SIGNIFICANT CHANGE UP
SODIUM SERPL-SCNC: 139 MMOL/L — SIGNIFICANT CHANGE UP (ref 135–145)
TOXIC GRANULES BLD QL SMEAR: PRESENT — SIGNIFICANT CHANGE UP
VARIANT LYMPHS # BLD: 3 % — SIGNIFICANT CHANGE UP (ref 0–6)
WBC # BLD: 3.74 K/UL — LOW (ref 3.8–10.5)
WBC # FLD AUTO: 3.74 K/UL — LOW (ref 3.8–10.5)

## 2021-12-07 ENCOUNTER — RESULT REVIEW (OUTPATIENT)
Age: 46
End: 2021-12-07

## 2021-12-07 ENCOUNTER — APPOINTMENT (OUTPATIENT)
Dept: INFUSION THERAPY | Facility: CLINIC | Age: 46
End: 2021-12-07

## 2021-12-07 LAB
BASOPHILS # BLD AUTO: 0.13 K/UL — SIGNIFICANT CHANGE UP (ref 0–0.2)
BASOPHILS NFR BLD AUTO: 1.2 % — SIGNIFICANT CHANGE UP (ref 0–2)
EOSINOPHIL # BLD AUTO: 0.55 K/UL — HIGH (ref 0–0.5)
EOSINOPHIL NFR BLD AUTO: 5.1 % — SIGNIFICANT CHANGE UP (ref 0–6)
HCT VFR BLD CALC: 37.2 % — SIGNIFICANT CHANGE UP (ref 34.5–45)
HGB BLD-MCNC: 11.8 G/DL — SIGNIFICANT CHANGE UP (ref 11.5–15.5)
IMM GRANULOCYTES NFR BLD AUTO: 0.8 % — SIGNIFICANT CHANGE UP (ref 0–1.5)
LYMPHOCYTES # BLD AUTO: 4.46 K/UL — HIGH (ref 1–3.3)
LYMPHOCYTES # BLD AUTO: 41.1 % — SIGNIFICANT CHANGE UP (ref 13–44)
MCHC RBC-ENTMCNC: 27.8 PG — SIGNIFICANT CHANGE UP (ref 27–34)
MCHC RBC-ENTMCNC: 31.7 GM/DL — LOW (ref 32–36)
MCV RBC AUTO: 87.5 FL — SIGNIFICANT CHANGE UP (ref 80–100)
MONOCYTES # BLD AUTO: 1.45 K/UL — HIGH (ref 0–0.9)
MONOCYTES NFR BLD AUTO: 13.4 % — SIGNIFICANT CHANGE UP (ref 2–14)
NEUTROPHILS # BLD AUTO: 4.16 K/UL — SIGNIFICANT CHANGE UP (ref 1.8–7.4)
NEUTROPHILS NFR BLD AUTO: 38.4 % — LOW (ref 43–77)
NRBC # BLD: 0 /100 WBCS — SIGNIFICANT CHANGE UP (ref 0–0)
PLATELET # BLD AUTO: 430 K/UL — HIGH (ref 150–400)
RBC # BLD: 4.25 M/UL — SIGNIFICANT CHANGE UP (ref 3.8–5.2)
RBC # FLD: 15.9 % — HIGH (ref 10.3–14.5)
WBC # BLD: 10.84 K/UL — HIGH (ref 3.8–10.5)
WBC # FLD AUTO: 10.84 K/UL — HIGH (ref 3.8–10.5)

## 2021-12-08 ENCOUNTER — APPOINTMENT (OUTPATIENT)
Dept: HEMATOLOGY ONCOLOGY | Facility: CLINIC | Age: 46
End: 2021-12-08
Payer: COMMERCIAL

## 2021-12-08 PROCEDURE — 99205 OFFICE O/P NEW HI 60 MIN: CPT | Mod: 95

## 2021-12-09 ENCOUNTER — APPOINTMENT (OUTPATIENT)
Dept: INFUSION THERAPY | Facility: CLINIC | Age: 46
End: 2021-12-09

## 2021-12-10 ENCOUNTER — APPOINTMENT (OUTPATIENT)
Dept: INFUSION THERAPY | Facility: CLINIC | Age: 46
End: 2021-12-10

## 2021-12-13 ENCOUNTER — APPOINTMENT (OUTPATIENT)
Dept: CT IMAGING | Facility: CLINIC | Age: 46
End: 2021-12-13
Payer: COMMERCIAL

## 2021-12-13 ENCOUNTER — OUTPATIENT (OUTPATIENT)
Dept: OUTPATIENT SERVICES | Facility: HOSPITAL | Age: 46
LOS: 1 days | End: 2021-12-13
Payer: COMMERCIAL

## 2021-12-13 DIAGNOSIS — Z51.89 ENCOUNTER FOR OTHER SPECIFIED AFTERCARE: ICD-10-CM

## 2021-12-13 DIAGNOSIS — Z78.9 OTHER SPECIFIED HEALTH STATUS: Chronic | ICD-10-CM

## 2021-12-13 DIAGNOSIS — C25.9 MALIGNANT NEOPLASM OF PANCREAS, UNSPECIFIED: ICD-10-CM

## 2021-12-13 PROCEDURE — 74177 CT ABD & PELVIS W/CONTRAST: CPT

## 2021-12-13 PROCEDURE — 71260 CT THORAX DX C+: CPT

## 2021-12-13 PROCEDURE — 71260 CT THORAX DX C+: CPT | Mod: 26

## 2021-12-13 PROCEDURE — 74177 CT ABD & PELVIS W/CONTRAST: CPT | Mod: 26

## 2021-12-16 ENCOUNTER — OUTPATIENT (OUTPATIENT)
Dept: OUTPATIENT SERVICES | Facility: HOSPITAL | Age: 46
LOS: 1 days | Discharge: HOME | End: 2021-12-16

## 2021-12-16 DIAGNOSIS — Z78.9 OTHER SPECIFIED HEALTH STATUS: Chronic | ICD-10-CM

## 2021-12-16 DIAGNOSIS — C25.9 MALIGNANT NEOPLASM OF PANCREAS, UNSPECIFIED: ICD-10-CM

## 2021-12-17 ENCOUNTER — APPOINTMENT (OUTPATIENT)
Dept: HEMATOLOGY ONCOLOGY | Facility: CLINIC | Age: 46
End: 2021-12-17
Payer: COMMERCIAL

## 2021-12-17 DIAGNOSIS — D73.5 INFARCTION OF SPLEEN: ICD-10-CM

## 2021-12-17 PROCEDURE — 99215 OFFICE O/P EST HI 40 MIN: CPT

## 2021-12-17 NOTE — ASSESSMENT
[FreeTextEntry1] : 44 y/o female BRCA 2 mutation carrier  with clinical stage III I pancreatic adenocarcinoma.\par 10/19/21- Started on NEOAdjuvant FOLFIRINOX.\par S/p cycle 4 on 12/7/21 \par Clinical response - cancer related symptoms are better.\par Radiographic response on recent CT.\par Continue current chemotherapy.  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 Discussed with Dr Howell- no indication for AC.\par \par Pain management- well controlled  on Fentanyl patch 50 mcg/h and Dilaudid PRN- helping. \par \par BRCA 2 positive- saw genetic counselor. Family tested. \par Discussed implications for future therapy.\par \par Exam with cycle 6.\par \par D/w patient and her mother.

## 2021-12-17 NOTE — REVIEW OF SYSTEMS
[Anxiety] : anxiety [Fatigue] : no fatigue [Negative] : Musculoskeletal [FreeTextEntry2] : weight stable  [FreeTextEntry7] : per HPI

## 2021-12-17 NOTE — PHYSICAL EXAM
[Restricted in physically strenuous activity but ambulatory and able to carry out work of a light or sedentary nature] : Status 1- Restricted in physically strenuous activity but ambulatory and able to carry out work of a light or sedentary nature, e.g., light house work, office work [Normal] : affect appropriate [de-identified] : looks well  weight 183 lbs  [de-identified] : no mucositis

## 2021-12-17 NOTE — HISTORY OF PRESENT ILLNESS
[Disease: _____________________] : Disease: [unfilled] [T: ___] : T[unfilled] [N: ___] : N[unfilled] [AJCC Stage: ____] : AJCC Stage: [unfilled] [de-identified] : BOUCHRA PALACIOS is a 45 y.o. with a PMH significant for hypothyroidism, in Oct 2021 diagnosed with  a clinical stage III pancreatic cancer.\par \par ~8/2021 - Presented at age 45 with abdominal pains.  Saw PCP, referred to GI Dr. Maldonado. \par 9/30/21 - Abd US - 3.9 x 4.6 x 4.8cm heterogeneous mass in region of pancreatic tail. \par 10/7/21 - CT A/P - pancreatic tail mass with splenic artery encasement and splenic vein thrombosis ( tumor thrombus).  Borderline PALN's. \par 10/11/21 - 10/15/21 - Admit to  for severe pain.  Was set up for outpatient EGD with biopsy but had severe pains, nausea, dry-heaving and so went to ER. \par 10/11/21 - CT Chest - no evidence of disease.\par 10/11/21 - MRCP - tumor encases splenic artery, celiac artery contact <180, splenic vein occlusion.  Possible retroperitoneal extension with left adrenal gland contact and possible extension into left celiac ganglion.\par 10/12/21 - EUS, FNA pancreatic mass - scanty specimen - PATH - poorly differentiated carcinoma. \par 10/14/21 - Celiac block attempted - not successful - no window due to tumor overlying plexus. \par Case presented at tumor board - for NEOAdjuvant FOLFIRINOX up to 12 cycles, reassess after 4.  Consider chemoRT if suboptimal response. \par Dr. Howell - surgeon. \par 10/19/21 - Started FOLFIRINOX\par \par CT CAP 12/13/21 post 4 cycles of Folfirinox :  pancreatic mass in smaller ( 4.5 from 5.8 cm) but persistent vascular encasement and now with complete occlusion of splenic artery and large splenic infarct) . 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 Has  nausea, fatigue and decreased po intake for few days after chemo, but now feels much better. She is able to have regular meals. No diarrhea. Pain is well controlled with Duragesic patch and she rarely has to take Dilaudid.  No neuropathy. Nausea after chemo much more manageable with current regimen.Less bone pain with lower dose of neulasta.

## 2021-12-20 DIAGNOSIS — D73.5 INFARCTION OF SPLEEN: ICD-10-CM

## 2021-12-20 DIAGNOSIS — Z79.899 OTHER LONG TERM (CURRENT) DRUG THERAPY: ICD-10-CM

## 2021-12-20 DIAGNOSIS — Z15.01 GENETIC SUSCEPTIBILITY TO MALIGNANT NEOPLASM OF BREAST: ICD-10-CM

## 2021-12-21 ENCOUNTER — RESULT REVIEW (OUTPATIENT)
Age: 46
End: 2021-12-21

## 2021-12-21 ENCOUNTER — APPOINTMENT (OUTPATIENT)
Dept: INFUSION THERAPY | Facility: CLINIC | Age: 46
End: 2021-12-21

## 2021-12-21 LAB
BASOPHILS # BLD AUTO: 0.07 K/UL — SIGNIFICANT CHANGE UP (ref 0–0.2)
BASOPHILS NFR BLD AUTO: 0.6 % — SIGNIFICANT CHANGE UP (ref 0–2)
EOSINOPHIL # BLD AUTO: 0.38 K/UL — SIGNIFICANT CHANGE UP (ref 0–0.5)
EOSINOPHIL NFR BLD AUTO: 3.2 % — SIGNIFICANT CHANGE UP (ref 0–6)
HCT VFR BLD CALC: 36.9 % — SIGNIFICANT CHANGE UP (ref 34.5–45)
HGB BLD-MCNC: 11.9 G/DL — SIGNIFICANT CHANGE UP (ref 11.5–15.5)
IMM GRANULOCYTES NFR BLD AUTO: 2.6 % — HIGH (ref 0–1.5)
LYMPHOCYTES # BLD AUTO: 1.51 K/UL — SIGNIFICANT CHANGE UP (ref 1–3.3)
LYMPHOCYTES # BLD AUTO: 12.8 % — LOW (ref 13–44)
MCHC RBC-ENTMCNC: 28.6 PG — SIGNIFICANT CHANGE UP (ref 27–34)
MCHC RBC-ENTMCNC: 32.2 GM/DL — SIGNIFICANT CHANGE UP (ref 32–36)
MCV RBC AUTO: 88.7 FL — SIGNIFICANT CHANGE UP (ref 80–100)
MONOCYTES # BLD AUTO: 1.1 K/UL — HIGH (ref 0–0.9)
MONOCYTES NFR BLD AUTO: 9.3 % — SIGNIFICANT CHANGE UP (ref 2–14)
NEUTROPHILS # BLD AUTO: 8.43 K/UL — HIGH (ref 1.8–7.4)
NEUTROPHILS NFR BLD AUTO: 71.5 % — SIGNIFICANT CHANGE UP (ref 43–77)
NRBC # BLD: 0 /100 WBCS — SIGNIFICANT CHANGE UP (ref 0–0)
PLATELET # BLD AUTO: 138 K/UL — LOW (ref 150–400)
RBC # BLD: 4.16 M/UL — SIGNIFICANT CHANGE UP (ref 3.8–5.2)
RBC # FLD: 17.7 % — HIGH (ref 10.3–14.5)
WBC # BLD: 11.8 K/UL — HIGH (ref 3.8–10.5)
WBC # FLD AUTO: 11.8 K/UL — HIGH (ref 3.8–10.5)

## 2021-12-22 DIAGNOSIS — Z51.11 ENCOUNTER FOR ANTINEOPLASTIC CHEMOTHERAPY: ICD-10-CM

## 2021-12-22 DIAGNOSIS — R11.2 NAUSEA WITH VOMITING, UNSPECIFIED: ICD-10-CM

## 2021-12-22 LAB
ALBUMIN SERPL ELPH-MCNC: 3.8 G/DL — SIGNIFICANT CHANGE UP (ref 3.3–5)
ALP SERPL-CCNC: 117 U/L — SIGNIFICANT CHANGE UP (ref 40–120)
ALT FLD-CCNC: 36 U/L — SIGNIFICANT CHANGE UP (ref 10–45)
ANION GAP SERPL CALC-SCNC: 16 MMOL/L — SIGNIFICANT CHANGE UP (ref 5–17)
AST SERPL-CCNC: 34 U/L — SIGNIFICANT CHANGE UP (ref 10–40)
BILIRUB SERPL-MCNC: 0.4 MG/DL — SIGNIFICANT CHANGE UP (ref 0.2–1.2)
BUN SERPL-MCNC: 10 MG/DL — SIGNIFICANT CHANGE UP (ref 7–23)
CALCIUM SERPL-MCNC: 8.8 MG/DL — SIGNIFICANT CHANGE UP (ref 8.4–10.5)
CHLORIDE SERPL-SCNC: 103 MMOL/L — SIGNIFICANT CHANGE UP (ref 96–108)
CO2 SERPL-SCNC: 21 MMOL/L — LOW (ref 22–31)
CREAT SERPL-MCNC: 0.66 MG/DL — SIGNIFICANT CHANGE UP (ref 0.5–1.3)
GLUCOSE SERPL-MCNC: 95 MG/DL — SIGNIFICANT CHANGE UP (ref 70–99)
POTASSIUM SERPL-MCNC: 4.1 MMOL/L — SIGNIFICANT CHANGE UP (ref 3.5–5.3)
POTASSIUM SERPL-SCNC: 4.1 MMOL/L — SIGNIFICANT CHANGE UP (ref 3.5–5.3)
PROT SERPL-MCNC: 6.2 G/DL — SIGNIFICANT CHANGE UP (ref 6–8.3)
SODIUM SERPL-SCNC: 140 MMOL/L — SIGNIFICANT CHANGE UP (ref 135–145)

## 2021-12-23 ENCOUNTER — APPOINTMENT (OUTPATIENT)
Dept: SURGICAL ONCOLOGY | Facility: CLINIC | Age: 46
End: 2021-12-23
Payer: COMMERCIAL

## 2021-12-23 ENCOUNTER — APPOINTMENT (OUTPATIENT)
Dept: INFUSION THERAPY | Facility: CLINIC | Age: 46
End: 2021-12-23

## 2021-12-23 VITALS
TEMPERATURE: 98.7 F | SYSTOLIC BLOOD PRESSURE: 114 MMHG | OXYGEN SATURATION: 96 % | HEART RATE: 65 BPM | RESPIRATION RATE: 17 BRPM | DIASTOLIC BLOOD PRESSURE: 77 MMHG

## 2021-12-23 VITALS — HEART RATE: 65 BPM | DIASTOLIC BLOOD PRESSURE: 75 MMHG | OXYGEN SATURATION: 95 % | SYSTOLIC BLOOD PRESSURE: 111 MMHG

## 2021-12-23 PROCEDURE — 99214 OFFICE O/P EST MOD 30 MIN: CPT

## 2021-12-23 NOTE — HISTORY OF PRESENT ILLNESS
[de-identified] : Ms. BOUCHRA PALACIOS  is a 46 year  old female  presenting for a post hospitalization visit. \par \par HOSPITAL COURSE @ [facility] 10/11/2021-10/15/2021: \par 45 year old female with a PMHx of hypothyroidism and depression presented to the [facility] ED on 10/11 for abdominal pain that had lasted for a month. Prior to presentation patient had been worked up by her gastroenterologist outpatient who had ordered an abdominal ultrasound. Ultrasound at that time showed a pancreatic mass. Subsequent CT abd/pelvis showed a pancreatic tail mass with splenic encasement. Patient was scheduled for a EGD with biopsy with Dr. Campbell however patient was in acute distress and presented to ED on 10/10. Gastroenterology was consulted and patient was seen by Dr. Campbell who performed a EUS FNB. 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 Hem/Onc was consulted and patient was evaluated with Dr. Mendez who did not recommend anti-coagulation because the thrombosis was a direct result of the tumor encasement. Patient was also seen by Dr. Howell Surgical Oncology who plans to resect the mass. Patient case was presented at a GI Tumour board and a 12 cycle course of Folfirinox prior to surgical resection was planned. CXR and CT Abdomen indicated no metastatic disease to the lungs or liver. During hospitalization a nerve block of the celiac plexus was attempted by [facility] but was unsuccessful due to risk of puncturing and seeding the pancreatic mass. Patient's plain was controlled via IV Dilaudid and a Fentanyl patch. Patient \par will continue to take PO Dilaudid and apply the fentanyl patch. Patient also underwent a procedure with Interventional Radiology to implant a chemotherapy port. Patient will see Dr. Mendez outpatient on 10/18 and will begin chemotherapy 10/19. Patient will continue to take all other home meds including zofran for nausea. Recommend close follow up with PCP Dr. Davis within 1 \par week of discharge. \par \par Pancreas tail biopsy 10/11/2021- PATH: Poorly differentiated carcinoma\par \par Pts paternal aunt underwent genetic testing and was found to be BRCA 2+.  Pt. then decided to pursue genetic Testing in 2021 which revealed she is a BRCA 2+ gene carrier.  She met with Dr. Vera (Genetic counselor) and will undergo more extensive genetic testing. Results pending. \par \par PMH: Depression, hypothyroidism, BRCA 2+, \par PSH:  EGD/EUS\par Social Hx: Never a smoker, social alcohol use\par Family Hx: \par \par 10/21/2021- Pt. was started on neoadjuvant chemotherapy with FOLFIRINOX on 10/19/2021.  Having nausea and diarrhea.  Also pain in back with poor pain control with narcotic medication.\par \par 21 Ca 19-9 28 U/mL\par \par CT C/A/P performed on 21 revealed Pancreatic tail mass decreased in size since 10/11/2021. Apparent new occlusion splenic artery with extensive splenic infarction. Encasement left gastric artery and celiac axis. Splenic vein occlusion.\par \par 21- Pt cont. on Folfirinox. Today she is feeling pretty well.  She has had some tough symptoms which have been managed appropriately.  She is overall holding up well.

## 2021-12-23 NOTE — ASSESSMENT
[FreeTextEntry1] : Ms. BOUCHRA PALACIOS  is a 45 year  old female with PMHx hypothyroid, depression and BRCA2+, recently diagnosed with clinical stage III pancreatic tail cancer.  Pt. was started on neoadjuvant FOLFIRINOX on 10/19/2021.  Pt. will continue for up to 12 cycles, restage after 4th cycle. \par \par CT C/A/P performed on 12/13/21 revealed Pancreatic tail mass decreased in size since 10/11/2021. Apparent new occlusion splenic artery with extensive splenic infarction. Encasement left gastric artery and celiac axis. Splenic vein occlusion.\par \par PLAN:\par 1) Cont seeing Med onc - chemotherapy\par 2) Imaging in 2 months\par 3) RTO after imaging\par 4) Will proceed to XRT if no signs of progression or metastatic disease with a plan to explore operatively 4 weeks after the completion of XRT.\par

## 2021-12-23 NOTE — CONSULT LETTER
[Dear  ___] : Dear  [unfilled], [Courtesy Letter:] : I had the pleasure of seeing your patient, [unfilled], in my office today. [Please see my note below.] : Please see my note below. [Consult Closing:] : Thank you very much for allowing me to participate in the care of this patient.  If you have any questions, please do not hesitate to contact me. [Sincerely,] : Sincerely, [FreeTextEntry3] : Juni Howell MD, MPH, FACS, FSSO\par , [facility] General Surgical Oncology Fellowship\par [facility] [place]\par Associate Professor of Surgery\par [employer] [facility]  [DrVidhi  ___] : Dr. MENDEZ [DrVidhi ___] : Dr. MENDEZ

## 2021-12-24 NOTE — ASSESSMENT
[FreeTextEntry1] : The visit was provided via telehealth using real-time 2-way audio visual technology. The patient, Ann Garcia, was located at the medical office in [place], [place] at the time of the visit. The Genetic Counselor, Moe Vera, and Genetic Counseling Intern, Eriberto Mays, were with the patient in person in [place], [place]. The Physician, Dr. Alfred Metcalf, was located at the medical office in [place], [place]. The patient, Ann Garcia, and both providers participated in the telehealth encounter. Her paternal aunt also participated. Verbal consent for telehealth services was given on 12/08/2021 by the patient, Ann Garcia.\par \par REASON FOR CONSULT\par Ann Garcia is a 46-year-old female who was seen 12/08/2021 for a discussion regarding her BRCA2 positive genetic testing results related to hereditary cancer predisposition which were disclosed 11/03/2021. She was accompanied by her aunt, Breanna.\par \par Ms. Garcia was originally seen at [facility] on 10/18/2021 for hereditary cancer predisposition risk assessment. She was recently diagnosed with pancreatic cancer at age 45. In addition, her paternal aunt (father’s maternal half-sister) pursued genetic testing using [facility] [employer]’s [facility] panel (34 genes) and was found to have a pathogenic mutation in the BRCA2 gene (c.5946delT) (07/06/2020). Ms. Garcia decided to pursue genetic testing using a pancreatic cancer panel (20 genes) offered by [facility].\par \par TEST RESULTS: BRCA2 POSITIVE (c.5946del; [id number])\par \par NO pathogenic (disease-causing) variants or additional VUSs were detected in the following genes: APC*, ROSSY*, BMPR1A, BRCA1, BRCA2, CDKN2A (p14ARF), CDKN2A ([id number]), EPCAM*, MEN1*, MLH1*, MSH2*, MSH6*, NF1*, PALB2, PMS2*, SMAD4, STK11, TP53, TSC1*, TSC2, VHL\par \par RESULTS INTERPRETATION AND ASSESSMENT:\par We informed Ms. Garcia she tested positive for a pathogenic mutation in BRCA2. This is consistent with a diagnosis of hereditary breast and ovarian cancer (HBOC) and is likely causal in her diagnosis of pancreatic cancer. HBOC is an autosomal-dominant inherited cancer predisposition syndrome. Ms. Garcia was informed that individuals with a pathogenic BRCA2 mutation have increased risks for the following:\par \par FEMALE BREAST CANCER RISK \par Lifetime risk to develop female breast cancer is estimated to be 61-77% compared to the general population risk of 12.9%.  \par \par MALE BREAST CANCER RISK \par Lifetime risk to develop male breast cancer is estimated to be up to 10% compared to the general population risk of <1%  \par  \par OVARIAN CANCER RISK \par Lifetime risk to develop ovarian cancer is estimated to be 11-25% compared to the general population risk of 1.2%. Of note, the risk for ovarian cancer by age 40 is <1% and by age 50 the risk is 1-3%.\par \par PROSTATE CANCER RISK \par Lifetime risk to develop prostate cancer is estimated to be up to 30% compared to the general population risk of 12.1% \par \par PANCREATIC CANCER RISK \par Lifetime risk to develop pancreatic cancer is estimated to be up to 7% compared to the general population risk of 1.6%.\par  \par MELANOMA RISK \par Lifetime risk to develop melanoma is estimated to be up to 5% compared to the general population risk of 2.3%.\par \par IMPLICATIONS FOR THE PATIENT:\par Given Ms. Garcia’s personal and current reported family history of cancer, and her BRCA2 positive genetic test results, the following screening guidelines and risk-reducing recommendations were discussed:\par \par PANCREATIC:\par - Management and surveillance should be based on Ms. Garcia’s on- or post-treatment protocol as recommended by her oncologist. We briefly discussed the role of PARP inhibitors and platinum-based chemotherapy in the setting of a BRCA2 mutation. Ms. Garcia will follow-up with her Oncologist. \par \par BREAST: \par - We recommended Ms. Garcia undergo high-risk breast screening via annual breast MRIs in addition to annual mammograms. We also emphasized the importance of breast awareness.  Of note, Ms. Garcia’s last mammogram was in 2019. She was encouraged to follow-up with her care team to re-start screening within the next 2-3 months. Contact information of breast surgeons for high-risk screening was provided per patient request. \par - We also discussed the option of risk reducing bilateral mastectomy including the pros/cons. Given Ms. Garcia is currently undergoing treatment, we recommended reassessing this option once she is at least a year out of completing her current cancer treatment. \par \par OVARIAN:\par - We discussed ovarian cancer screening with transvaginal ultrasound and/or serum  testing has not been shown to reduce ovarian cancer mortality in women with BRCA2 mutations. Given this, we generally recommend consideration of risk-reducing salpingo-oophorectomy (RRSO) for ovarian cancer risk-reduction between the ages of 40-45 (or when childbearing is complete). \par - However, given Ms. Garcia is currently in treatment, we discussed screening is likely a reasonable strategy at this time. She was encouraged to meet with a GYN oncologist to further discuss this option sometime within the next few months, contact information provided. We also discussed that, similar to risk reducing mastectomy, it is likely reasonable to defer RRSO until she is at least year out of completing her current cancer treatment. \par \par MELANOMA:\par - No specific screening guidelines exist, however, general melanoma risk management is appropriate, such as a full body skin examination by a physician and minimizing UV exposure. Ms. Garcia was encouraged to see her dermatologist again annually. \par \par OTHER:\par - In the absence of other indications, Ms. Garcia should practice age-appropriate cancer screening of other organ systems as recommended for the general population.\par \par IMPLICATIONS FOR FAMILY MEMBERS: \par This mutation is inherited in an autosomal dominant pattern. We recommend the Ms. Garcia’s father’s maternal half-brother pursue genetic counseling and genetic testing as he has a 50% chance to have the same mutation. Ms. East’s aunt noted he has already pursued genetic testing using [employer] as she is his healthcare proxy and felt that was the easiest, it is also how she was originally tested. We reviewed the limitations of [employer] screening and the recommendation that this be confirmed in another lab. In addition, Ms. Garcia’s sister already pursued genetic testing and was negative for the familial BRCA2 mutation. Ms. Garcia was made aware that if any distant at-risk relatives wanted to pursue genetic testing any time in the future, we would be happy to see them and coordinate testing. If they are not local, they can locate a genetic counselor using the National Society of Genetic Counselors, Find a Genetic Counselor Tool (www.nsgc.org/findageneticcounselor). \par \par The risk of passing on this mutation to a future generation is 50%. Ms. Garcia indicated that she has no desire to have children, so further reproductive implications were not discussed for her. \par   \par REPRODUCTIVE IMPLICATIONS AND OPTIONS FOR FAMILY MEMBERS  \par Of note, individuals with biallelic mutations in BRCA2 gene have been reported to have Fanconi-Anemia (FA). FA is an autosomal recessive condition characterized by physical abnormalities (i.e. short statures, skeletal malformations), bone marrow failure and increased risk for acute myeloid leukemia and other malignancies. For those of reproductive age, we recommend the partner of an individual who is a BRCA2 carrier also have BRCA2 genetic testing to assess the risk of having a child affected with this condition if it would inform reproductive decision making. If both individuals carry a single BRCA2 mutation, there is up to a 25% chance for each pregnancy to be affected with FA. For family members with a BRCA2 mutation and of reproductive age, pre-implantation genetic diagnosis (PGD) is possible. \par  \par RESOURCES & SUPPORT GROUPS \par Facing Our Risk of cancer Empowered (FORCE): www.FacingOurRisk.org \par [facility] [place]: www.BrightPink.org \par [facility]: www.sharsheret.org \par  \par In addition, the oncology social workers at [facility] are available to assist with more referrals, if necessary.\par \par PLAN: \par 1. See above note for recommended management. \par 2. We encouraged sharing these results with family members as noted above. They have a risk to have inherited the same mutation. Other family may benefit from genetic testing and should contact a certified genetic counselor specializing in cancer. Due to HIPAA and [place] laws, Genetics is unable to directly contact other family at risk, but we are available should family members wish to reach out to us \par 3. Family support resources and referrals were provided.  \par 4. Copy of report was given during the visit. Clinic note will be mailed to Ms. Garcia.\par 5. We encouraged Ms. Garcia to return to [facility] one year after the completion of her current cancer treatment for discussion of any changes in screening recommendations or sooner if there are significant changes in personal or family history.\par  \par For any additional questions please call [facility] at [phone].  \par  \par  \par Moe Vera, MS, [facility] \par Genetic Counselor, [facility] \par \par Eriberto Mays\par Genetic Counseling Intern\par  \par \par Attending Attestation:\par \par I have reviewed and edited the genetic counselor's note and I agree with the assessment and plan as documented. I spent approximately 60 minutes in total time of which approximately 35-40 minutes was face-to-face (via 2-way audiovisual telemedicine connection) with Ms. Garcia reviewing her relevant personal and family history, the genetic testing results, our risk-assessment, and options for future cancer risk-reduction for the patient and her relevant family members. Over half this time was spent in counseling and coordination of care.\par \par Alfred Metcalf MD\par Chief, Cancer Genetics\par [facility]\par \par  \par CC:  \par Patient \par Dr. Caprcie Gibson\par

## 2021-12-27 DIAGNOSIS — E86.0 DEHYDRATION: ICD-10-CM

## 2021-12-27 DIAGNOSIS — Z51.89 ENCOUNTER FOR OTHER SPECIFIED AFTERCARE: ICD-10-CM

## 2021-12-28 ENCOUNTER — APPOINTMENT (OUTPATIENT)
Dept: INFUSION THERAPY | Facility: CLINIC | Age: 46
End: 2021-12-28

## 2021-12-29 ENCOUNTER — NON-APPOINTMENT (OUTPATIENT)
Age: 46
End: 2021-12-29

## 2021-12-29 ENCOUNTER — TRANSCRIPTION ENCOUNTER (OUTPATIENT)
Age: 46
End: 2021-12-29

## 2021-12-31 ENCOUNTER — TRANSCRIPTION ENCOUNTER (OUTPATIENT)
Age: 46
End: 2021-12-31

## 2022-01-03 ENCOUNTER — APPOINTMENT (OUTPATIENT)
Dept: INFUSION THERAPY | Facility: CLINIC | Age: 47
End: 2022-01-03

## 2022-01-03 ENCOUNTER — RESULT REVIEW (OUTPATIENT)
Age: 47
End: 2022-01-03

## 2022-01-03 LAB
BASOPHILS # BLD AUTO: 0 K/UL — SIGNIFICANT CHANGE UP (ref 0–0.2)
BASOPHILS NFR BLD AUTO: 0 % — SIGNIFICANT CHANGE UP (ref 0–2)
EOSINOPHIL # BLD AUTO: 0 K/UL — SIGNIFICANT CHANGE UP (ref 0–0.5)
EOSINOPHIL NFR BLD AUTO: 0 % — SIGNIFICANT CHANGE UP (ref 0–6)
HCT VFR BLD CALC: 37.5 % — SIGNIFICANT CHANGE UP (ref 34.5–45)
HGB BLD-MCNC: 12.2 G/DL — SIGNIFICANT CHANGE UP (ref 11.5–15.5)
LYMPHOCYTES # BLD AUTO: 17 % — SIGNIFICANT CHANGE UP (ref 13–44)
LYMPHOCYTES # BLD AUTO: 4.14 K/UL — HIGH (ref 1–3.3)
MCHC RBC-ENTMCNC: 28.3 PG — SIGNIFICANT CHANGE UP (ref 27–34)
MCHC RBC-ENTMCNC: 32.5 GM/DL — SIGNIFICANT CHANGE UP (ref 32–36)
MCV RBC AUTO: 87 FL — SIGNIFICANT CHANGE UP (ref 80–100)
METAMYELOCYTES # FLD: 5 % — HIGH (ref 0–0)
MONOCYTES # BLD AUTO: 2.19 K/UL — HIGH (ref 0–0.9)
MONOCYTES NFR BLD AUTO: 9 % — SIGNIFICANT CHANGE UP (ref 2–14)
MYELOCYTES NFR BLD: 4 % — HIGH (ref 0–0)
NEUTROPHILS # BLD AUTO: 14.85 K/UL — HIGH (ref 1.8–7.4)
NEUTROPHILS NFR BLD AUTO: 61 % — SIGNIFICANT CHANGE UP (ref 43–77)
NRBC # BLD: 0 /100 — SIGNIFICANT CHANGE UP (ref 0–0)
NRBC # BLD: SIGNIFICANT CHANGE UP /100 WBCS (ref 0–0)
PLAT MORPH BLD: NORMAL — SIGNIFICANT CHANGE UP
PLATELET # BLD AUTO: 125 K/UL — LOW (ref 150–400)
PROMYELOCYTES # FLD: 3 % — HIGH (ref 0–0)
RBC # BLD: 4.31 M/UL — SIGNIFICANT CHANGE UP (ref 3.8–5.2)
RBC # FLD: 18.2 % — HIGH (ref 10.3–14.5)
RBC BLD AUTO: SIGNIFICANT CHANGE UP
VARIANT LYMPHS # BLD: 1 % — SIGNIFICANT CHANGE UP (ref 0–6)
WBC # BLD: 24.35 K/UL — HIGH (ref 3.8–10.5)
WBC # FLD AUTO: 24.35 K/UL — HIGH (ref 3.8–10.5)

## 2022-01-04 ENCOUNTER — APPOINTMENT (OUTPATIENT)
Dept: INFUSION THERAPY | Facility: CLINIC | Age: 47
End: 2022-01-04

## 2022-01-04 LAB
ALBUMIN SERPL ELPH-MCNC: 4 G/DL — SIGNIFICANT CHANGE UP (ref 3.3–5)
ALP SERPL-CCNC: 136 U/L — HIGH (ref 40–120)
ALT FLD-CCNC: 36 U/L — SIGNIFICANT CHANGE UP (ref 10–45)
ANION GAP SERPL CALC-SCNC: 16 MMOL/L — SIGNIFICANT CHANGE UP (ref 5–17)
AST SERPL-CCNC: 52 U/L — HIGH (ref 10–40)
BILIRUB SERPL-MCNC: 0.5 MG/DL — SIGNIFICANT CHANGE UP (ref 0.2–1.2)
BUN SERPL-MCNC: 4 MG/DL — LOW (ref 7–23)
CALCIUM SERPL-MCNC: 8.8 MG/DL — SIGNIFICANT CHANGE UP (ref 8.4–10.5)
CANCER AG19-9 SERPL-ACNC: 18 U/ML — SIGNIFICANT CHANGE UP
CHLORIDE SERPL-SCNC: 100 MMOL/L — SIGNIFICANT CHANGE UP (ref 96–108)
CO2 SERPL-SCNC: 25 MMOL/L — SIGNIFICANT CHANGE UP (ref 22–31)
CREAT SERPL-MCNC: 0.69 MG/DL — SIGNIFICANT CHANGE UP (ref 0.5–1.3)
GLUCOSE SERPL-MCNC: 84 MG/DL — SIGNIFICANT CHANGE UP (ref 70–99)
MAGNESIUM SERPL-MCNC: 1.9 MG/DL — SIGNIFICANT CHANGE UP (ref 1.6–2.6)
POTASSIUM SERPL-MCNC: 3.3 MMOL/L — LOW (ref 3.5–5.3)
POTASSIUM SERPL-SCNC: 3.3 MMOL/L — LOW (ref 3.5–5.3)
PROT SERPL-MCNC: 6.5 G/DL — SIGNIFICANT CHANGE UP (ref 6–8.3)
SODIUM SERPL-SCNC: 141 MMOL/L — SIGNIFICANT CHANGE UP (ref 135–145)

## 2022-01-06 ENCOUNTER — APPOINTMENT (OUTPATIENT)
Dept: INFUSION THERAPY | Facility: CLINIC | Age: 47
End: 2022-01-06

## 2022-01-07 ENCOUNTER — APPOINTMENT (OUTPATIENT)
Dept: INFUSION THERAPY | Facility: CLINIC | Age: 47
End: 2022-01-07

## 2022-01-11 ENCOUNTER — RESULT REVIEW (OUTPATIENT)
Age: 47
End: 2022-01-11

## 2022-01-11 ENCOUNTER — APPOINTMENT (OUTPATIENT)
Dept: HEMATOLOGY ONCOLOGY | Facility: CLINIC | Age: 47
End: 2022-01-11
Payer: COMMERCIAL

## 2022-01-11 ENCOUNTER — APPOINTMENT (OUTPATIENT)
Dept: HEMATOLOGY ONCOLOGY | Facility: CLINIC | Age: 47
End: 2022-01-11

## 2022-01-11 ENCOUNTER — APPOINTMENT (OUTPATIENT)
Dept: INFUSION THERAPY | Facility: CLINIC | Age: 47
End: 2022-01-11

## 2022-01-11 DIAGNOSIS — U07.1 COVID-19: ICD-10-CM

## 2022-01-11 DIAGNOSIS — K21.9 GASTRO-ESOPHAGEAL REFLUX DISEASE W/OUT ESOPHAGITIS: ICD-10-CM

## 2022-01-11 LAB
ALBUMIN SERPL ELPH-MCNC: 3.5 G/DL — SIGNIFICANT CHANGE UP (ref 3.3–5)
ALP SERPL-CCNC: 90 U/L — SIGNIFICANT CHANGE UP (ref 40–120)
ALT FLD-CCNC: 29 U/L — SIGNIFICANT CHANGE UP (ref 10–45)
ANION GAP SERPL CALC-SCNC: 10 MMOL/L — SIGNIFICANT CHANGE UP (ref 5–17)
AST SERPL-CCNC: 30 U/L — SIGNIFICANT CHANGE UP (ref 10–40)
BASOPHILS # BLD AUTO: 0.06 K/UL — SIGNIFICANT CHANGE UP (ref 0–0.2)
BASOPHILS NFR BLD AUTO: 0.5 % — SIGNIFICANT CHANGE UP (ref 0–2)
BILIRUB SERPL-MCNC: 0.4 MG/DL — SIGNIFICANT CHANGE UP (ref 0.2–1.2)
BUN SERPL-MCNC: 12 MG/DL — SIGNIFICANT CHANGE UP (ref 7–23)
CALCIUM SERPL-MCNC: 9.2 MG/DL — SIGNIFICANT CHANGE UP (ref 8.4–10.5)
CHLORIDE SERPL-SCNC: 106 MMOL/L — SIGNIFICANT CHANGE UP (ref 96–108)
CO2 SERPL-SCNC: 28 MMOL/L — SIGNIFICANT CHANGE UP (ref 22–31)
CREAT SERPL-MCNC: 0.65 MG/DL — SIGNIFICANT CHANGE UP (ref 0.5–1.3)
EOSINOPHIL # BLD AUTO: 0.19 K/UL — SIGNIFICANT CHANGE UP (ref 0–0.5)
EOSINOPHIL NFR BLD AUTO: 1.6 % — SIGNIFICANT CHANGE UP (ref 0–6)
GLUCOSE SERPL-MCNC: 98 MG/DL — SIGNIFICANT CHANGE UP (ref 70–99)
HCT VFR BLD CALC: 34.9 % — SIGNIFICANT CHANGE UP (ref 34.5–45)
HGB BLD-MCNC: 11.1 G/DL — LOW (ref 11.5–15.5)
IMM GRANULOCYTES NFR BLD AUTO: 1.4 % — SIGNIFICANT CHANGE UP (ref 0–1.5)
LYMPHOCYTES # BLD AUTO: 3.7 K/UL — HIGH (ref 1–3.3)
LYMPHOCYTES # BLD AUTO: 30.4 % — SIGNIFICANT CHANGE UP (ref 13–44)
MCHC RBC-ENTMCNC: 29.1 PG — SIGNIFICANT CHANGE UP (ref 27–34)
MCHC RBC-ENTMCNC: 31.8 GM/DL — LOW (ref 32–36)
MCV RBC AUTO: 91.4 FL — SIGNIFICANT CHANGE UP (ref 80–100)
MONOCYTES # BLD AUTO: 1.47 K/UL — HIGH (ref 0–0.9)
MONOCYTES NFR BLD AUTO: 12.1 % — SIGNIFICANT CHANGE UP (ref 2–14)
NEUTROPHILS # BLD AUTO: 6.6 K/UL — SIGNIFICANT CHANGE UP (ref 1.8–7.4)
NEUTROPHILS NFR BLD AUTO: 54 % — SIGNIFICANT CHANGE UP (ref 43–77)
NRBC # BLD: 0 /100 WBCS — SIGNIFICANT CHANGE UP (ref 0–0)
PLATELET # BLD AUTO: 259 K/UL — SIGNIFICANT CHANGE UP (ref 150–400)
POTASSIUM SERPL-MCNC: 3.9 MMOL/L — SIGNIFICANT CHANGE UP (ref 3.5–5.3)
POTASSIUM SERPL-SCNC: 3.9 MMOL/L — SIGNIFICANT CHANGE UP (ref 3.5–5.3)
PROT SERPL-MCNC: 6.3 G/DL — SIGNIFICANT CHANGE UP (ref 6–8.3)
RBC # BLD: 3.82 M/UL — SIGNIFICANT CHANGE UP (ref 3.8–5.2)
RBC # FLD: 20 % — HIGH (ref 10.3–14.5)
SODIUM SERPL-SCNC: 145 MMOL/L — SIGNIFICANT CHANGE UP (ref 135–145)
WBC # BLD: 12.19 K/UL — HIGH (ref 3.8–10.5)
WBC # FLD AUTO: 12.19 K/UL — HIGH (ref 3.8–10.5)

## 2022-01-11 PROCEDURE — 99214 OFFICE O/P EST MOD 30 MIN: CPT

## 2022-01-11 NOTE — PHYSICAL EXAM
[de-identified] : Young woman  [de-identified] : anicteric [de-identified] : No C/C/E, right port without s/s infection [de-identified] : no rashes

## 2022-01-11 NOTE — RESULTS/DATA
[FreeTextEntry1] : WBC: 12.19     ANC: 6.6    Hgb: 11.1   Hct: 34.9   MCV: 91.4    Plts: 259\par CMP: Pending

## 2022-01-11 NOTE — ASSESSMENT
[FreeTextEntry1] : Patient is a 46 y.o. 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 \par \par Plan:\par 1. Onc - Given recent delay for COVID and poor tolerance of regime will dose reduce - Oxali 85-> 75, Iri 150 -> 130,  -> 200, 5FU 2400 -> 2000.  Will c/w aggressive antiemetic regime of Aloxi, Emend, and Dec IV, and Zyprexa D0,1,2,3, and Dec PO D4,5,6., as well as Aloxi and hydration on disconnect and D4 PRN.    \par 2. Neutropenia - Patient with UGT1A1 mutation (homo TA7). Had a dose delay for neutropenia - needs growth factor after each cycle.  Had difficulty with full dose due to bone pains. Tolerates 3mg dose well. \par 3. Pain - Now comfortable on current regime. Reminded cannot stop patches on own!!!  Will lower Fentanyl patch 50 -> 25mcg.  \par 4. Onc - Continue for up to 12 cycles.   Had good NJ after 4 cycles.  Repeat CT scans after 8 cycles. \par Appts made, Fentanyl renewed. \par Get scans week of 2/14/22\par PE 2/22/22 prior to C9 \par

## 2022-01-11 NOTE — HISTORY OF PRESENT ILLNESS
[de-identified] : BOUCHRA PALACIOS is a 46 y.o. with a PMH significant for hypothyroidism, who we are following for a BRCA2+, clinical stage III pancreatic tail cancer.\par \par ~8/2021 - Presented at age 45 with abdominal pains.  Saw PCP, referred to GI Dr. Maldonado. \par 9/30/21 - Abd US - 3.9 x 4.6 x 4.8cm heterogeneous mass in region of pancreatic tail. \par 10/7/21 - CT A/P - pancreatic tail mass with splenic artery encasement and splenic vein thrombosis.  Borderline PALN's. \par 10/11/21 - 10/15/21 - Admit to  for severe pain.  Was set up for outpatient EGD with biopsy but had severe pains, nausea, dry-heaving and so went to ER. \par 10/11/21 - CT Chest - no evidence of disease.\par 10/11/21 - MRCP - tumor encases splenic artery, celiac artery contact <180, splenic vein occlusion.  Possible retroperitoneal extension with left adrenal gland contact and possible extension into left celiac ganglion.\par 10/12/21 - EUS, FNA pancreatic mass - scanty specimen - PATH - poorly differentiated carcinoma. \par 10/14/21 - Celiac block attempted - not successful - no window due to tumor overlying plexus. \par Case presented at tumor board - for NEOAdjuvant FOLFIRINOX up to 12 cycles, reassess after 4.  Consider chemoRT if suboptimal response. 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 \par Dr. Howell - surgeon. \par \par 10/19/21 - Started FOLFIRINOX\par Patient with 1 week dose delay prior to C#4 for neutropenia and prior to C#6 due to COVID.\par \par 12/13/21 - CT C/A/P (after 4 cycles) - Pancreatic mass now 4.5 x 2.9cm (was 5.8 x 4.9cm).  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  New 2.3 x 1.2 medial subcapsular collection. ? seroma. Encasement of left gastric artery and celiac axis.  Splenic vein occlusion.  [de-identified] :  poorly differentiated carcinoma [de-identified] : 10/18/21 - [facility] Comprehensive genetic panel - BRCA2 +\par 10/22/21 - UGT1A1 - homo TA7 (*28) [de-identified] : Patient last treatment was 12/21 - 12/23/21.  She usually comes back the next day for 3mg Pegfilgrastim, however since this was a holiday she needed to have OnPro 6mg. \par She then called 12/28 reporting that she did not feel well - extreme fatigue, myalgias - came for IV hydration.  On 12/29/21 she called office to say she had tested positive for COVID (had at a [facility] facility).  Initially had attributed symptoms to full dose Neulasta but now more likely was from COVID.  Was advised to go for antibodies.  \par 12/30/21 she called -  had not gone for antibodies yet.  Was now with N/V/D.  Was advised to go to ER. \par 12/31/21 - Patient went to [facility] ER and was kept overnight. Reports was given IV hydration as well as potassium and Magnesium.  Also had COVID antibodies prior to discharge.  \par 1/4/21 - Was due for C#6 - we called her on 1/3/22 - she was still having abdominal pains when eating and still very nauseous.  Was hydrated - chemo delayed x 1 week. \par Was also hydrated 1/7/22. Was feeling much better at this point. \par Patient did report to RN last week that she had stopped her pantoprazole as she did not think she needed anymore. Was told to restart. \par \par She reports she is feeling much better now.  \par Her nausea regime is working well - Takes Zyprexa D0, 1,2,3 and then Decadron D3,4,5.  Aloxi was added on D3 or 4 with disconnect or Neulasta injection.  \par Feels cancer pain has resolved.  Wants to try and taper pain patch.  \par Denies significant neuropathies - not even too bad with cold. \par Denies any other changes in her family, medical, or social history since her last visit of 12/17/21.

## 2022-01-12 DIAGNOSIS — D70.1 AGRANULOCYTOSIS SECONDARY TO CANCER CHEMOTHERAPY: ICD-10-CM

## 2022-01-12 DIAGNOSIS — Z15.89 GENETIC SUSCEPTIBILITY TO OTHER DISEASE: ICD-10-CM

## 2022-01-12 DIAGNOSIS — G89.3 NEOPLASM RELATED PAIN (ACUTE) (CHRONIC): ICD-10-CM

## 2022-01-12 DIAGNOSIS — U07.1 COVID-19: ICD-10-CM

## 2022-01-13 ENCOUNTER — APPOINTMENT (OUTPATIENT)
Dept: INFUSION THERAPY | Facility: CLINIC | Age: 47
End: 2022-01-13

## 2022-01-14 ENCOUNTER — APPOINTMENT (OUTPATIENT)
Dept: INFUSION THERAPY | Facility: CLINIC | Age: 47
End: 2022-01-14

## 2022-01-24 ENCOUNTER — OUTPATIENT (OUTPATIENT)
Dept: OUTPATIENT SERVICES | Facility: HOSPITAL | Age: 47
LOS: 1 days | Discharge: HOME | End: 2022-01-24

## 2022-01-24 DIAGNOSIS — C25.9 MALIGNANT NEOPLASM OF PANCREAS, UNSPECIFIED: ICD-10-CM

## 2022-01-24 DIAGNOSIS — Z78.9 OTHER SPECIFIED HEALTH STATUS: Chronic | ICD-10-CM

## 2022-01-25 ENCOUNTER — RESULT REVIEW (OUTPATIENT)
Age: 47
End: 2022-01-25

## 2022-01-25 ENCOUNTER — APPOINTMENT (OUTPATIENT)
Dept: INFUSION THERAPY | Facility: CLINIC | Age: 47
End: 2022-01-25

## 2022-01-25 VITALS
DIASTOLIC BLOOD PRESSURE: 79 MMHG | SYSTOLIC BLOOD PRESSURE: 111 MMHG | HEART RATE: 78 BPM | WEIGHT: 160.5 LBS | OXYGEN SATURATION: 98 % | RESPIRATION RATE: 17 BRPM | HEIGHT: 64 IN | TEMPERATURE: 97.7 F | BODY MASS INDEX: 27.4 KG/M2

## 2022-01-25 LAB
BASOPHILS # BLD AUTO: 0.05 K/UL — SIGNIFICANT CHANGE UP (ref 0–0.2)
BASOPHILS NFR BLD AUTO: 0.5 % — SIGNIFICANT CHANGE UP (ref 0–2)
EOSINOPHIL # BLD AUTO: 0.32 K/UL — SIGNIFICANT CHANGE UP (ref 0–0.5)
EOSINOPHIL NFR BLD AUTO: 3.4 % — SIGNIFICANT CHANGE UP (ref 0–6)
HCT VFR BLD CALC: 34.6 % — SIGNIFICANT CHANGE UP (ref 34.5–45)
HGB BLD-MCNC: 11.2 G/DL — LOW (ref 11.5–15.5)
IMM GRANULOCYTES NFR BLD AUTO: 1.7 % — HIGH (ref 0–1.5)
LYMPHOCYTES # BLD AUTO: 3.08 K/UL — SIGNIFICANT CHANGE UP (ref 1–3.3)
LYMPHOCYTES # BLD AUTO: 32.8 % — SIGNIFICANT CHANGE UP (ref 13–44)
MCHC RBC-ENTMCNC: 30 PG — SIGNIFICANT CHANGE UP (ref 27–34)
MCHC RBC-ENTMCNC: 32.4 GM/DL — SIGNIFICANT CHANGE UP (ref 32–36)
MCV RBC AUTO: 92.8 FL — SIGNIFICANT CHANGE UP (ref 80–100)
MONOCYTES # BLD AUTO: 0.79 K/UL — SIGNIFICANT CHANGE UP (ref 0–0.9)
MONOCYTES NFR BLD AUTO: 8.4 % — SIGNIFICANT CHANGE UP (ref 2–14)
NEUTROPHILS # BLD AUTO: 5 K/UL — SIGNIFICANT CHANGE UP (ref 1.8–7.4)
NEUTROPHILS NFR BLD AUTO: 53.2 % — SIGNIFICANT CHANGE UP (ref 43–77)
NRBC # BLD: 0 /100 WBCS — SIGNIFICANT CHANGE UP (ref 0–0)
PLATELET # BLD AUTO: 134 K/UL — LOW (ref 150–400)
RBC # BLD: 3.73 M/UL — LOW (ref 3.8–5.2)
RBC # FLD: 21.2 % — HIGH (ref 10.3–14.5)
WBC # BLD: 9.4 K/UL — SIGNIFICANT CHANGE UP (ref 3.8–10.5)
WBC # FLD AUTO: 9.4 K/UL — SIGNIFICANT CHANGE UP (ref 3.8–10.5)

## 2022-01-26 DIAGNOSIS — R11.2 NAUSEA WITH VOMITING, UNSPECIFIED: ICD-10-CM

## 2022-01-26 DIAGNOSIS — Z51.11 ENCOUNTER FOR ANTINEOPLASTIC CHEMOTHERAPY: ICD-10-CM

## 2022-01-27 ENCOUNTER — APPOINTMENT (OUTPATIENT)
Dept: INFUSION THERAPY | Facility: CLINIC | Age: 47
End: 2022-01-27

## 2022-01-28 ENCOUNTER — APPOINTMENT (OUTPATIENT)
Dept: INFUSION THERAPY | Facility: CLINIC | Age: 47
End: 2022-01-28

## 2022-01-28 VITALS
TEMPERATURE: 97.7 F | RESPIRATION RATE: 17 BRPM | HEART RATE: 68 BPM | OXYGEN SATURATION: 98 % | SYSTOLIC BLOOD PRESSURE: 118 MMHG | DIASTOLIC BLOOD PRESSURE: 82 MMHG

## 2022-01-31 DIAGNOSIS — E86.0 DEHYDRATION: ICD-10-CM

## 2022-01-31 DIAGNOSIS — Z51.89 ENCOUNTER FOR OTHER SPECIFIED AFTERCARE: ICD-10-CM

## 2022-02-08 ENCOUNTER — RESULT REVIEW (OUTPATIENT)
Age: 47
End: 2022-02-08

## 2022-02-08 ENCOUNTER — APPOINTMENT (OUTPATIENT)
Dept: INFUSION THERAPY | Facility: CLINIC | Age: 47
End: 2022-02-08

## 2022-02-08 VITALS
DIASTOLIC BLOOD PRESSURE: 83 MMHG | WEIGHT: 162 LBS | TEMPERATURE: 97.7 F | BODY MASS INDEX: 27.66 KG/M2 | RESPIRATION RATE: 19 BRPM | OXYGEN SATURATION: 99 % | SYSTOLIC BLOOD PRESSURE: 124 MMHG | HEIGHT: 64 IN | HEART RATE: 86 BPM

## 2022-02-08 LAB
ALBUMIN SERPL ELPH-MCNC: 3.9 G/DL — SIGNIFICANT CHANGE UP (ref 3.3–5)
ALP SERPL-CCNC: 122 U/L — HIGH (ref 40–120)
ALT FLD-CCNC: 23 U/L — SIGNIFICANT CHANGE UP (ref 10–45)
ANION GAP SERPL CALC-SCNC: 15 MMOL/L — SIGNIFICANT CHANGE UP (ref 5–17)
AST SERPL-CCNC: 27 U/L — SIGNIFICANT CHANGE UP (ref 10–40)
BASOPHILS # BLD AUTO: 0.05 K/UL — SIGNIFICANT CHANGE UP (ref 0–0.2)
BASOPHILS NFR BLD AUTO: 0.5 % — SIGNIFICANT CHANGE UP (ref 0–2)
BILIRUB SERPL-MCNC: 0.4 MG/DL — SIGNIFICANT CHANGE UP (ref 0.2–1.2)
BUN SERPL-MCNC: 11 MG/DL — SIGNIFICANT CHANGE UP (ref 7–23)
CALCIUM SERPL-MCNC: 9.3 MG/DL — SIGNIFICANT CHANGE UP (ref 8.4–10.5)
CHLORIDE SERPL-SCNC: 105 MMOL/L — SIGNIFICANT CHANGE UP (ref 96–108)
CO2 SERPL-SCNC: 22 MMOL/L — SIGNIFICANT CHANGE UP (ref 22–31)
CREAT SERPL-MCNC: 0.63 MG/DL — SIGNIFICANT CHANGE UP (ref 0.5–1.3)
EOSINOPHIL # BLD AUTO: 0.2 K/UL — SIGNIFICANT CHANGE UP (ref 0–0.5)
EOSINOPHIL NFR BLD AUTO: 1.9 % — SIGNIFICANT CHANGE UP (ref 0–6)
GLUCOSE SERPL-MCNC: 84 MG/DL — SIGNIFICANT CHANGE UP (ref 70–99)
HCT VFR BLD CALC: 36.6 % — SIGNIFICANT CHANGE UP (ref 34.5–45)
HGB BLD-MCNC: 11.7 G/DL — SIGNIFICANT CHANGE UP (ref 11.5–15.5)
IMM GRANULOCYTES NFR BLD AUTO: 2.4 % — HIGH (ref 0–1.5)
LYMPHOCYTES # BLD AUTO: 2.97 K/UL — SIGNIFICANT CHANGE UP (ref 1–3.3)
LYMPHOCYTES # BLD AUTO: 28.3 % — SIGNIFICANT CHANGE UP (ref 13–44)
MCHC RBC-ENTMCNC: 30.2 PG — SIGNIFICANT CHANGE UP (ref 27–34)
MCHC RBC-ENTMCNC: 32 GM/DL — SIGNIFICANT CHANGE UP (ref 32–36)
MCV RBC AUTO: 94.3 FL — SIGNIFICANT CHANGE UP (ref 80–100)
MONOCYTES # BLD AUTO: 0.86 K/UL — SIGNIFICANT CHANGE UP (ref 0–0.9)
MONOCYTES NFR BLD AUTO: 8.2 % — SIGNIFICANT CHANGE UP (ref 2–14)
NEUTROPHILS # BLD AUTO: 6.18 K/UL — SIGNIFICANT CHANGE UP (ref 1.8–7.4)
NEUTROPHILS NFR BLD AUTO: 58.7 % — SIGNIFICANT CHANGE UP (ref 43–77)
NRBC # BLD: 0 /100 WBCS — SIGNIFICANT CHANGE UP (ref 0–0)
PLATELET # BLD AUTO: 132 K/UL — LOW (ref 150–400)
POTASSIUM SERPL-MCNC: 4.1 MMOL/L — SIGNIFICANT CHANGE UP (ref 3.5–5.3)
POTASSIUM SERPL-SCNC: 4.1 MMOL/L — SIGNIFICANT CHANGE UP (ref 3.5–5.3)
PROT SERPL-MCNC: 6.7 G/DL — SIGNIFICANT CHANGE UP (ref 6–8.3)
RBC # BLD: 3.88 M/UL — SIGNIFICANT CHANGE UP (ref 3.8–5.2)
RBC # FLD: 20.1 % — HIGH (ref 10.3–14.5)
SODIUM SERPL-SCNC: 141 MMOL/L — SIGNIFICANT CHANGE UP (ref 135–145)
WBC # BLD: 10.51 K/UL — HIGH (ref 3.8–10.5)
WBC # FLD AUTO: 10.51 K/UL — HIGH (ref 3.8–10.5)

## 2022-02-08 RX ORDER — FENTANYL 50 UG/H
50 PATCH, EXTENDED RELEASE TRANSDERMAL
Qty: 10 | Refills: 0 | Status: DISCONTINUED | COMMUNITY
Start: 2021-10-26 | End: 2022-02-08

## 2022-02-10 ENCOUNTER — APPOINTMENT (OUTPATIENT)
Dept: INFUSION THERAPY | Facility: CLINIC | Age: 47
End: 2022-02-10

## 2022-02-10 VITALS
HEART RATE: 81 BPM | OXYGEN SATURATION: 97 % | DIASTOLIC BLOOD PRESSURE: 75 MMHG | RESPIRATION RATE: 17 BRPM | SYSTOLIC BLOOD PRESSURE: 113 MMHG | TEMPERATURE: 98.4 F

## 2022-02-11 ENCOUNTER — APPOINTMENT (OUTPATIENT)
Dept: INFUSION THERAPY | Facility: CLINIC | Age: 47
End: 2022-02-11

## 2022-02-11 VITALS
HEART RATE: 70 BPM | DIASTOLIC BLOOD PRESSURE: 80 MMHG | BODY MASS INDEX: 27.81 KG/M2 | OXYGEN SATURATION: 96 % | RESPIRATION RATE: 18 BRPM | TEMPERATURE: 98.4 F | WEIGHT: 162 LBS | SYSTOLIC BLOOD PRESSURE: 116 MMHG

## 2022-02-15 ENCOUNTER — OUTPATIENT (OUTPATIENT)
Dept: OUTPATIENT SERVICES | Facility: HOSPITAL | Age: 47
LOS: 1 days | End: 2022-02-15
Payer: COMMERCIAL

## 2022-02-15 ENCOUNTER — APPOINTMENT (OUTPATIENT)
Dept: CT IMAGING | Facility: CLINIC | Age: 47
End: 2022-02-15
Payer: COMMERCIAL

## 2022-02-15 DIAGNOSIS — C25.9 MALIGNANT NEOPLASM OF PANCREAS, UNSPECIFIED: ICD-10-CM

## 2022-02-15 DIAGNOSIS — Z78.9 OTHER SPECIFIED HEALTH STATUS: Chronic | ICD-10-CM

## 2022-02-15 PROCEDURE — 74177 CT ABD & PELVIS W/CONTRAST: CPT

## 2022-02-15 PROCEDURE — 71260 CT THORAX DX C+: CPT | Mod: 26

## 2022-02-15 PROCEDURE — 74177 CT ABD & PELVIS W/CONTRAST: CPT | Mod: 26

## 2022-02-15 PROCEDURE — 71260 CT THORAX DX C+: CPT

## 2022-02-17 ENCOUNTER — APPOINTMENT (OUTPATIENT)
Dept: SURGICAL ONCOLOGY | Facility: CLINIC | Age: 47
End: 2022-02-17
Payer: COMMERCIAL

## 2022-02-17 PROCEDURE — 99214 OFFICE O/P EST MOD 30 MIN: CPT

## 2022-02-22 ENCOUNTER — APPOINTMENT (OUTPATIENT)
Dept: HEMATOLOGY ONCOLOGY | Facility: CLINIC | Age: 47
End: 2022-02-22

## 2022-02-22 ENCOUNTER — APPOINTMENT (OUTPATIENT)
Dept: INFUSION THERAPY | Facility: CLINIC | Age: 47
End: 2022-02-22

## 2022-02-24 ENCOUNTER — APPOINTMENT (OUTPATIENT)
Dept: INFUSION THERAPY | Facility: CLINIC | Age: 47
End: 2022-02-24

## 2022-02-25 ENCOUNTER — APPOINTMENT (OUTPATIENT)
Dept: INFUSION THERAPY | Facility: CLINIC | Age: 47
End: 2022-02-25

## 2022-03-02 ENCOUNTER — NON-APPOINTMENT (OUTPATIENT)
Age: 47
End: 2022-03-02

## 2022-03-02 ENCOUNTER — APPOINTMENT (OUTPATIENT)
Dept: RADIATION ONCOLOGY | Facility: CLINIC | Age: 47
End: 2022-03-02
Payer: COMMERCIAL

## 2022-03-02 VITALS
RESPIRATION RATE: 17 BRPM | OXYGEN SATURATION: 98 % | WEIGHT: 163 LBS | HEIGHT: 64 IN | DIASTOLIC BLOOD PRESSURE: 70 MMHG | BODY MASS INDEX: 27.83 KG/M2 | SYSTOLIC BLOOD PRESSURE: 111 MMHG | HEART RATE: 84 BPM

## 2022-03-02 DIAGNOSIS — Z84.81 FAMILY HISTORY OF CARRIER OF GENETIC DISEASE: ICD-10-CM

## 2022-03-02 PROCEDURE — 99204 OFFICE O/P NEW MOD 45 MIN: CPT | Mod: 25

## 2022-03-02 RX ORDER — HYDROMORPHONE HYDROCHLORIDE 4 MG/1
4 TABLET ORAL
Qty: 180 | Refills: 0 | Status: DISCONTINUED | COMMUNITY
Start: 2021-10-14 | End: 2022-03-02

## 2022-03-02 RX ORDER — OLANZAPINE 5 MG/1
5 TABLET, FILM COATED ORAL
Qty: 24 | Refills: 2 | Status: DISCONTINUED | COMMUNITY
Start: 2021-10-26 | End: 2022-03-02

## 2022-03-02 NOTE — REVIEW OF SYSTEMS
[Fatigue] : fatigue [Recent Change In Weight] : ~T recent weight change [Insomnia] : insomnia [Anxiety] : anxiety [Negative] : Allergic/Immunologic [FreeTextEntry7] : chemo related symptoms are improving, still nausea

## 2022-03-02 NOTE — PHYSICAL EXAM
[Bowel Sounds] : normal bowel sounds [Abdomen Soft] : soft [Nondistended] : nondistended [Abdomen Tenderness] : non-tender [Normal] : oriented to person, place and time, the affect was normal, the mood was normal and not anxious

## 2022-03-02 NOTE — OB/GYN HISTORY
[Approximately ___ (Month)] : the LMP was approximately [unfilled] month(s) ago [History of Birth Control Pills] : Patient has a history of taking birth control pills

## 2022-03-02 NOTE — HISTORY OF PRESENT ILLNESS
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  Saw PCP, referred to GI Dr. Maldonado. \par 9/30/21 - Abd US - 3.9 x 4.6 x 4.8cm heterogeneous mass in region of pancreatic tail. \par 10/7/21 - CT A/P - pancreatic tail mass with splenic artery encasement and splenic vein thrombosis.  Borderline PALN's. \par 10/11/21 - 10/15/21 - Admit to  for severe pain.  Was set up for outpatient EGD with biopsy but had severe pains, nausea, dry-heaving and so went to ER. \par 10/11/21 - CT Chest - no evidence of disease.\par 10/11/21 - MRCP - tumor encases splenic artery, celiac artery contact <180, splenic vein occlusion.  Possible retroperitoneal extension with left adrenal gland contact and possible extension into left celiac ganglion.\par 10/12/21 - EUS, FNA pancreatic mass - scanty specimen - PATH - poorly differentiated carcinoma. \par 10/14/21 - Celiac block attempted - not successful - no window due to tumor overlying plexus. 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 \par \par 10/18/21 - [facility] Comprehensive genetic panel - BRCA2 +\par 10/22/21 - UGT1A1 - homo TA7 (*28)\par \par 10/19/21 - Started FOLFIRINOX\par Patient with 1 week dose delay prior to C#4 for neutropenia and prior to C#6 due to COVID in December 2021\par \par 12/13/21 - CT C/A/P (after 4 cycles) - Pancreatic mass now 4.5 x 2.9cm (was 5.8 x 4.9cm).  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  New 2.3 x 1.2 medial subcapsular collection. ? seroma. Encasement of left gastric artery and celiac axis.  Splenic vein occlusion. \par She had COVID at the end of 12/21 with severe diarrhea and nausea and received antibody therapy and recovered.\par \par 2/15/22 CT C/A/P showing further decrease in size of pancreatic tail carcinoma with focal abutment less then 180 degrees of proximal celiac and proximal common hepatic artery by ill-defined peripancreatic low- attenuation which could represent neurovascular extent of the disease vs pancreatitis or treatment related changes.No venous enhancement involving superior mesenteric vein or mail portal vein. Similar extend of infiltrative low- attenuation which abuts the left adrenal gland and left celiac ganglion. Acute colitis involving the ascending transverse and descending colon. No evidence of metastatic disease in chest, abdomen, pelvis. \par \par She completed chemotherapy under Dr. Mendez care and was referred for evaluation regarding radiation treatment. Her pain has resolved and she is weaning off fentanyl patch. She has lost 20 lbs but her weight is now stable at 160 lbs. She presents today with her  mother  to discuss the role of the XRT in her care.

## 2022-03-03 PROCEDURE — 77332 RADIATION TREATMENT AID(S): CPT

## 2022-03-04 ENCOUNTER — APPOINTMENT (OUTPATIENT)
Dept: INFUSION THERAPY | Facility: CLINIC | Age: 47
End: 2022-03-04

## 2022-03-04 ENCOUNTER — RESULT REVIEW (OUTPATIENT)
Age: 47
End: 2022-03-04

## 2022-03-04 ENCOUNTER — APPOINTMENT (OUTPATIENT)
Dept: HEMATOLOGY ONCOLOGY | Facility: CLINIC | Age: 47
End: 2022-03-04
Payer: COMMERCIAL

## 2022-03-04 ENCOUNTER — APPOINTMENT (OUTPATIENT)
Dept: INFUSION THERAPY | Facility: CLINIC | Age: 47
End: 2022-03-04
Payer: COMMERCIAL

## 2022-03-04 ENCOUNTER — OUTPATIENT (OUTPATIENT)
Dept: OUTPATIENT SERVICES | Facility: HOSPITAL | Age: 47
LOS: 1 days | Discharge: HOME | End: 2022-03-04

## 2022-03-04 VITALS
SYSTOLIC BLOOD PRESSURE: 119 MMHG | OXYGEN SATURATION: 97 % | RESPIRATION RATE: 18 BRPM | HEART RATE: 79 BPM | BODY MASS INDEX: 27.98 KG/M2 | WEIGHT: 163 LBS | DIASTOLIC BLOOD PRESSURE: 82 MMHG

## 2022-03-04 DIAGNOSIS — Z78.9 OTHER SPECIFIED HEALTH STATUS: Chronic | ICD-10-CM

## 2022-03-04 DIAGNOSIS — C25.9 MALIGNANT NEOPLASM OF PANCREAS, UNSPECIFIED: ICD-10-CM

## 2022-03-04 LAB
BASOPHILS # BLD AUTO: 0.09 K/UL — SIGNIFICANT CHANGE UP (ref 0–0.2)
BASOPHILS NFR BLD AUTO: 1 % — SIGNIFICANT CHANGE UP (ref 0–2)
EOSINOPHIL # BLD AUTO: 0.15 K/UL — SIGNIFICANT CHANGE UP (ref 0–0.5)
EOSINOPHIL NFR BLD AUTO: 1.7 % — SIGNIFICANT CHANGE UP (ref 0–6)
HCT VFR BLD CALC: 37 % — SIGNIFICANT CHANGE UP (ref 34.5–45)
HGB BLD-MCNC: 12 G/DL — SIGNIFICANT CHANGE UP (ref 11.5–15.5)
IMM GRANULOCYTES NFR BLD AUTO: 0.2 % — SIGNIFICANT CHANGE UP (ref 0–1.5)
LYMPHOCYTES # BLD AUTO: 2.62 K/UL — SIGNIFICANT CHANGE UP (ref 1–3.3)
LYMPHOCYTES # BLD AUTO: 29 % — SIGNIFICANT CHANGE UP (ref 13–44)
MCHC RBC-ENTMCNC: 31.5 PG — SIGNIFICANT CHANGE UP (ref 27–34)
MCHC RBC-ENTMCNC: 32.4 GM/DL — SIGNIFICANT CHANGE UP (ref 32–36)
MCV RBC AUTO: 97.1 FL — SIGNIFICANT CHANGE UP (ref 80–100)
MONOCYTES # BLD AUTO: 0.95 K/UL — HIGH (ref 0–0.9)
MONOCYTES NFR BLD AUTO: 10.5 % — SIGNIFICANT CHANGE UP (ref 2–14)
NEUTROPHILS # BLD AUTO: 5.2 K/UL — SIGNIFICANT CHANGE UP (ref 1.8–7.4)
NEUTROPHILS NFR BLD AUTO: 57.6 % — SIGNIFICANT CHANGE UP (ref 43–77)
NRBC # BLD: 0 /100 WBCS — SIGNIFICANT CHANGE UP (ref 0–0)
PLATELET # BLD AUTO: 197 K/UL — SIGNIFICANT CHANGE UP (ref 150–400)
RBC # BLD: 3.81 M/UL — SIGNIFICANT CHANGE UP (ref 3.8–5.2)
RBC # FLD: 17.6 % — HIGH (ref 10.3–14.5)
WBC # BLD: 9.03 K/UL — SIGNIFICANT CHANGE UP (ref 3.8–10.5)
WBC # FLD AUTO: 9.03 K/UL — SIGNIFICANT CHANGE UP (ref 3.8–10.5)

## 2022-03-04 PROCEDURE — 77263 THER RADIOLOGY TX PLNG CPLX: CPT

## 2022-03-04 PROCEDURE — 99215 OFFICE O/P EST HI 40 MIN: CPT

## 2022-03-06 LAB
ALBUMIN SERPL ELPH-MCNC: 4.1 G/DL — SIGNIFICANT CHANGE UP (ref 3.3–5)
ALP SERPL-CCNC: 89 U/L — SIGNIFICANT CHANGE UP (ref 40–120)
ALT FLD-CCNC: 21 U/L — SIGNIFICANT CHANGE UP (ref 10–45)
ANION GAP SERPL CALC-SCNC: 17 MMOL/L — SIGNIFICANT CHANGE UP (ref 5–17)
AST SERPL-CCNC: 25 U/L — SIGNIFICANT CHANGE UP (ref 10–40)
BILIRUB SERPL-MCNC: 0.4 MG/DL — SIGNIFICANT CHANGE UP (ref 0.2–1.2)
BUN SERPL-MCNC: 11 MG/DL — SIGNIFICANT CHANGE UP (ref 7–23)
CALCIUM SERPL-MCNC: 9.2 MG/DL — SIGNIFICANT CHANGE UP (ref 8.4–10.5)
CHLORIDE SERPL-SCNC: 103 MMOL/L — SIGNIFICANT CHANGE UP (ref 96–108)
CO2 SERPL-SCNC: 20 MMOL/L — LOW (ref 22–31)
CREAT SERPL-MCNC: 0.55 MG/DL — SIGNIFICANT CHANGE UP (ref 0.5–1.3)
EGFR: 116 ML/MIN/1.73M2 — SIGNIFICANT CHANGE UP
GLUCOSE SERPL-MCNC: 76 MG/DL — SIGNIFICANT CHANGE UP (ref 70–99)
POTASSIUM SERPL-MCNC: 4.4 MMOL/L — SIGNIFICANT CHANGE UP (ref 3.5–5.3)
POTASSIUM SERPL-SCNC: 4.4 MMOL/L — SIGNIFICANT CHANGE UP (ref 3.5–5.3)
PROT SERPL-MCNC: 6.6 G/DL — SIGNIFICANT CHANGE UP (ref 6–8.3)
SODIUM SERPL-SCNC: 140 MMOL/L — SIGNIFICANT CHANGE UP (ref 135–145)

## 2022-03-07 ENCOUNTER — APPOINTMENT (OUTPATIENT)
Dept: INFUSION THERAPY | Facility: CLINIC | Age: 47
End: 2022-03-07

## 2022-03-08 PROCEDURE — 77300 RADIATION THERAPY DOSE PLAN: CPT

## 2022-03-08 PROCEDURE — 77301 RADIOTHERAPY DOSE PLAN IMRT: CPT

## 2022-03-08 PROCEDURE — 77338 DESIGN MLC DEVICE FOR IMRT: CPT

## 2022-03-08 PROCEDURE — 77293 RESPIRATOR MOTION MGMT SIMUL: CPT

## 2022-03-09 ENCOUNTER — APPOINTMENT (OUTPATIENT)
Dept: INFUSION THERAPY | Facility: CLINIC | Age: 47
End: 2022-03-09

## 2022-03-09 NOTE — ASSESSMENT
[FreeTextEntry1] : 46 y/o female BRCA 2 mutation carrier  with clinical stage III  pancreatic adenocarcinoma. 10/19/21- Started on NEOAdjuvant FOLFIRINOX. \par \par S/p 8 cycles ( last chemo 2/8/22) . Excellent response on follow up CT scans.\par Evaluated by surgical oncology Dr Howell.\par Plan for neoadjuvant RT concurrent with sensitizing chemotherapy to further improve resectability.\par She already met with rad oncology ( Dr Tobar) - plan to start radiation in next 1-2 weeks.\par Discussed options for sensitizing chemotherapy : 5Fu based- capecitabine versus gemcitabine.\par Plan for capecitabine - equally effective and  better tolerated compared to gemcitabine. \par Capecitabine 825 mg/ m2 twice daily Monday  through Friday x 6 weeks during radiation.  ( 1300 mg bid ) \par Discussed side effects.\par She will return for chemotherapy education visit with NP next week. \par \par She will complete adjuvant chemotherapy - four more cycles of Folfirinox- after surgery. \par \par D/w patient and her mother.

## 2022-03-09 NOTE — HISTORY OF PRESENT ILLNESS
[Disease: _____________________] : Disease: [unfilled] [T: ___] : T[unfilled] [N: ___] : N[unfilled] [AJCC Stage: ____] : AJCC Stage: [unfilled] [de-identified] : BOUCHRA PALACIOS is a 45 y.o. with a PMH significant for hypothyroidism, in Oct 2021 diagnosed with  a clinical stage III pancreatic cancer.\par \par ~8/2021 - Presented at age 45 with abdominal pains.  Saw PCP, referred to GI Dr. Maldonado. \par 9/30/21 - Abd US - 3.9 x 4.6 x 4.8cm heterogeneous mass in region of pancreatic tail. \par 10/7/21 - CT A/P - pancreatic tail mass with splenic artery encasement and splenic vein thrombosis ( tumor thrombus).  Borderline PALN's. \par 10/11/21 - 10/15/21 - Admit to  for severe pain.  Was set up for outpatient EGD with biopsy but had severe pains, nausea, dry-heaving and so went to ER. \par 10/11/21 - CT Chest - no evidence of disease.\par 10/11/21 - MRCP - tumor encases splenic artery, celiac artery contact <180, splenic vein occlusion.  Possible retroperitoneal extension with left adrenal gland contact and possible extension into left celiac ganglion.\par 10/12/21 - EUS, FNA pancreatic mass - scanty specimen - PATH - poorly differentiated carcinoma. \par 10/14/21 - Celiac block attempted - not successful - no window due to tumor overlying plexus. \par Case presented at tumor board - for NEOAdjuvant FOLFIRINOX up to 12 cycles, reassess after 4.  Consider chemoRT if suboptimal response. \par Dr. Howell - surgeon. \par 10/19/21 - Started FOLFIRINOX\par \par CT CAP 12/13/21 post 4 cycles of Folfirinox :  pancreatic mass  smaller ( 4.5 from 5.8 cm) but persistent vascular encasement and now with complete occlusion of splenic artery and large splenic infarct) . \par \par CT CAP 2/15/22 ( post 8 cycles of  Folfirinox) further decrease in pancreatic mass, no new areas of disease.\par \par She was evaluated by  surgical  oncology  and plan is for neoadjuvant chemoRT  to  further improve resectability .\par \par She met with Rad oncology and radiation planned to start in next 2 weeks. [de-identified] :  poorly differentiated carcinoma [de-identified] : 10/18/21 - [facility] Comprehensive genetic panel - BRCA 2 ( heterogenous for pathogenic variant in BRCA 2) \par \par 10/22/21 - UGT1A1 - positive for homozygous TA7 variant  [de-identified] : Feels well. Eating quite well. Weight is stable. No nausea. Pain much better- pain meds being tapered down ( fentanyl patch 12 mcg/h ) C/o insomnia  ( temazepam no longer helping

## 2022-03-09 NOTE — PHYSICAL EXAM
[Restricted in physically strenuous activity but ambulatory and able to carry out work of a light or sedentary nature] : Status 1- Restricted in physically strenuous activity but ambulatory and able to carry out work of a light or sedentary nature, e.g., light house work, office work [Normal] : affect appropriate [de-identified] : looks well   [de-identified] : no mucositis

## 2022-03-09 NOTE — REVIEW OF SYSTEMS
[Anxiety] : anxiety [Negative] : Neurological [Fatigue] : no fatigue [Insomnia] : insomnia [FreeTextEntry2] : weight stable  [FreeTextEntry7] : per HPI

## 2022-03-09 NOTE — REASON FOR VISIT
[Follow-Up Visit] : a follow-up [Other: _____] : [unfilled] [FreeTextEntry2] : Pancreatic Cancer s/p  neoadjuvant  FOLFIRINOX

## 2022-03-10 ENCOUNTER — APPOINTMENT (OUTPATIENT)
Dept: INFUSION THERAPY | Facility: CLINIC | Age: 47
End: 2022-03-10

## 2022-03-14 ENCOUNTER — NON-APPOINTMENT (OUTPATIENT)
Age: 47
End: 2022-03-14

## 2022-03-14 ENCOUNTER — APPOINTMENT (OUTPATIENT)
Dept: HEMATOLOGY ONCOLOGY | Facility: CLINIC | Age: 47
End: 2022-03-14
Payer: COMMERCIAL

## 2022-03-14 VITALS
HEART RATE: 75 BPM | RESPIRATION RATE: 18 BRPM | BODY MASS INDEX: 27.98 KG/M2 | DIASTOLIC BLOOD PRESSURE: 79 MMHG | SYSTOLIC BLOOD PRESSURE: 116 MMHG | TEMPERATURE: 97.8 F | OXYGEN SATURATION: 98 % | WEIGHT: 163 LBS

## 2022-03-14 DIAGNOSIS — Z15.89 GENETIC SUSCEPTIBILITY TO OTHER DISEASE: ICD-10-CM

## 2022-03-14 DIAGNOSIS — M89.8X9 OTHER SPECIFIED DISORDERS OF BONE, UNSPECIFIED SITE: ICD-10-CM

## 2022-03-14 PROCEDURE — 99215 OFFICE O/P EST HI 40 MIN: CPT

## 2022-03-14 PROCEDURE — G6015: CPT

## 2022-03-14 PROCEDURE — G6002: CPT

## 2022-03-15 ENCOUNTER — NON-APPOINTMENT (OUTPATIENT)
Age: 47
End: 2022-03-15

## 2022-03-15 VITALS — BODY MASS INDEX: 27.83 KG/M2 | WEIGHT: 163 LBS | HEIGHT: 64 IN

## 2022-03-15 DIAGNOSIS — Z79.899 OTHER LONG TERM (CURRENT) DRUG THERAPY: ICD-10-CM

## 2022-03-15 PROCEDURE — G6002: CPT

## 2022-03-15 PROCEDURE — G6015: CPT

## 2022-03-15 NOTE — DISEASE MANAGEMENT
[Clinical] : TNM Stage: c [IIIC] : IIIC [TTNM] : 4 [NTNM] : x [MTNM] : 0 [de-identified] : 200 [de-identified] : 8586 [de-identified] : Abdomen/Pancreas

## 2022-03-15 NOTE — HISTORY OF PRESENT ILLNESS
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  Saw PCP, referred to GI Dr. Maldonado. \par 9/30/21 - Abd US - 3.9 x 4.6 x 4.8cm heterogeneous mass in region of pancreatic tail. \par 10/7/21 - CT A/P - pancreatic tail mass with splenic artery encasement and splenic vein thrombosis.  Borderline PALN's. \par 10/11/21 - 10/15/21 - Admit to  for severe pain.  Was set up for outpatient EGD with biopsy but had severe pains, nausea, dry-heaving and so went to ER. \par 10/11/21 - CT Chest - no evidence of disease.\par 10/11/21 - MRCP - tumor encases splenic artery, celiac artery contact <180, splenic vein occlusion.  Possible retroperitoneal extension with left adrenal gland contact and possible extension into left celiac ganglion.\par 10/12/21 - EUS, FNA pancreatic mass - scanty specimen - PATH - poorly differentiated carcinoma. \par 10/14/21 - Celiac block attempted - not successful - no window due to tumor overlying plexus. 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 \par \par 10/18/21 - INVITAE Comprehensive genetic panel - BRCA2 +\par 10/22/21 - UGT1A1 - homo TA7 (*28)\par \par 10/19/21 - Started FOLFIRINOX\par Patient with 1 week dose delay prior to C#4 for neutropenia and prior to C#6 due to COVID in December 2021\par \par 12/13/21 - CT C/A/P (after 4 cycles) - Pancreatic mass now 4.5 x 2.9cm (was 5.8 x 4.9cm).  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  New 2.3 x 1.2 medial subcapsular collection. ? seroma. Encasement of left gastric artery and celiac axis.  Splenic vein occlusion. \par She had COVID at the end of 12/21 with severe diarrhea and nausea and received antibody therapy and recovered.\par \par 2/15/22 CT C/A/P showing further decrease in size of pancreatic tail carcinoma with focal abutment less then 180 degrees of proximal celiac and proximal common hepatic artery by ill-defined peripancreatic low- attenuation which could represent neurovascular extent of the disease vs pancreatitis or treatment related changes.No venous enhancement involving superior mesenteric vein or mail portal vein. Similar extend of infiltrative low- attenuation which abuts the left adrenal gland and left celiac ganglion. Acute colitis involving the ascending transverse and descending colon. No evidence of metastatic disease in chest, abdomen, pelvis. \par \par She completed chemotherapy under Dr. Andrea weaver and was referred for evaluation regarding radiation treatment. Her pain has resolved and she is weaning off fentanyl patch. She has lost 20 lbs but her weight is now stable at 160 lbs. She presents today with her  mother  to discuss the role of the XRT in her care.\par \par She presents for an OTV 2/25 fx. Feeling well. Taking concurrent Capecitabine. Reports she has a lot of emotional stress. Denies nausea. Reports low appetite and poor sleep.

## 2022-03-16 PROCEDURE — G6015: CPT

## 2022-03-16 PROCEDURE — G6002: CPT

## 2022-03-17 ENCOUNTER — NON-APPOINTMENT (OUTPATIENT)
Age: 47
End: 2022-03-17

## 2022-03-17 PROCEDURE — G6002: CPT

## 2022-03-17 PROCEDURE — G6015: CPT

## 2022-03-18 ENCOUNTER — APPOINTMENT (OUTPATIENT)
Dept: HEMATOLOGY ONCOLOGY | Facility: CLINIC | Age: 47
End: 2022-03-18

## 2022-03-18 PROCEDURE — 77427 RADIATION TX MANAGEMENT X5: CPT

## 2022-03-18 PROCEDURE — G6002: CPT

## 2022-03-18 PROCEDURE — G6015: CPT

## 2022-03-18 PROCEDURE — 77336 RADIATION PHYSICS CONSULT: CPT

## 2022-03-21 PROCEDURE — G6015: CPT

## 2022-03-21 PROCEDURE — G6002: CPT

## 2022-03-22 ENCOUNTER — APPOINTMENT (OUTPATIENT)
Dept: INFUSION THERAPY | Facility: CLINIC | Age: 47
End: 2022-03-22

## 2022-03-22 ENCOUNTER — NON-APPOINTMENT (OUTPATIENT)
Age: 47
End: 2022-03-22

## 2022-03-22 VITALS
WEIGHT: 161 LBS | BODY MASS INDEX: 27.49 KG/M2 | OXYGEN SATURATION: 99 % | RESPIRATION RATE: 18 BRPM | DIASTOLIC BLOOD PRESSURE: 76 MMHG | HEIGHT: 64 IN | SYSTOLIC BLOOD PRESSURE: 118 MMHG | HEART RATE: 82 BPM

## 2022-03-22 PROCEDURE — G6015: CPT

## 2022-03-22 PROCEDURE — G6002: CPT

## 2022-03-22 NOTE — DISEASE MANAGEMENT
[Clinical] : TNM Stage: c [IIIC] : IIIC [TTNM] : 4 [NTNM] : x [MTNM] : 0 [de-identified] : 5015 [de-identified] : 1367 [de-identified] : Abdomen/Pancreas

## 2022-03-22 NOTE — HISTORY OF PRESENT ILLNESS
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  Saw PCP, referred to GI Dr. Maldonado. \par 9/30/21 - Abd US - 3.9 x 4.6 x 4.8cm heterogeneous mass in region of pancreatic tail. \par 10/7/21 - CT A/P - pancreatic tail mass with splenic artery encasement and splenic vein thrombosis.  Borderline PALN's. \par 10/11/21 - 10/15/21 - Admit to  for severe pain.  Was set up for outpatient EGD with biopsy but had severe pains, nausea, dry-heaving and so went to ER. \par 10/11/21 - CT Chest - no evidence of disease.\par 10/11/21 - MRCP - tumor encases splenic artery, celiac artery contact <180, splenic vein occlusion.  Possible retroperitoneal extension with left adrenal gland contact and possible extension into left celiac ganglion.\par 10/12/21 - EUS, FNA pancreatic mass - scanty specimen - PATH - poorly differentiated carcinoma. \par 10/14/21 - Celiac block attempted - not successful - no window due to tumor overlying plexus. 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 \par \par 10/18/21 - INVITAE Comprehensive genetic panel - BRCA2 +\par 10/22/21 - UGT1A1 - homo TA7 (*28)\par \par 10/19/21 - Started FOLFIRINOX\par Patient with 1 week dose delay prior to C#4 for neutropenia and prior to C#6 due to COVID in December 2021\par \par 12/13/21 - CT C/A/P (after 4 cycles) - Pancreatic mass now 4.5 x 2.9cm (was 5.8 x 4.9cm).  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  New 2.3 x 1.2 medial subcapsular collection. ? seroma. Encasement of left gastric artery and celiac axis.  Splenic vein occlusion. \par She had COVID at the end of 12/21 with severe diarrhea and nausea and received antibody therapy and recovered.\par \par 2/15/22 CT C/A/P showing further decrease in size of pancreatic tail carcinoma with focal abutment less then 180 degrees of proximal celiac and proximal common hepatic artery by ill-defined peripancreatic low- attenuation which could represent neurovascular extent of the disease vs pancreatitis or treatment related changes.No venous enhancement involving superior mesenteric vein or mail portal vein. Similar extend of infiltrative low- attenuation which abuts the left adrenal gland and left celiac ganglion. Acute colitis involving the ascending transverse and descending colon. No evidence of metastatic disease in chest, abdomen, pelvis. \par \par She completed chemotherapy under Dr. Andrea weaver and was referred for evaluation regarding radiation treatment. Her pain has resolved and she is weaning off fentanyl patch. She has lost 20 lbs but her weight is now stable at 160 lbs. She presents today with her  mother  to discuss the role of the XRT in her care.\par \par She presents for an OTV 7/25 fx. Feeling well. Taking concurrent Capecitabine. Reports she has a lot of emotional stress. Minimal nausea. Reports low appetite and poor sleep. She is taking Ativan every night, reports diarrhea started yesterday. Not taking zofran. Poor appetite and not eating much. + fatigue despite taking ativan nightly.

## 2022-03-24 ENCOUNTER — APPOINTMENT (OUTPATIENT)
Dept: INFUSION THERAPY | Facility: CLINIC | Age: 47
End: 2022-03-24

## 2022-03-24 PROCEDURE — G6002: CPT

## 2022-03-24 PROCEDURE — G6015: CPT

## 2022-03-25 ENCOUNTER — APPOINTMENT (OUTPATIENT)
Dept: INFUSION THERAPY | Facility: CLINIC | Age: 47
End: 2022-03-25

## 2022-03-25 PROCEDURE — G6015: CPT

## 2022-03-25 PROCEDURE — G6002: CPT

## 2022-03-26 NOTE — ASSESSMENT
[FreeTextEntry1] : Ms. BOUCHRA PALACIOS  is a 46 year  old female with PMHx hypothyroid, depression and BRCA2+, recently diagnosed with clinical stage III pancreatic tail cancer.  Pt. was started on neoadjuvant FOLFIRINOX on 10/19/2021.  Pt. will continue for up to 12 cycles, restage after 4th cycle. \par \par CT C/A/P performed on 12/13/21 revealed Pancreatic tail mass decreased in size since 10/11/2021. Apparent new occlusion splenic artery with extensive splenic infarction. Encasement left gastric artery and celiac axis. Splenic vein occlusion.\par \par -Pt. testing positive for COVID in 12/2021. \par -On chemo under the care of Dr. Mendez.  \par -Re-staging CT C/A/P performed on 2/15/2022 showed further decrease in size of the pancreatic tail carcinoma as described with focal abutment less than 180 degrees of proximal celiac artery and proximal common hepatic artery by ill-defined peripancreatic low-attenuation which could represent neurovascular extent of disease versus pancreatitis or treatment-related changes. No venous encasement involving superior mesenteric vein or main portal vein. Similar extent of infiltrative low-attenuation which abuts the left adrenal gland and left celiac ganglion. Acute colitis involving the ascending transverse and descending colon.  No imaging evidence of metastatic disease involving the chest, abdomen or pelvis. \par \par PLAN:\par 1) Cont seeing Med onc - chemotherapy\par 2) Rad onc for preoperative XRT prior to surgery\par 3) OR for exploration.  Plan for 4-5 weeks after the conclusion of XRT. We discussed the risks, benefits, and alternatives of the procedure with the patient, she expressed understanding and agree to proceed with diagnostic laparoscopy, distal pancreatectomy, left adrenalectomy, splenectomy, possible Modified Carlos procedure with resection of the celiac axis in order to clear the disease.\par \par  Will proceed to XRT if no signs of progression or metastatic disease with a plan to explore operatively 4 weeks after the completion of XRT.\par

## 2022-03-26 NOTE — HISTORY OF PRESENT ILLNESS
[de-identified] : Ms. BOUCHRA PALACIOS  is a 46 year  old female  presenting for a follow up visit. \par \par HOSPITAL COURSE @ [facility] 10/11/2021-10/15/2021: \par 45 year old female with a PMHx of hypothyroidism and depression presented to the [facility] ED on 10/11 for abdominal pain that had lasted for a month. Prior to presentation patient had been worked up by her gastroenterologist outpatient who had ordered an abdominal ultrasound. Ultrasound at that time showed a pancreatic mass. Subsequent CT abd/pelvis showed a pancreatic tail mass with splenic encasement. Patient was scheduled for a EGD with biopsy with Dr. Campbell however patient was in acute distress and presented to ED on 10/10. Gastroenterology was consulted and patient was seen by Dr. Campbell who performed a EUS FNB. 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 Hem/Onc was consulted and patient was evaluated with Dr. Mendez who did not recommend anti-coagulation because the thrombosis was a direct result of the tumor encasement. Patient was also seen by Dr. Howell Surgical Oncology who plans to resect the mass. Patient case was presented at a GI Tumour board and a 12 cycle course of Folfirinox prior to surgical resection was planned. CXR and CT Abdomen indicated no metastatic disease to the lungs or liver. During hospitalization a nerve block of the celiac plexus was attempted by Adrian but was unsuccessful due to risk of puncturing and seeding the pancreatic mass. Patient's plain was controlled via IV Dilaudid and a Fentanyl patch. Patient \par will continue to take PO Dilaudid and apply the fentanyl patch. Patient also underwent a procedure with Interventional Radiology to implant a chemotherapy port. Patient will see Dr. Mendez outpatient on 10/18 and will begin chemotherapy 10/19. Patient will continue to take all other home meds including zofran for nausea. Recommend close follow up with PCP Dr. Davis within 1 \par week of discharge. \par \par Pancreas tail biopsy 10/11/2021- PATH: Poorly differentiated carcinoma\par \par Pts paternal aunt underwent genetic testing and was found to be BRCA 2+.  Pt. then decided to pursue genetic Testing in 2021 which revealed she is a BRCA 2+ gene carrier.  She met with Dr. Vera (Genetic counselor) and will undergo more extensive genetic testing. Results pending. \par \par PMH: Depression, hypothyroidism, BRCA 2+, \par PSH:  EGD/EUS\par Social Hx: Never a smoker, social alcohol use\par \par INTERVAL HISTORY:\par 10/21/2021- Pt. was started on neoadjuvant chemotherapy with FOLFIRINOX on 10/19/2021.  Having nausea and diarrhea.  Also pain in back with poor pain control with narcotic medication.\par \par 21 Ca 19-9 28 U/mL\par \par CT C/A/P performed on 21 revealed Pancreatic tail mass decreased in size since 10/11/2021. Apparent new occlusion splenic artery with extensive splenic infarction. Encasement left gastric artery and celiac axis. Splenic vein occlusion.\par \par 21- Pt cont. on Folfirinox. Today she is feeling pretty well.  She has had some tough symptoms which have been managed appropriately.  She is overall holding up well.\par \par 2022= Pt. testing positive for COVID in 2021. On chemo under the care of Dr. Mendez.  Re-staging CT C/A/P performed on 2/15/2022 showed further decrease in size of the pancreatic tail carcinoma as described with focal abutment less than 180 degrees of proximal celiac artery and proximal common hepatic artery by ill-defined peripancreatic low-attenuation which could represent neurovascular extent of disease versus pancreatitis or treatment-related changes. No venous encasement involving superior mesenteric vein or main portal vein. Similar extent of infiltrative low-attenuation which abuts the left adrenal gland and left celiac ganglion. Acute colitis involving the ascending transverse and descending colon.  No imaging evidence of metastatic disease involving the chest, abdomen or pelvis. \par

## 2022-03-26 NOTE — CONSULT LETTER
[Dear  ___] : Dear  [unfilled], [Consult Closing:] : Thank you very much for allowing me to participate in the care of this patient.  If you have any questions, please do not hesitate to contact me. [Courtesy Letter:] : I had the pleasure of seeing your patient, [unfilled], in my office today. [Sincerely,] : Sincerely, [FreeTextEntry3] : Juni Howell MD, MPH, FACS, FSSO\par , [facility] Surgical Oncology [facility]\par [facility] [place]\par Associate Professor of Surgery\par [employer] [facility]  [DrVidhi  ___] : Dr. MENDEZ [DrVidhi ___] : Dr. MENDEZ

## 2022-03-26 NOTE — PHYSICAL EXAM
[Normal] : supple, no neck mass and thyroid not enlarged [Normal Neck Lymph Nodes] : normal neck lymph nodes  [Normal Supraclavicular Lymph Nodes] : normal supraclavicular lymph nodes [Normal Groin Lymph Nodes] : normal groin lymph nodes [Normal Axillary Lymph Nodes] : normal axillary lymph nodes [Normal] : oriented to person, place and time, with appropriate affect [de-identified] : soft NT ND

## 2022-03-28 PROCEDURE — 77336 RADIATION PHYSICS CONSULT: CPT

## 2022-03-28 PROCEDURE — G6015: CPT

## 2022-03-28 PROCEDURE — G6002: CPT

## 2022-03-28 PROCEDURE — 77427 RADIATION TX MANAGEMENT X5: CPT

## 2022-03-29 ENCOUNTER — NON-APPOINTMENT (OUTPATIENT)
Age: 47
End: 2022-03-29

## 2022-03-29 VITALS
OXYGEN SATURATION: 98 % | HEART RATE: 64 BPM | RESPIRATION RATE: 18 BRPM | SYSTOLIC BLOOD PRESSURE: 114 MMHG | WEIGHT: 160 LBS | HEIGHT: 64 IN | DIASTOLIC BLOOD PRESSURE: 74 MMHG | BODY MASS INDEX: 27.31 KG/M2

## 2022-03-29 PROCEDURE — G6002: CPT

## 2022-03-29 PROCEDURE — G6015: CPT

## 2022-03-29 NOTE — HISTORY OF PRESENT ILLNESS
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  Saw PCP, referred to GI Dr. Maldonado. \par 9/30/21 - Abd US - 3.9 x 4.6 x 4.8cm heterogeneous mass in region of pancreatic tail. \par 10/7/21 - CT A/P - pancreatic tail mass with splenic artery encasement and splenic vein thrombosis.  Borderline PALN's. \par 10/11/21 - 10/15/21 - Admit to  for severe pain.  Was set up for outpatient EGD with biopsy but had severe pains, nausea, dry-heaving and so went to ER. \par 10/11/21 - CT Chest - no evidence of disease.\par 10/11/21 - MRCP - tumor encases splenic artery, celiac artery contact <180, splenic vein occlusion.  Possible retroperitoneal extension with left adrenal gland contact and possible extension into left celiac ganglion.\par 10/12/21 - EUS, FNA pancreatic mass - scanty specimen - PATH - poorly differentiated carcinoma. \par 10/14/21 - Celiac block attempted - not successful - no window due to tumor overlying plexus. 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 \par \par 10/18/21 - INVITAE Comprehensive genetic panel - BRCA2 +\par 10/22/21 - UGT1A1 - homo TA7 (*28)\par \par 10/19/21 - Started FOLFIRINOX\par Patient with 1 week dose delay prior to C#4 for neutropenia and prior to C#6 due to COVID in December 2021\par \par 12/13/21 - CT C/A/P (after 4 cycles) - Pancreatic mass now 4.5 x 2.9cm (was 5.8 x 4.9cm).  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  New 2.3 x 1.2 medial subcapsular collection. ? seroma. Encasement of left gastric artery and celiac axis.  Splenic vein occlusion. \par She had COVID at the end of 12/21 with severe diarrhea and nausea and received antibody therapy and recovered.\par \par 2/15/22 CT C/A/P showing further decrease in size of pancreatic tail carcinoma with focal abutment less then 180 degrees of proximal celiac and proximal common hepatic artery by ill-defined peripancreatic low- attenuation which could represent neurovascular extent of the disease vs pancreatitis or treatment related changes.No venous enhancement involving superior mesenteric vein or mail portal vein. Similar extend of infiltrative low- attenuation which abuts the left adrenal gland and left celiac ganglion. Acute colitis involving the ascending transverse and descending colon. No evidence of metastatic disease in chest, abdomen, pelvis. \par \par She completed chemotherapy under Dr. Andrea weaver and was referred for evaluation regarding radiation treatment. Her pain has resolved and she is weaning off fentanyl patch. She has lost 20 lbs but her weight is now stable at 160 lbs. She presents today with her  mother  to discuss the role of the XRT in her care.\par \par She presents for an OTV 12/25 fx. Feeling well. Taking concurrent Capecitabine. Reports she has a lot of emotional stress. Increasing nausea she believes associated with xeloda. Reports poor appetite and poor sleep. Ativan not helping.

## 2022-03-29 NOTE — DISEASE MANAGEMENT
[Clinical] : TNM Stage: c [IIIC] : IIIC [TTNM] : 4 [NTNM] : x [MTNM] : 0 [de-identified] : 4200 [de-identified] : 7689 [de-identified] : Abdomen/Pancreas

## 2022-03-30 PROCEDURE — G6015: CPT

## 2022-03-30 PROCEDURE — G6002: CPT

## 2022-03-31 PROCEDURE — G6002: CPT

## 2022-03-31 PROCEDURE — G6015: CPT

## 2022-04-01 ENCOUNTER — OUTPATIENT (OUTPATIENT)
Dept: OUTPATIENT SERVICES | Facility: HOSPITAL | Age: 47
LOS: 1 days | Discharge: HOME | End: 2022-04-01

## 2022-04-01 DIAGNOSIS — Z78.9 OTHER SPECIFIED HEALTH STATUS: Chronic | ICD-10-CM

## 2022-04-01 DIAGNOSIS — C25.9 MALIGNANT NEOPLASM OF PANCREAS, UNSPECIFIED: ICD-10-CM

## 2022-04-01 PROCEDURE — G6015: CPT

## 2022-04-01 PROCEDURE — G6002: CPT

## 2022-04-04 ENCOUNTER — RESULT REVIEW (OUTPATIENT)
Age: 47
End: 2022-04-04

## 2022-04-04 ENCOUNTER — APPOINTMENT (OUTPATIENT)
Dept: PHYSICAL MEDICINE AND REHAB | Facility: CLINIC | Age: 47
End: 2022-04-04
Payer: COMMERCIAL

## 2022-04-04 ENCOUNTER — APPOINTMENT (OUTPATIENT)
Dept: HEMATOLOGY ONCOLOGY | Facility: CLINIC | Age: 47
End: 2022-04-04
Payer: COMMERCIAL

## 2022-04-04 ENCOUNTER — APPOINTMENT (OUTPATIENT)
Dept: INFUSION THERAPY | Facility: CLINIC | Age: 47
End: 2022-04-04

## 2022-04-04 DIAGNOSIS — Z74.09 OTHER REDUCED MOBILITY: ICD-10-CM

## 2022-04-04 DIAGNOSIS — G47.00 INSOMNIA, UNSPECIFIED: ICD-10-CM

## 2022-04-04 DIAGNOSIS — R53.83 OTHER FATIGUE: ICD-10-CM

## 2022-04-04 DIAGNOSIS — E03.9 HYPOTHYROIDISM, UNSPECIFIED: ICD-10-CM

## 2022-04-04 DIAGNOSIS — G89.3 NEOPLASM RELATED PAIN (ACUTE) (CHRONIC): ICD-10-CM

## 2022-04-04 LAB
ALBUMIN SERPL ELPH-MCNC: 4.5 G/DL — SIGNIFICANT CHANGE UP (ref 3.3–5)
ALP SERPL-CCNC: 87 U/L — SIGNIFICANT CHANGE UP (ref 40–120)
ALT FLD-CCNC: 17 U/L — SIGNIFICANT CHANGE UP (ref 10–45)
ANION GAP SERPL CALC-SCNC: 14 MMOL/L — SIGNIFICANT CHANGE UP (ref 5–17)
AST SERPL-CCNC: 24 U/L — SIGNIFICANT CHANGE UP (ref 10–40)
BASOPHILS # BLD AUTO: 0.03 K/UL — SIGNIFICANT CHANGE UP (ref 0–0.2)
BASOPHILS NFR BLD AUTO: 0.6 % — SIGNIFICANT CHANGE UP (ref 0–2)
BILIRUB SERPL-MCNC: 0.9 MG/DL — SIGNIFICANT CHANGE UP (ref 0.2–1.2)
BUN SERPL-MCNC: 12 MG/DL — SIGNIFICANT CHANGE UP (ref 7–23)
CALCIUM SERPL-MCNC: 9.2 MG/DL — SIGNIFICANT CHANGE UP (ref 8.4–10.5)
CHLORIDE SERPL-SCNC: 104 MMOL/L — SIGNIFICANT CHANGE UP (ref 96–108)
CO2 SERPL-SCNC: 21 MMOL/L — LOW (ref 22–31)
CREAT SERPL-MCNC: 0.59 MG/DL — SIGNIFICANT CHANGE UP (ref 0.5–1.3)
EGFR: 114 ML/MIN/1.73M2 — SIGNIFICANT CHANGE UP
EOSINOPHIL # BLD AUTO: 0.26 K/UL — SIGNIFICANT CHANGE UP (ref 0–0.5)
EOSINOPHIL NFR BLD AUTO: 5 % — SIGNIFICANT CHANGE UP (ref 0–6)
GLUCOSE SERPL-MCNC: 92 MG/DL — SIGNIFICANT CHANGE UP (ref 70–99)
HCT VFR BLD CALC: 41.4 % — SIGNIFICANT CHANGE UP (ref 34.5–45)
HGB BLD-MCNC: 13.5 G/DL — SIGNIFICANT CHANGE UP (ref 11.5–15.5)
IMM GRANULOCYTES NFR BLD AUTO: 0 % — SIGNIFICANT CHANGE UP (ref 0–1.5)
LYMPHOCYTES # BLD AUTO: 0.55 K/UL — LOW (ref 1–3.3)
LYMPHOCYTES # BLD AUTO: 10.6 % — LOW (ref 13–44)
MCHC RBC-ENTMCNC: 31.1 PG — SIGNIFICANT CHANGE UP (ref 27–34)
MCHC RBC-ENTMCNC: 32.6 GM/DL — SIGNIFICANT CHANGE UP (ref 32–36)
MCV RBC AUTO: 95.4 FL — SIGNIFICANT CHANGE UP (ref 80–100)
MONOCYTES # BLD AUTO: 0.58 K/UL — SIGNIFICANT CHANGE UP (ref 0–0.9)
MONOCYTES NFR BLD AUTO: 11.2 % — SIGNIFICANT CHANGE UP (ref 2–14)
NEUTROPHILS # BLD AUTO: 3.76 K/UL — SIGNIFICANT CHANGE UP (ref 1.8–7.4)
NEUTROPHILS NFR BLD AUTO: 72.6 % — SIGNIFICANT CHANGE UP (ref 43–77)
NRBC # BLD: 0 /100 WBCS — SIGNIFICANT CHANGE UP (ref 0–0)
PLATELET # BLD AUTO: 148 K/UL — LOW (ref 150–400)
POTASSIUM SERPL-MCNC: 3.9 MMOL/L — SIGNIFICANT CHANGE UP (ref 3.5–5.3)
POTASSIUM SERPL-SCNC: 3.9 MMOL/L — SIGNIFICANT CHANGE UP (ref 3.5–5.3)
PROT SERPL-MCNC: 6.9 G/DL — SIGNIFICANT CHANGE UP (ref 6–8.3)
RBC # BLD: 4.34 M/UL — SIGNIFICANT CHANGE UP (ref 3.8–5.2)
RBC # FLD: 14.6 % — HIGH (ref 10.3–14.5)
SODIUM SERPL-SCNC: 139 MMOL/L — SIGNIFICANT CHANGE UP (ref 135–145)
T4 FREE+ TSH PNL SERPL: 0.72 UIU/ML — SIGNIFICANT CHANGE UP (ref 0.27–4.2)
WBC # BLD: 5.18 K/UL — SIGNIFICANT CHANGE UP (ref 3.8–10.5)
WBC # FLD AUTO: 5.18 K/UL — SIGNIFICANT CHANGE UP (ref 3.8–10.5)

## 2022-04-04 PROCEDURE — 77427 RADIATION TX MANAGEMENT X5: CPT

## 2022-04-04 PROCEDURE — 99214 OFFICE O/P EST MOD 30 MIN: CPT

## 2022-04-04 PROCEDURE — G6002: CPT

## 2022-04-04 PROCEDURE — 99204 OFFICE O/P NEW MOD 45 MIN: CPT

## 2022-04-04 PROCEDURE — G6015: CPT

## 2022-04-04 PROCEDURE — 77336 RADIATION PHYSICS CONSULT: CPT

## 2022-04-04 RX ORDER — FLUCONAZOLE 150 MG/1
150 TABLET ORAL
Qty: 1 | Refills: 0 | Status: DISCONTINUED | COMMUNITY
Start: 2021-12-02 | End: 2022-04-04

## 2022-04-04 RX ORDER — HYDROMORPHONE HYDROCHLORIDE 2 MG/1
2 TABLET ORAL
Qty: 80 | Refills: 0 | Status: DISCONTINUED | COMMUNITY
Start: 2021-10-15 | End: 2022-04-04

## 2022-04-04 RX ORDER — FENTANYL 25 UG/H
25 PATCH, EXTENDED RELEASE TRANSDERMAL
Qty: 10 | Refills: 0 | Status: DISCONTINUED | COMMUNITY
Start: 2021-10-15 | End: 2022-04-04

## 2022-04-04 RX ORDER — FENTANYL 12 UG/H
12 PATCH, EXTENDED RELEASE TRANSDERMAL
Qty: 10 | Refills: 0 | Status: DISCONTINUED | COMMUNITY
Start: 2021-10-14 | End: 2022-04-04

## 2022-04-04 RX ORDER — ESCITALOPRAM OXALATE 5 MG/1
5 TABLET ORAL
Qty: 90 | Refills: 0 | Status: ACTIVE | COMMUNITY
Start: 2022-03-29

## 2022-04-04 RX ORDER — OMEPRAZOLE 40 MG/1
40 CAPSULE, DELAYED RELEASE ORAL
Refills: 0 | Status: ACTIVE | COMMUNITY
Start: 1900-01-01 | End: 1900-01-01

## 2022-04-04 RX ORDER — ESCITALOPRAM OXALATE 10 MG/1
10 TABLET, FILM COATED ORAL
Refills: 0 | Status: DISCONTINUED | COMMUNITY
End: 2022-04-04

## 2022-04-04 RX ORDER — NITROFURANTOIN (MONOHYDRATE/MACROCRYSTALS) 25; 75 MG/1; MG/1
100 CAPSULE ORAL
Qty: 10 | Refills: 0 | Status: DISCONTINUED | COMMUNITY
Start: 2021-12-02 | End: 2022-04-04

## 2022-04-04 RX ORDER — ONDANSETRON 4 MG/1
4 TABLET, ORALLY DISINTEGRATING ORAL
Qty: 90 | Refills: 0 | Status: DISCONTINUED | COMMUNITY
Start: 2021-10-15 | End: 2022-04-04

## 2022-04-04 RX ORDER — PANTOPRAZOLE 40 MG/1
40 TABLET, DELAYED RELEASE ORAL
Refills: 0 | Status: DISCONTINUED | COMMUNITY
End: 2022-04-04

## 2022-04-04 RX ORDER — METRONIDAZOLE 500 MG/1
500 TABLET ORAL
Qty: 14 | Refills: 0 | Status: DISCONTINUED | COMMUNITY
Start: 2022-03-02 | End: 2022-04-04

## 2022-04-04 RX ORDER — LEVOTHYROXINE SODIUM 0.14 MG/1
137 TABLET ORAL
Qty: 90 | Refills: 0 | Status: ACTIVE | COMMUNITY
Start: 2021-09-17

## 2022-04-04 NOTE — REVIEW OF SYSTEMS
[Patient Intake Form Reviewed] : Patient intake form was reviewed [Fatigue] : fatigue [Negative] : Allergic/Immunologic [FreeTextEntry7] : Loss of appetite, nausea - denies any diarrhea [de-identified] : Fingertips feel "burnt" [de-identified] : Difficulty sleeping - will wake up and be unable to get back to sleep.

## 2022-04-04 NOTE — HISTORY OF PRESENT ILLNESS
[FreeTextEntry1] : Ms. Garcia is a 46 year old female BRCA 2 mutation carrier with clinical stage III pancreatic adenocarcinoma.10/19/21- Started on NEOAdjuvant FOLFIRINOX.  Currently on capecitabine and radiation therapy.  Planning for surgical resection with Dr. Howell.  \par \par Her biggest complaint is her fatigue.  She reports that she is having severe fatigue.  She states that she lies in bed most of the day.  She also reports having significant insomnia problems.  She is tried multiple medications which have not been effective for her insomnia.  She also reports poor appetite.  She is recently trialed medical cannabis.  Denies any focal weakness.  Denies any numbness or tingling.  States her pain is relatively controlled.  Occasionally taking hydromorphone as needed for pain.\par

## 2022-04-04 NOTE — PHYSICAL EXAM
[Normal] : affect appropriate [de-identified] : anicteric [de-identified] : no c/c/e [de-identified] : fingertips slightly red - no bruises or blisters.  Skin looks hydrated

## 2022-04-04 NOTE — RESULTS/DATA
[FreeTextEntry1] : WBC: 5.18,  ANC:3.76,  Hgb: 13.5,  Hct: 41.4,  MCV: 95.4,  Plts: 148\par CMP: pending

## 2022-04-04 NOTE — REASON FOR VISIT
[Follow-Up Visit] : a follow-up [FreeTextEntry2] : Pancreatic Cancer - D16/~30 fractions XRT+ Capecitabine

## 2022-04-04 NOTE — ASSESSMENT
[FreeTextEntry1] : Patient is a 46 y.o. with a clinical stage III pancreatic tail cancer (T4).\par 10/19/21- 2/8/22 -  NEOAdjuvant FOLFIRINOX x 8.\par Excellent response on f/u scans.\par Patient now undergoing NEOAdjuvant XRT with Xeloda to further improve resectability.  \par 3/14/22 - Started XRT and Xeloda 1300mg BID. \par Patient now with severe fatigue, anorexia, mild nausea, sleep difficulties, and mild HFS. \par \par Plan:\par 1. Fatigue - Dr. Cobian recommended patient increase physical activity.  Rationale reviewed with patient - this should also help her to sleep better.  Given support as this still seems overwhelming. \par TSH level sent today to see if this can be affecting her fatigue. \par Of note patient has also been in menopause since chemo - likely also affecting her sleeping habits. \par 2. Nausea - Despite instructions patient tried Xeloda without eating 1st -> became nauseous.  Reviewed small meals that she can take to help minimize this. \par 3. Pain - Patient has now weaned of Fentanyl patches.  Reports no longer needs narcotics. \par 4. Onc - Patient with burning in her fingertips - although nothing much visually this is early HFS.  She was told can reduce dose minimally by (1) 150mg tab a day. Reinforced need to avoid applying a lot of pressure to the hands and need for frequent moisturizing.  If this persists/worsens then she needs to call.\par \par ETHEL GARCÍA \par Akash Cobian\par Lourdes Tobar\par Juni Howell

## 2022-04-04 NOTE — HISTORY OF PRESENT ILLNESS
[Disease: _____________________] : Disease: [unfilled] [de-identified] : BOUCHRA PALACIOS is a 45 y.o. with a PMH significant for hypothyroidism, who we are following for a BRCA2+, clinical stage III pancreatic tail cancer.\par \par ~8/2021 - Presented at age 45 with abdominal pains.  Saw PCP, referred to GI Dr. Maldonado. \par 9/30/21 - Abd US - 3.9 x 4.6 x 4.8cm heterogeneous mass in region of pancreatic tail. \par 10/7/21 - CT A/P - pancreatic tail mass with splenic artery encasement and splenic vein thrombosis ( tumor thrombus).  Borderline PALN's. Subsequent CT Chest no evidence of disease.\par 10/11/21 - 10/15/21 - Admit to  for severe pain.  Was set up for outpatient EGD with biopsy but had severe pains, nausea, dry-heaving and so went to ER. \par 10/11/21 - MRCP - tumor encases splenic artery, celiac artery contact <180, splenic vein occlusion.  Possible retroperitoneal extension with left adrenal gland contact and possible extension into left celiac ganglion.\par 10/12/21 - EUS, FNA pancreatic mass - scanty specimen - PATH - poorly differentiated carcinoma (specimen too small to further classify). \par 10/14/21 - Celiac block attempted - not successful - no window due to tumor overlying plexus. \par Case presented at tumor board - for NEOAdjuvant FOLFIRINOX up to 12 cycles, reassess after 4.  Consider chemoRT if suboptimal response. \par Dr. Howell - surgeon. \par \par 10/19/21 - Started FOLFIRINOX.  Had a 1 week dose delay prior to C#4 for neutropenia and prior to C#6 due to COVID in 12/2021.\par \par 12/13/21 - CT C/A/P  (post 4 cycles of FOLFIRINOX) - pancreatic mass smaller ( 4.5 from 5.8 cm) but persistent vascular encasement and now with complete occlusion of splenic artery and large splenic infarct). \par \par 2/15/22 - CT C/A/P  (post 8 cycles of  FOLFIRINOX) - further decrease in pancreatic mass, no new areas of disease.\par Plan now is for chemo/RT with Xeloda, then surgery. \par \par 3/14/22 - Started XRT x 6 weeks with Xeloda (Lourdes Tobar) [de-identified] : poorly differentiated carcinoma [de-identified] : 10/18/21 - INVITAE Comprehensive genetic panel - BRCA2+\par 10/22/21 - UGT1A1 - homo TA7 (*28) [de-identified] : Today #16/ 30 fractions XRT.  Her major complaint has been severe fatigue, although she admits she still has issues with sleeping and is not sleeping well.  She only sleeps for short periods of time - keeps waking up.  She saw Dr. Cobian for this issue this morning - he recommended physical therapy.  Patient is not happy with this as it seems so overwhelming right now. \par She mentions she is having a burning feeling on her fingertips, especially her thumb and index fingers.  It is different from neuropathy.  This started the middle of last week.  Feet have no issues. \par She has no appetite and does admit to nausea.  Despite instructions, she tried taking Xeloda on an empty stomach as she didn't feel like eating and then felt very nauseous after.  Has now "worked it out" - eating the minimal amount to prevent nausea. \par Denies any problems with diarrhea. \par Denies any other changes in her family, medical, or social history since her last visit of 3/4/22.

## 2022-04-04 NOTE — ASSESSMENT
[FreeTextEntry1] : 46 year old female presenting for evaluation.\par \par #Cancer-related fatigue syndrome\par -Agree with checking TSH.\par -CBC reviewed\par -Start supervised exercise program with physical therapy.  Focus on increasing aerobic endurance as well as low resistance training to prepare patient for upcoming surgery.\par -Case discussed with RODOLFO Kelley regarding these recommendations.  Continue to closely monitor platelet counts while in rehabilitation.\par \par #Decreased appetite\par -Continue medical cannabis\par \par #Insomnia\par -Patient counseled on sleep hygiene and will trial meditation\par -Continue melatonin as needed\par \par Follow-up in 4 weeks.

## 2022-04-04 NOTE — PHYSICAL EXAM
[FreeTextEntry1] : Gen: Patient is A&O x 3, NAD\par HEENT: EOMI, hearing grossly normal\par Resp: regular, non - labored\par CV: pulses regular\par Skin: no rashes, erythema\par Lymph: no clubbing, cyanosis, edema, or palpable lymphadenopathy\par Inspection: no instability or misalignment\par ROM: full throughout\par Palpation:no tenderness to palpation in abdomen \par Sensation: intact to light touch\par Reflexes: 1+ and symmetric throughout\par Strength: 5/5 throughout\par Special tests: -Rick's sign \par Gait: normal, non-antalgic\par \par

## 2022-04-05 ENCOUNTER — NON-APPOINTMENT (OUTPATIENT)
Age: 47
End: 2022-04-05

## 2022-04-05 VITALS
BODY MASS INDEX: 27.14 KG/M2 | HEIGHT: 64 IN | RESPIRATION RATE: 18 BRPM | SYSTOLIC BLOOD PRESSURE: 120 MMHG | HEART RATE: 59 BPM | OXYGEN SATURATION: 99 % | WEIGHT: 159 LBS | DIASTOLIC BLOOD PRESSURE: 72 MMHG

## 2022-04-05 PROCEDURE — G6002: CPT

## 2022-04-05 PROCEDURE — G6015: CPT

## 2022-04-05 NOTE — DISEASE MANAGEMENT
[Clinical] : TNM Stage: c [IIIC] : IIIC [TTNM] : 4 [NTNM] : x [MTNM] : 0 [de-identified] : 1219 [de-identified] : 9396 [de-identified] : Abdomen/Pancreas

## 2022-04-05 NOTE — HISTORY OF PRESENT ILLNESS
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  Saw PCP, referred to GI Dr. Maldonado. \par 9/30/21 - Abd US - 3.9 x 4.6 x 4.8cm heterogeneous mass in region of pancreatic tail. \par 10/7/21 - CT A/P - pancreatic tail mass with splenic artery encasement and splenic vein thrombosis.  Borderline PALN's. \par 10/11/21 - 10/15/21 - Admit to  for severe pain.  Was set up for outpatient EGD with biopsy but had severe pains, nausea, dry-heaving and so went to ER. \par 10/11/21 - CT Chest - no evidence of disease.\par 10/11/21 - MRCP - tumor encases splenic artery, celiac artery contact <180, splenic vein occlusion.  Possible retroperitoneal extension with left adrenal gland contact and possible extension into left celiac ganglion.\par 10/12/21 - EUS, FNA pancreatic mass - scanty specimen - PATH - poorly differentiated carcinoma. \par 10/14/21 - Celiac block attempted - not successful - no window due to tumor overlying plexus. 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 \par \par 10/18/21 - [facility] Comprehensive genetic panel - BRCA2 +\par 10/22/21 - UGT1A1 - homo TA7 (*28)\par \par 10/19/21 - Started FOLFIRINOX\par Patient with 1 week dose delay prior to C#4 for neutropenia and prior to C#6 due to COVID in December 2021\par \par 12/13/21 - CT C/A/P (after 4 cycles) - Pancreatic mass now 4.5 x 2.9cm (was 5.8 x 4.9cm).  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  New 2.3 x 1.2 medial subcapsular collection. ? seroma. Encasement of left gastric artery and celiac axis.  Splenic vein occlusion. \par She had COVID at the end of 12/21 with severe diarrhea and nausea and received antibody therapy and recovered.\par \par 2/15/22 CT C/A/P showing further decrease in size of pancreatic tail carcinoma with focal abutment less then 180 degrees of proximal celiac and proximal common hepatic artery by ill-defined peripancreatic low- attenuation which could represent neurovascular extent of the disease vs pancreatitis or treatment related changes.No venous enhancement involving superior mesenteric vein or mail portal vein. Similar extend of infiltrative low- attenuation which abuts the left adrenal gland and left celiac ganglion. Acute colitis involving the ascending transverse and descending colon. No evidence of metastatic disease in chest, abdomen, pelvis. \par \par She completed chemotherapy under Dr. Mendez care and was referred for evaluation regarding radiation treatment. Her pain has resolved and she is weaning off fentanyl patch. She has lost 20 lbs but her weight is now stable at 160 lbs. She presents today with her  mother  to discuss the role of the XRT in her care.\par \par She presents for an OTV 17/25 fx. Taking concurrent Capecitabine bid. Reports she has a lot of emotional stress. Minimal nausea. Reports low appetite and poor sleep. States medical cannabis helps nausea and her sleep somewhat but at too high dose she feels jittery.  She saw Dr. Cobian who advised PT BID and more physical activity during the day. She is maintaining her weight.Her capecitabine dose was reduced slightly this week to 1150 mg bid (from 1300 mg) due to painful fingertip neuropathy.

## 2022-04-06 DIAGNOSIS — Z79.899 OTHER LONG TERM (CURRENT) DRUG THERAPY: ICD-10-CM

## 2022-04-06 DIAGNOSIS — E03.9 HYPOTHYROIDISM, UNSPECIFIED: ICD-10-CM

## 2022-04-06 DIAGNOSIS — L27.1 LOCALIZED SKIN ERUPTION DUE TO DRUGS AND MEDICAMENTS TAKEN INTERNALLY: ICD-10-CM

## 2022-04-06 DIAGNOSIS — R53.83 OTHER FATIGUE: ICD-10-CM

## 2022-04-06 DIAGNOSIS — R11.0 NAUSEA: ICD-10-CM

## 2022-04-06 DIAGNOSIS — R43.2 PARAGEUSIA: ICD-10-CM

## 2022-04-06 PROCEDURE — G6015: CPT

## 2022-04-06 PROCEDURE — G6002: CPT

## 2022-04-07 PROCEDURE — G6015: CPT

## 2022-04-07 PROCEDURE — G6002: CPT

## 2022-04-08 PROCEDURE — G6015: CPT

## 2022-04-08 PROCEDURE — G6002: CPT

## 2022-04-11 PROCEDURE — G6002: CPT

## 2022-04-11 PROCEDURE — 77427 RADIATION TX MANAGEMENT X5: CPT

## 2022-04-11 PROCEDURE — G6015: CPT

## 2022-04-11 PROCEDURE — 77336 RADIATION PHYSICS CONSULT: CPT

## 2022-04-12 ENCOUNTER — NON-APPOINTMENT (OUTPATIENT)
Age: 47
End: 2022-04-12

## 2022-04-12 PROCEDURE — G6002: CPT

## 2022-04-12 PROCEDURE — G6015: CPT

## 2022-04-12 NOTE — VITALS
[Maximal Pain Intensity: 2/10] : 2/10 [Least Pain Intensity: 0/10] : 0/10 [70: Cares for self; unalbe to carry on normal activity or do active work.] : 70: Cares for self; unable to carry on normal activity or do active work.

## 2022-04-12 NOTE — DISEASE MANAGEMENT
[Clinical] : TNM Stage: c [IIIC] : IIIC [TTNM] : 4 [NTNM] : x [MTNM] : 0 [de-identified] : 3346 [de-identified] : 7958 [de-identified] : Abdomen/Pancreas

## 2022-04-12 NOTE — HISTORY OF PRESENT ILLNESS
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  Saw PCP, referred to GI Dr. Maldonado. \par 9/30/21 - Abd US - 3.9 x 4.6 x 4.8cm heterogeneous mass in region of pancreatic tail. \par 10/7/21 - CT A/P - pancreatic tail mass with splenic artery encasement and splenic vein thrombosis.  Borderline PALN's. \par 10/11/21 - 10/15/21 - Admit to  for severe pain.  Was set up for outpatient EGD with biopsy but had severe pains, nausea, dry-heaving and so went to ER. \par 10/11/21 - CT Chest - no evidence of disease.\par 10/11/21 - MRCP - tumor encases splenic artery, celiac artery contact <180, splenic vein occlusion.  Possible retroperitoneal extension with left adrenal gland contact and possible extension into left celiac ganglion.\par 10/12/21 - EUS, FNA pancreatic mass - scanty specimen - PATH - poorly differentiated carcinoma. \par 10/14/21 - Celiac block attempted - not successful - no window due to tumor overlying plexus. 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 \par \par 10/18/21 - [facility] Comprehensive genetic panel - BRCA2 +\par 10/22/21 - UGT1A1 - homo TA7 (*28)\par \par 10/19/21 - Started FOLFIRINOX\par Patient with 1 week dose delay prior to C#4 for neutropenia and prior to C#6 due to COVID in December 2021\par \par 12/13/21 - CT C/A/P (after 4 cycles) - Pancreatic mass now 4.5 x 2.9cm (was 5.8 x 4.9cm).  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  New 2.3 x 1.2 medial subcapsular collection. ? seroma. Encasement of left gastric artery and celiac axis.  Splenic vein occlusion. \par She had COVID at the end of 12/21 with severe diarrhea and nausea and received antibody therapy and recovered.\par \par 2/15/22 CT C/A/P showing further decrease in size of pancreatic tail carcinoma with focal abutment less then 180 degrees of proximal celiac and proximal common hepatic artery by ill-defined peripancreatic low- attenuation which could represent neurovascular extent of the disease vs pancreatitis or treatment related changes.No venous enhancement involving superior mesenteric vein or mail portal vein. Similar extend of infiltrative low- attenuation which abuts the left adrenal gland and left celiac ganglion. Acute colitis involving the ascending transverse and descending colon. No evidence of metastatic disease in chest, abdomen, pelvis. \par \par She completed chemotherapy under Dr. Mendez care and was referred for evaluation regarding radiation treatment. Her pain has resolved and she is weaning off fentanyl patch. She has lost 20 lbs but her weight is now stable at 160 lbs. She presents today with her  mother  to discuss the role of the XRT in her care.\par \par She presents for an OTV 21/25 fx. Taking concurrent Capecitabine bid. Reports she has a lot of emotional stress. Minimal nausea. Reports low appetite and poor sleep. States medical cannabis helps nausea and her sleep somewhat but at too high dose she feels jittery.  She saw Dr. Cobian who advised PT BID and more physical activity during the day. She is maintaining her weight.Her capecitabine dose was reduced slightly this week to 1150 mg bid (from 1300 mg) due to painful fingertip neuropathy. She has a cold today, no fever, no cough. Sleeping better. She reports her neuropathy has worsened in both her hands and feet.

## 2022-04-13 PROCEDURE — G6015: CPT

## 2022-04-13 PROCEDURE — G6002: CPT

## 2022-04-14 PROCEDURE — G6002: CPT

## 2022-04-14 PROCEDURE — G6015: CPT

## 2022-04-15 PROCEDURE — G6002: CPT

## 2022-04-15 PROCEDURE — G6015: CPT

## 2022-04-18 ENCOUNTER — RESULT REVIEW (OUTPATIENT)
Age: 47
End: 2022-04-18

## 2022-04-18 ENCOUNTER — APPOINTMENT (OUTPATIENT)
Dept: HEMATOLOGY ONCOLOGY | Facility: CLINIC | Age: 47
End: 2022-04-18
Payer: COMMERCIAL

## 2022-04-18 ENCOUNTER — APPOINTMENT (OUTPATIENT)
Dept: INFUSION THERAPY | Facility: CLINIC | Age: 47
End: 2022-04-18
Payer: COMMERCIAL

## 2022-04-18 ENCOUNTER — RX RENEWAL (OUTPATIENT)
Age: 47
End: 2022-04-18

## 2022-04-18 VITALS
OXYGEN SATURATION: 98 % | BODY MASS INDEX: 27.29 KG/M2 | WEIGHT: 159 LBS | SYSTOLIC BLOOD PRESSURE: 123 MMHG | RESPIRATION RATE: 18 BRPM | HEART RATE: 82 BPM | DIASTOLIC BLOOD PRESSURE: 72 MMHG | TEMPERATURE: 98.1 F

## 2022-04-18 DIAGNOSIS — T45.1X5A OTHER FATIGUE: ICD-10-CM

## 2022-04-18 DIAGNOSIS — R43.2 PARAGEUSIA: ICD-10-CM

## 2022-04-18 DIAGNOSIS — Z87.898 PERSONAL HISTORY OF OTHER SPECIFIED CONDITIONS: ICD-10-CM

## 2022-04-18 DIAGNOSIS — R53.83 OTHER FATIGUE: ICD-10-CM

## 2022-04-18 DIAGNOSIS — R63.0 ANOREXIA: ICD-10-CM

## 2022-04-18 DIAGNOSIS — G62.0 DRUG-INDUCED POLYNEUROPATHY: ICD-10-CM

## 2022-04-18 DIAGNOSIS — J03.90 ACUTE TONSILLITIS, UNSPECIFIED: ICD-10-CM

## 2022-04-18 LAB
BASOPHILS # BLD AUTO: 0.03 K/UL — SIGNIFICANT CHANGE UP (ref 0–0.2)
BASOPHILS NFR BLD AUTO: 0.7 % — SIGNIFICANT CHANGE UP (ref 0–2)
EOSINOPHIL # BLD AUTO: 0.16 K/UL — SIGNIFICANT CHANGE UP (ref 0–0.5)
EOSINOPHIL NFR BLD AUTO: 3.6 % — SIGNIFICANT CHANGE UP (ref 0–6)
HCT VFR BLD CALC: 40.4 % — SIGNIFICANT CHANGE UP (ref 34.5–45)
HGB BLD-MCNC: 13.6 G/DL — SIGNIFICANT CHANGE UP (ref 11.5–15.5)
IMM GRANULOCYTES NFR BLD AUTO: 0.2 % — SIGNIFICANT CHANGE UP (ref 0–1.5)
LYMPHOCYTES # BLD AUTO: 0.41 K/UL — LOW (ref 1–3.3)
LYMPHOCYTES # BLD AUTO: 9.3 % — LOW (ref 13–44)
MCHC RBC-ENTMCNC: 32.2 PG — SIGNIFICANT CHANGE UP (ref 27–34)
MCHC RBC-ENTMCNC: 33.7 GM/DL — SIGNIFICANT CHANGE UP (ref 32–36)
MCV RBC AUTO: 95.5 FL — SIGNIFICANT CHANGE UP (ref 80–100)
MONOCYTES # BLD AUTO: 0.49 K/UL — SIGNIFICANT CHANGE UP (ref 0–0.9)
MONOCYTES NFR BLD AUTO: 11.2 % — SIGNIFICANT CHANGE UP (ref 2–14)
NEUTROPHILS # BLD AUTO: 3.29 K/UL — SIGNIFICANT CHANGE UP (ref 1.8–7.4)
NEUTROPHILS NFR BLD AUTO: 75 % — SIGNIFICANT CHANGE UP (ref 43–77)
NRBC # BLD: 0 /100 WBCS — SIGNIFICANT CHANGE UP (ref 0–0)
PLATELET # BLD AUTO: 147 K/UL — LOW (ref 150–400)
RBC # BLD: 4.23 M/UL — SIGNIFICANT CHANGE UP (ref 3.8–5.2)
RBC # FLD: 15.1 % — HIGH (ref 10.3–14.5)
WBC # BLD: 4.39 K/UL — SIGNIFICANT CHANGE UP (ref 3.8–10.5)
WBC # FLD AUTO: 4.39 K/UL — SIGNIFICANT CHANGE UP (ref 3.8–10.5)

## 2022-04-18 PROCEDURE — 77336 RADIATION PHYSICS CONSULT: CPT

## 2022-04-18 PROCEDURE — G6002: CPT

## 2022-04-18 PROCEDURE — 77427 RADIATION TX MANAGEMENT X5: CPT

## 2022-04-18 PROCEDURE — 99213 OFFICE O/P EST LOW 20 MIN: CPT

## 2022-04-18 PROCEDURE — G6015: CPT

## 2022-04-18 NOTE — REASON FOR VISIT
[Follow-Up Visit] : a follow-up [FreeTextEntry2] : Pancreatic Cancer - D26/~25 fractions XRT+ Capecitabine

## 2022-04-18 NOTE — REVIEW OF SYSTEMS
[Patient Intake Form Reviewed] : Patient intake form was reviewed [Fatigue] : fatigue [Negative] : Allergic/Immunologic [FreeTextEntry2] : improving [FreeTextEntry7] : Loss of appetite - getting better; denies any diarrhea [de-identified] : Fingers and toes/ball of foot now with neuropathies [de-identified] : Difficulty sleeping - improving

## 2022-04-18 NOTE — PHYSICAL EXAM
[Normal] : affect appropriate [de-identified] : anicteric [de-identified] : no c/c/e [de-identified] : fingertips slightly red - no bruises or blisters.  Skin looks hydrated

## 2022-04-18 NOTE — HISTORY OF PRESENT ILLNESS
[Disease: _____________________] : Disease: [unfilled] [de-identified] : BOUCHRA PALACIOS is a 45 y.o. with a PMH significant for hypothyroidism, who we are following for a BRCA2+, clinical stage III pancreatic tail cancer.\par \par ~8/2021 - Presented at age 45 with abdominal pains.  Saw PCP, referred to GI Dr. Maldonado. \par 9/30/21 - Abd US - 3.9 x 4.6 x 4.8cm heterogeneous mass in region of pancreatic tail. \par 10/7/21 - CT A/P - pancreatic tail mass with splenic artery encasement and splenic vein thrombosis ( tumor thrombus).  Borderline PALN's. Subsequent CT Chest no evidence of disease.\par 10/11/21 - 10/15/21 - Admit to  for severe pain.  Was set up for outpatient EGD with biopsy but had severe pains, nausea, dry-heaving and so went to ER. \par 10/11/21 - MRCP - tumor encases splenic artery, celiac artery contact <180, splenic vein occlusion.  Possible retroperitoneal extension with left adrenal gland contact and possible extension into left celiac ganglion.\par 10/12/21 - EUS, FNA pancreatic mass - scanty specimen - PATH - poorly differentiated carcinoma (specimen too small to further classify). \par 10/14/21 - Celiac block attempted - not successful - no window due to tumor overlying plexus. \par Case presented at tumor board - for NEOAdjuvant FOLFIRINOX up to 12 cycles, reassess after 4.  Consider chemoRT if suboptimal response. \par Dr. Howell - surgeon. \par \par 10/19/21 - Started FOLFIRINOX.  Had a 1 week dose delay prior to C#4 for neutropenia and prior to C#6 due to COVID in 12/2021.\par \par 12/13/21 - CT C/A/P  (post 4 cycles of FOLFIRINOX) - pancreatic mass smaller ( 4.5 from 5.8 cm) but persistent vascular encasement and now with complete occlusion of splenic artery and large splenic infarct). \par \par 2/15/22 - CT C/A/P  (post 8 cycles of  FOLFIRINOX) - further decrease in pancreatic mass, no new areas of disease.\par Plan now is for chemo/RT with Xeloda, then surgery. \par \par 3/14/22 - Started XRT x 5 weeks with Xeloda (Lourdes Tobar) [de-identified] : poorly differentiated carcinoma [de-identified] : 10/18/21 - [facility] Comprehensive genetic panel - BRCA2+\par 10/22/21 - UGT1A1 - homo TA7 (*28) [de-identified] : Today #26/ 25 fractions XRT.  (Today last day - had to  make up a day when machine was down). \par Major complaint is neuropathies - thumbs, index, and middle fingers extremely numb and she also has some in her toes to the ball of her feet.  This started last weekend and is pretty severe.  She states she did not have issues with this during chemo, just with the Xeloda.  She spoke to Dr. Tobar about this last Tuesday and was told to hold the last 4 doses. \par Since stopping the Xeloda she has not noticed any changes in the neuropathies, but she has noticed an improvement in the fatigue.  She had seen Dr. Cobian and the recommendation was to become more active.  She tried PT once but was not really into it.  She has been making a major effort to walk at least 10 minutes a day now.  Not sure if this is helping the fatigue or the fact that she stopped the Xeloda. \par Her sleep issues and loss of appetite are also improving.  \par \par Denies any other changes in her family, medical, or social history since her last visit of 4/4/22.

## 2022-04-18 NOTE — ASSESSMENT
[FreeTextEntry1] : Patient is a 46 y.o. with a clinical stage III pancreatic tail cancer (T4).\par 10/19/21- 2/8/22 -  NEOAdjuvant FOLFIRINOX x 8.\par Excellent response on f/u scans.\par Patient now undergoing NEOAdjuvant XRT with Xeloda to further improve resectability.  \par 3/14/22 - Started XRT and Xeloda 1300mg BID. \par 4/4/22 - Xeloda decreased to 1150mg due to mild burning in fingertips. \par 4/12/22 -Xeloda was stopped due to neuropathies. \par 4/18/22 - XRT completed. \par \par Plan:\par 1. Fatigue -  Improving off Xeloda and with 10 minutes daily walking.  \par 2. Neuropathies - rare with Xeloda but they have been reported.  Distinct from altered skin sensations from HFS.  Xeloda was held for last 4 days of RT.  \par 3. Onc - Patient overall has been feeling better since stopping Xeloda. \par Plan now is to f/u with surgeon Dr. Howell.  Appears surgery tentatively set up for 5/27/22.  \par Patient instructed to f/u ~ 2 weeks later with Dr. Mendez to review pathology report. \par \par ETHEL GARCÍA \par Akash Cobian\par Lourdes Tobar\par Juni Howell

## 2022-04-28 ENCOUNTER — APPOINTMENT (OUTPATIENT)
Dept: SURGICAL ONCOLOGY | Facility: CLINIC | Age: 47
End: 2022-04-28
Payer: COMMERCIAL

## 2022-04-28 PROCEDURE — 99215 OFFICE O/P EST HI 40 MIN: CPT

## 2022-04-28 NOTE — HISTORY OF PRESENT ILLNESS
[de-identified] : Ms. BOUCHRA PALACIOS  is a 46 year  old female  presenting for a follow up visit. \par \par HOSPITAL COURSE @ [facility] 10/11/2021-10/15/2021: \par 45 year old female with a PMHx of hypothyroidism and depression presented to the [facility] ED on 10/11 for abdominal pain that had lasted for a month. Prior to presentation patient had been worked up by her gastroenterologist outpatient who had ordered an abdominal ultrasound. Ultrasound at that time showed a pancreatic mass. Subsequent CT abd/pelvis showed a pancreatic tail mass with splenic encasement. Patient was scheduled for a EGD with biopsy with Dr. Campbell however patient was in acute distress and presented to ED on 10/10. Gastroenterology was consulted and patient was seen by Dr. Campbell who performed a EUS FNB. 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 Hem/Onc was consulted and patient was evaluated with Dr. Mendez who did not recommend anti-coagulation because the thrombosis was a direct result of the tumor encasement. Patient was also seen by Dr. Howell Surgical Oncology who plans to resect the mass. Patient case was presented at a GI Tumour board and a 12 cycle course of Folfirinox prior to surgical resection was planned. CXR and CT Abdomen indicated no metastatic disease to the lungs or liver. During hospitalization a nerve block of the celiac plexus was attempted by Adrian but was unsuccessful due to risk of puncturing and seeding the pancreatic mass. Patient's pain was controlled via IV Dilaudid and a Fentanyl patch. Patient \par will continue to take PO Dilaudid and apply the fentanyl patch. Patient also underwent a procedure with Interventional Radiology to implant a chemotherapy port. Patient will see Dr. Mendez outpatient on 10/18/21 and will begin chemotherapy 10/19/21. \par \par Pancreas tail biopsy 10/11/2021- PATH: Poorly differentiated carcinoma\par \par Pts paternal aunt underwent genetic testing and was found to be BRCA 2+.  Pt. then decided to pursue genetic Testing in 2021 which revealed she is a BRCA 2+ gene carrier.  She met with Dr. Vera (Genetic counselor) and will undergo more extensive genetic testing. Results pending. \par \par PMH: Depression, hypothyroidism, BRCA 2+, \par PSH:  EGD/EUS\par Social Hx: Never a smoker, social alcohol use\par \par INTERVAL HISTORY:\par 10/21/2021- Pt. was started on neoadjuvant chemotherapy with FOLFIRINOX on 10/19/2021.  Having nausea and diarrhea.  Also pain in back with poor pain control with narcotic medication.\par \par 21 Ca 19-9 28 U/mL\par \par CT C/A/P performed on 21 revealed Pancreatic tail mass decreased in size since 10/11/2021. Apparent new occlusion splenic artery with extensive splenic infarction. Encasement left gastric artery and celiac axis. Splenic vein occlusion.\par \par 21- Pt cont. on Folfirinox. Today she is feeling pretty well.  She has had some tough symptoms which have been managed appropriately.  She is overall holding up well.\par \par 2022- Pt. testing positive for COVID in 2021. On chemo under the care of Dr. Mendez.  Re-staging CT C/A/P performed on 2/15/2022 showed further decrease in size of the pancreatic tail carcinoma as described with focal abutment less than 180 degrees of proximal celiac artery and proximal common hepatic artery by ill-defined peripancreatic low-attenuation which could represent neurovascular extent of disease versus pancreatitis or treatment-related changes. No venous encasement involving superior mesenteric vein or main portal vein. Similar extent of infiltrative low-attenuation which abuts the left adrenal gland and left celiac ganglion. Acute colitis involving the ascending transverse and descending colon.  No imaging evidence of metastatic disease involving the chest, abdomen or pelvis. \par \par 3/14/2022- Started XRT x 5 weeks with Xeloda (w/ Lourdes Tobar)\par \par 2022- Xeloda was stopped due to neuropathies\par \par 22- XRT completed \par \par

## 2022-04-28 NOTE — CONSULT LETTER
[Dear  ___] : Dear  [unfilled], [Courtesy Letter:] : I had the pleasure of seeing your patient, [unfilled], in my office today. [Consult Closing:] : Thank you very much for allowing me to participate in the care of this patient.  If you have any questions, please do not hesitate to contact me. [Sincerely,] : Sincerely, [DrVidhi  ___] : Dr. MENDEZ [DrVidhi ___] : Dr. MNEDEZ [FreeTextEntry3] : Juni Howell MD, MPH, FACS, FSSO\par , [facility] General Surgical Oncology Fellowship\par [facility] [place]\par Associate Professor of Surgery\par [employer] [facility]

## 2022-04-28 NOTE — PHYSICAL EXAM
[Normal] : supple, no neck mass and thyroid not enlarged [Normal Neck Lymph Nodes] : normal neck lymph nodes  [Normal Supraclavicular Lymph Nodes] : normal supraclavicular lymph nodes [Normal Groin Lymph Nodes] : normal groin lymph nodes [Normal Axillary Lymph Nodes] : normal axillary lymph nodes [Normal] : oriented to person, place and time, with appropriate affect [de-identified] : soft NT ND

## 2022-04-28 NOTE — ASSESSMENT
[FreeTextEntry1] : Ms. BOUCHRA PALACIOS  is a 46 year old female with PMHx hypothyroid, depression and BRCA2+, recently diagnosed with clinical stage III pancreatic tail cancer. Pt. was started on neoadjuvant FOLFIRINOX on 10/19/2021.  Pt. will continue for up to 12 cycles, restage after 4th cycle. \par \par CT C/A/P performed on 12/13/21 revealed Pancreatic tail mass decreased in size since 10/11/2021. Apparent new occlusion splenic artery with extensive splenic infarction. Encasement left gastric artery and celiac axis. Splenic vein occlusion.\par \par -Pt. testing positive for COVID in 12/2021. \par -On chemo under the care of Dr. Mendez.  \par -Re-staging CT C/A/P performed on 2/15/2022 showed further decrease in size of the pancreatic tail carcinoma as described with focal abutment less than 180 degrees of proximal celiac artery and proximal common hepatic artery by ill-defined peripancreatic low-attenuation which could represent neurovascular extent of disease versus pancreatitis or treatment-related changes. No venous encasement involving superior mesenteric vein or main portal vein. Similar extent of infiltrative low-attenuation which abuts the left adrenal gland and left celiac ganglion. Acute colitis involving the ascending transverse and descending colon.  No imaging evidence of metastatic disease involving the chest, abdomen or pelvis. \par \par 3/14/2022- Started XRT x 5 weeks with Xeloda (w/ oLurdes Tobar)\par 4/12/2022- Xeloda was stopped due to neuropathies\par 4/18/22- XRT completed \par \par PLAN:\par 1) Tentative surgery date of 5/27/2022. We discussed the risks, benefits, and alternatives of the procedure with the patient, she expressed understanding and agree to proceed with diagnostic laparoscopy, distal pancreatectomy, left adrenalectomy, splenectomy, possible Modified Carlos procedure with resection of the celiac axis in order to clear the disease.\par 2) Repeat CT scan prior to surgery

## 2022-05-02 ENCOUNTER — APPOINTMENT (OUTPATIENT)
Dept: PHYSICAL MEDICINE AND REHAB | Facility: CLINIC | Age: 47
End: 2022-05-02

## 2022-05-10 ENCOUNTER — APPOINTMENT (OUTPATIENT)
Dept: SURGICAL ONCOLOGY | Facility: CLINIC | Age: 47
End: 2022-05-10
Payer: COMMERCIAL

## 2022-05-10 VITALS
WEIGHT: 159 LBS | BODY MASS INDEX: 27.14 KG/M2 | HEART RATE: 69 BPM | SYSTOLIC BLOOD PRESSURE: 112 MMHG | TEMPERATURE: 98.3 F | HEIGHT: 64 IN | RESPIRATION RATE: 15 BRPM | DIASTOLIC BLOOD PRESSURE: 78 MMHG | OXYGEN SATURATION: 95 %

## 2022-05-10 PROCEDURE — 90620 MENB-4C VACCINE IM: CPT

## 2022-05-10 PROCEDURE — 90471 IMMUNIZATION ADMIN: CPT

## 2022-05-10 PROCEDURE — 90647 HIB PRP-OMP VACC 3 DOSE IM: CPT

## 2022-05-10 PROCEDURE — 90472 IMMUNIZATION ADMIN EACH ADD: CPT

## 2022-05-10 PROCEDURE — 90734 MENACWYD/MENACWYCRM VACC IM: CPT

## 2022-05-10 NOTE — HISTORY OF PRESENT ILLNESS
[de-identified] : Ms. BOUCHRA PALACIOS  is a 46 year  old female  presenting for a follow up visit. \par \par HOSPITAL COURSE @ [facility] 10/11/2021-10/15/2021: \par 45 year old female with a PMHx of hypothyroidism and depression presented to the [facility] ED on 10/11 for abdominal pain that had lasted for a month. Prior to presentation patient had been worked up by her gastroenterologist outpatient who had ordered an abdominal ultrasound. Ultrasound at that time showed a pancreatic mass. Subsequent CT abd/pelvis showed a pancreatic tail mass with splenic encasement. Patient was scheduled for a EGD with biopsy with Dr. Campbell however patient was in acute distress and presented to ED on 10/10. Gastroenterology was consulted and patient was seen by Dr. Campbell who performed a EUS FNB. 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 Hem/Onc was consulted and patient was evaluated with Dr. Mendez who did not recommend anti-coagulation because the thrombosis was a direct result of the tumor encasement. Patient was also seen by Dr. Howell Surgical Oncology who plans to resect the mass. Patient case was presented at a GI Tumour board and a 12 cycle course of Folfirinox prior to surgical resection was planned. CXR and CT Abdomen indicated no metastatic disease to the lungs or liver. During hospitalization a nerve block of the celiac plexus was attempted by Adrian but was unsuccessful due to risk of puncturing and seeding the pancreatic mass. Patient's pain was controlled via IV Dilaudid and a Fentanyl patch. Patient \par will continue to take PO Dilaudid and apply the fentanyl patch. Patient also underwent a procedure with Interventional Radiology to implant a chemotherapy port. Patient will see Dr. Mendez outpatient on 10/18/21 and will begin chemotherapy 10/19/21. \par \par Pancreas tail biopsy 10/11/2021- PATH: Poorly differentiated carcinoma\par \par Pts paternal aunt underwent genetic testing and was found to be BRCA 2+.  Pt. then decided to pursue genetic Testing in 2021 which revealed she is a BRCA 2+ gene carrier.  She met with Dr. Vera (Genetic counselor) and will undergo more extensive genetic testing. Results pending. \par \par PMH: Depression, hypothyroidism, BRCA 2+, \par PSH:  EGD/EUS\par Social Hx: Never a smoker, social alcohol use\par \par INTERVAL HISTORY:\par 10/21/2021- Pt. was started on neoadjuvant chemotherapy with FOLFIRINOX on 10/19/2021.  Having nausea and diarrhea.  Also pain in back with poor pain control with narcotic medication.\par \par 21 Ca 19-9 28 U/mL\par \par CT C/A/P performed on 21 revealed Pancreatic tail mass decreased in size since 10/11/2021. Apparent new occlusion splenic artery with extensive splenic infarction. Encasement left gastric artery and celiac axis. Splenic vein occlusion.\par \par 21- Pt cont. on Folfirinox. Today she is feeling pretty well.  She has had some tough symptoms which have been managed appropriately.  She is overall holding up well.\par \par 2022- Pt. testing positive for COVID in 2021. On chemo under the care of Dr. Mendez.  Re-staging CT C/A/P performed on 2/15/2022 showed further decrease in size of the pancreatic tail carcinoma as described with focal abutment less than 180 degrees of proximal celiac artery and proximal common hepatic artery by ill-defined peripancreatic low-attenuation which could represent neurovascular extent of disease versus pancreatitis or treatment-related changes. No venous encasement involving superior mesenteric vein or main portal vein. Similar extent of infiltrative low-attenuation which abuts the left adrenal gland and left celiac ganglion. Acute colitis involving the ascending transverse and descending colon.  No imaging evidence of metastatic disease involving the chest, abdomen or pelvis. \par \par 3/14/2022- Started XRT x 5 weeks with Xeloda (w/ Lourdes Tobar)\par \par 2022- Xeloda was stopped due to neuropathies\par \par 22- XRT completed \par \par 5/10/22: Patient presents today for 3 vaccines administration in preparation for splenectomy \par \par

## 2022-05-10 NOTE — PHYSICAL EXAM
[FreeTextEntry1] : 3 vaccines administration. Patient tolerated well \par 1)Bexsero:\par NDC # 79847-972-90\par Lot# [id number]\par EXP: 3/2023\par \par 2) Menactra \par NDC# 07824-764-78\par Lot# [id number]\par EXP: 3/14/23\par \par 3) Haemophilus B \par NDC# 86682-839-57\par Lot# [id number]\par EXP: 10/14/23\par \par

## 2022-05-12 ENCOUNTER — OUTPATIENT (OUTPATIENT)
Dept: OUTPATIENT SERVICES | Facility: HOSPITAL | Age: 47
LOS: 1 days | End: 2022-05-12
Payer: COMMERCIAL

## 2022-05-12 ENCOUNTER — APPOINTMENT (OUTPATIENT)
Dept: CT IMAGING | Facility: CLINIC | Age: 47
End: 2022-05-12
Payer: COMMERCIAL

## 2022-05-12 DIAGNOSIS — C25.9 MALIGNANT NEOPLASM OF PANCREAS, UNSPECIFIED: ICD-10-CM

## 2022-05-12 DIAGNOSIS — Z78.9 OTHER SPECIFIED HEALTH STATUS: Chronic | ICD-10-CM

## 2022-05-12 PROCEDURE — 74178 CT ABD&PLV WO CNTR FLWD CNTR: CPT | Mod: 26

## 2022-05-12 PROCEDURE — 74178 CT ABD&PLV WO CNTR FLWD CNTR: CPT

## 2022-05-13 ENCOUNTER — OUTPATIENT (OUTPATIENT)
Dept: OUTPATIENT SERVICES | Facility: HOSPITAL | Age: 47
LOS: 1 days | End: 2022-05-13

## 2022-05-13 VITALS
OXYGEN SATURATION: 98 % | RESPIRATION RATE: 16 BRPM | TEMPERATURE: 98 F | HEIGHT: 63 IN | DIASTOLIC BLOOD PRESSURE: 79 MMHG | SYSTOLIC BLOOD PRESSURE: 120 MMHG | HEART RATE: 68 BPM | WEIGHT: 162.04 LBS

## 2022-05-13 DIAGNOSIS — E03.9 HYPOTHYROIDISM, UNSPECIFIED: ICD-10-CM

## 2022-05-13 DIAGNOSIS — F41.9 ANXIETY DISORDER, UNSPECIFIED: ICD-10-CM

## 2022-05-13 DIAGNOSIS — C25.9 MALIGNANT NEOPLASM OF PANCREAS, UNSPECIFIED: ICD-10-CM

## 2022-05-13 DIAGNOSIS — Z78.9 OTHER SPECIFIED HEALTH STATUS: Chronic | ICD-10-CM

## 2022-05-13 DIAGNOSIS — Z98.890 OTHER SPECIFIED POSTPROCEDURAL STATES: Chronic | ICD-10-CM

## 2022-05-13 DIAGNOSIS — Z45.2 ENCOUNTER FOR ADJUSTMENT AND MANAGEMENT OF VASCULAR ACCESS DEVICE: Chronic | ICD-10-CM

## 2022-05-13 DIAGNOSIS — Z92.21 PERSONAL HISTORY OF ANTINEOPLASTIC CHEMOTHERAPY: Chronic | ICD-10-CM

## 2022-05-13 LAB
ALBUMIN SERPL ELPH-MCNC: 4.4 G/DL — SIGNIFICANT CHANGE UP (ref 3.3–5)
ALP SERPL-CCNC: 127 U/L — HIGH (ref 40–120)
ALT FLD-CCNC: 26 U/L — SIGNIFICANT CHANGE UP (ref 4–33)
ANION GAP SERPL CALC-SCNC: 10 MMOL/L — SIGNIFICANT CHANGE UP (ref 7–14)
APTT BLD: 36.6 SEC — HIGH (ref 27–36.3)
AST SERPL-CCNC: 28 U/L — SIGNIFICANT CHANGE UP (ref 4–32)
BILIRUB SERPL-MCNC: 0.7 MG/DL — SIGNIFICANT CHANGE UP (ref 0.2–1.2)
BLD GP AB SCN SERPL QL: NEGATIVE — SIGNIFICANT CHANGE UP
BUN SERPL-MCNC: 13 MG/DL — SIGNIFICANT CHANGE UP (ref 7–23)
CALCIUM SERPL-MCNC: 9.2 MG/DL — SIGNIFICANT CHANGE UP (ref 8.4–10.5)
CHLORIDE SERPL-SCNC: 100 MMOL/L — SIGNIFICANT CHANGE UP (ref 98–107)
CO2 SERPL-SCNC: 28 MMOL/L — SIGNIFICANT CHANGE UP (ref 22–31)
CREAT SERPL-MCNC: 0.69 MG/DL — SIGNIFICANT CHANGE UP (ref 0.5–1.3)
EGFR: 110 ML/MIN/1.73M2 — SIGNIFICANT CHANGE UP
GLUCOSE SERPL-MCNC: 92 MG/DL — SIGNIFICANT CHANGE UP (ref 70–99)
HCG UR QL: NEGATIVE — SIGNIFICANT CHANGE UP
HCT VFR BLD CALC: 42.1 % — SIGNIFICANT CHANGE UP (ref 34.5–45)
HGB BLD-MCNC: 13.6 G/DL — SIGNIFICANT CHANGE UP (ref 11.5–15.5)
INR BLD: 1 RATIO — SIGNIFICANT CHANGE UP (ref 0.88–1.16)
MCHC RBC-ENTMCNC: 30.8 PG — SIGNIFICANT CHANGE UP (ref 27–34)
MCHC RBC-ENTMCNC: 32.3 GM/DL — SIGNIFICANT CHANGE UP (ref 32–36)
MCV RBC AUTO: 95.5 FL — SIGNIFICANT CHANGE UP (ref 80–100)
NRBC # BLD: 0 /100 WBCS — SIGNIFICANT CHANGE UP
NRBC # FLD: 0 K/UL — SIGNIFICANT CHANGE UP
PLATELET # BLD AUTO: 218 K/UL — SIGNIFICANT CHANGE UP (ref 150–400)
POTASSIUM SERPL-MCNC: 3.5 MMOL/L — SIGNIFICANT CHANGE UP (ref 3.5–5.3)
POTASSIUM SERPL-SCNC: 3.5 MMOL/L — SIGNIFICANT CHANGE UP (ref 3.5–5.3)
PROT SERPL-MCNC: 7.5 G/DL — SIGNIFICANT CHANGE UP (ref 6–8.3)
PROTHROM AB SERPL-ACNC: 11.6 SEC — SIGNIFICANT CHANGE UP (ref 10.5–13.4)
RBC # BLD: 4.41 M/UL — SIGNIFICANT CHANGE UP (ref 3.8–5.2)
RBC # FLD: 14.6 % — HIGH (ref 10.3–14.5)
RH IG SCN BLD-IMP: POSITIVE — SIGNIFICANT CHANGE UP
SODIUM SERPL-SCNC: 138 MMOL/L — SIGNIFICANT CHANGE UP (ref 135–145)
WBC # BLD: 8.66 K/UL — SIGNIFICANT CHANGE UP (ref 3.8–10.5)
WBC # FLD AUTO: 8.66 K/UL — SIGNIFICANT CHANGE UP (ref 3.8–10.5)

## 2022-05-13 RX ORDER — SODIUM CHLORIDE 9 MG/ML
1000 INJECTION, SOLUTION INTRAVENOUS
Refills: 0 | Status: DISCONTINUED | OUTPATIENT
Start: 2022-05-27 | End: 2022-05-28

## 2022-05-13 NOTE — H&P PST ADULT - ATTENDING COMMENTS
Risks, benefits, and alternatives discussed with the patient - she expressed understanding and agreed to proceed with diagnostic laparoscopy, distal pancreatectomy, splenectomy, possible open.

## 2022-05-13 NOTE — H&P PST ADULT - NEGATIVE CARDIOVASCULAR SYMPTOMS
no chest pain/no palpitations/no dyspnea on exertion/no paroxysmal nocturnal dyspnea/no peripheral edema No

## 2022-05-13 NOTE — H&P PST ADULT - NSANTHOSAYNRD_GEN_A_CORE
No. HAKAN screening performed.  STOP BANG Legend: 0-2 = LOW Risk; 3-4 = INTERMEDIATE Risk; 5-8 = HIGH Risk

## 2022-05-13 NOTE — H&P PST ADULT - HISTORY OF PRESENT ILLNESS
47 y/o Female prsesentmalignant neoplasm of pancreas  47 y/o Female presents to presurgical testing with diagnosis of malignant neoplasm of pancreas scheduled for diagnostic laparoscopy distal pancreatectomy splenectomy possible left adrenalectomy. Pt was already treated with chemotherapy and radiation.

## 2022-05-13 NOTE — H&P PST ADULT - NSICDXPASTMEDICALHX_GEN_ALL_CORE_FT
PAST MEDICAL HISTORY:  Anxiety and depression     BRCA2 positive     Chemotherapy-induced neuropathy     COVID-19 virus infection 12/2021 treated with monoclonal antibodies    Hypothyroid     Malignant neoplasm of pancreas

## 2022-05-13 NOTE — H&P PST ADULT - PROBLEM SELECTOR PLAN 3
Patient instructed to take lexapro with a sip of water on the morning of procedure.   Pt instructed to take xanax as needed

## 2022-05-13 NOTE — H&P PST ADULT - PROBLEM SELECTOR PLAN 1
Patient tentatively scheduled for diagnostic laparoscopy distal pancreatectomy splenectomy possible left adrenalectomy for 5/27/22. Pre-op instructions provided. Pt given verbal and written instructions with teach back on chlorhexidine shampoo. Pt will take own omeprazole on the morning of procedure for GI prophylaxis.  Pt verbalized understanding with return demonstration.     Pt strongly advised to follow up with surgeon to discuss COVID testing requirements prior to procedure.

## 2022-05-19 ENCOUNTER — APPOINTMENT (OUTPATIENT)
Dept: SURGICAL ONCOLOGY | Facility: CLINIC | Age: 47
End: 2022-05-19
Payer: COMMERCIAL

## 2022-05-19 ENCOUNTER — APPOINTMENT (OUTPATIENT)
Dept: RADIATION ONCOLOGY | Facility: CLINIC | Age: 47
End: 2022-05-19
Payer: COMMERCIAL

## 2022-05-19 VITALS
HEART RATE: 68 BPM | DIASTOLIC BLOOD PRESSURE: 80 MMHG | WEIGHT: 164 LBS | RESPIRATION RATE: 16 BRPM | BODY MASS INDEX: 28 KG/M2 | SYSTOLIC BLOOD PRESSURE: 120 MMHG | TEMPERATURE: 98 F | OXYGEN SATURATION: 98 % | HEIGHT: 64 IN

## 2022-05-19 VITALS
BODY MASS INDEX: 28.34 KG/M2 | SYSTOLIC BLOOD PRESSURE: 122 MMHG | HEART RATE: 75 BPM | HEIGHT: 64 IN | OXYGEN SATURATION: 98 % | WEIGHT: 166 LBS | DIASTOLIC BLOOD PRESSURE: 74 MMHG | RESPIRATION RATE: 20 BRPM

## 2022-05-19 PROCEDURE — 99215 OFFICE O/P EST HI 40 MIN: CPT

## 2022-05-19 PROCEDURE — 99024 POSTOP FOLLOW-UP VISIT: CPT

## 2022-05-19 RX ORDER — PNEUMOCOCCAL 13-VALENT CONJUGATE VACCINE 2.2; 2.2; 2.2; 2.2; 2.2; 4.4; 2.2; 2.2; 2.2; 2.2; 2.2; 2.2; 2.2 UG/.5ML; UG/.5ML; UG/.5ML; UG/.5ML; UG/.5ML; UG/.5ML; UG/.5ML; UG/.5ML; UG/.5ML; UG/.5ML; UG/.5ML; UG/.5ML; UG/.5ML
INJECTION, SUSPENSION INTRAMUSCULAR ONCE
Qty: 1 | Refills: 0 | Status: DISCONTINUED | COMMUNITY
Start: 2022-05-03 | End: 2022-05-19

## 2022-05-19 RX ORDER — CAPECITABINE 150 MG/1
150 TABLET, FILM COATED ORAL
Qty: 60 | Refills: 1 | Status: DISCONTINUED | COMMUNITY
Start: 2022-03-08 | End: 2022-05-19

## 2022-05-19 RX ORDER — HAEMOPHILUS INFLUENZAE TYPE B STRAIN 1482 CAPSULAR POLYSACCHARIDE TETANUS TOXOID CONJUGATE ANTIGEN 10 MCG/0.5
KIT INTRAMUSCULAR
Qty: 1 | Refills: 0 | Status: DISCONTINUED | COMMUNITY
Start: 2022-05-03 | End: 2022-05-19

## 2022-05-19 RX ORDER — PNEUMOCOCCAL 20-VALENT CONJUGATE VACCINE 2.2; 2.2; 2.2; 2.2; 2.2; 2.2; 2.2; 2.2; 2.2; 2.2; 2.2; 2.2; 2.2; 2.2; 2.2; 2.2; 4.4; 2.2; 2.2; 2.2 UG/.5ML; UG/.5ML; UG/.5ML; UG/.5ML; UG/.5ML; UG/.5ML; UG/.5ML; UG/.5ML; UG/.5ML; UG/.5ML; UG/.5ML; UG/.5ML; UG/.5ML; UG/.5ML; UG/.5ML; UG/.5ML; UG/.5ML; UG/.5ML; UG/.5ML; UG/.5ML
0.5 INJECTION, SUSPENSION INTRAMUSCULAR
Qty: 11 | Refills: 0 | Status: DISCONTINUED | COMMUNITY
Start: 2022-05-05 | End: 2022-05-19

## 2022-05-19 RX ORDER — NEISSERIA MENINGITIDIS GROUP A CAPSULAR POLYSACCHARIDE DIPHTHERIA TOXOID CONJUGATE ANTIGEN, NEISSERIA MENINGITIDIS GROUP C CAPSULAR POLYSACCHARIDE DIPHTHERIA TOXOID CONJUGATE ANTIGEN, NEISSERIA MENINGITIDIS GROUP Y CAPSULAR POLYSACCHARIDE DIPHTHERIA TOXOID CONJUGATE ANTIGEN, AND NEISSERIA MENINGITIDIS GROUP W-135 CAPSULAR POLYSACCHARIDE DIPHTHERIA TOXOID CONJUGATE ANTIGEN 4; 4; 4; 4 UG/.5ML; UG/.5ML; UG/.5ML; UG/.5ML
INJECTION, SOLUTION INTRAMUSCULAR
Qty: 1 | Refills: 0 | Status: DISCONTINUED | COMMUNITY
Start: 2022-05-03 | End: 2022-05-19

## 2022-05-19 RX ORDER — CAPECITABINE 500 MG/1
500 TABLET ORAL
Qty: 60 | Refills: 1 | Status: DISCONTINUED | COMMUNITY
Start: 2022-03-08 | End: 2022-05-19

## 2022-05-19 RX ORDER — PNEUMOCOCCAL 20-VALENT CONJUGATE VACCINE 2.2; 2.2; 2.2; 2.2; 2.2; 2.2; 2.2; 2.2; 2.2; 2.2; 2.2; 2.2; 2.2; 2.2; 2.2; 2.2; 4.4; 2.2; 2.2; 2.2 UG/.5ML; UG/.5ML; UG/.5ML; UG/.5ML; UG/.5ML; UG/.5ML; UG/.5ML; UG/.5ML; UG/.5ML; UG/.5ML; UG/.5ML; UG/.5ML; UG/.5ML; UG/.5ML; UG/.5ML; UG/.5ML; UG/.5ML; UG/.5ML; UG/.5ML; UG/.5ML
0.5 INJECTION, SUSPENSION INTRAMUSCULAR
Qty: 1 | Refills: 0 | Status: DISCONTINUED | COMMUNITY
Start: 2022-05-04 | End: 2022-05-19

## 2022-05-19 NOTE — HISTORY OF PRESENT ILLNESS
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  Saw PCP, referred to GI Dr. Maldonado. \par 9/30/21 - Abd US - 3.9 x 4.6 x 4.8cm heterogeneous mass in region of pancreatic tail. \par 10/7/21 - CT A/P - pancreatic tail mass with splenic artery encasement and splenic vein thrombosis.  Borderline PALN's. \par 10/11/21 - 10/15/21 - Admit to  for severe pain.  Was set up for outpatient EGD with biopsy but had severe pains, nausea, dry-heaving and so went to ER. \par 10/11/21 - CT Chest - no evidence of disease.\par 10/11/21 - MRCP - tumor encases splenic artery, celiac artery contact <180, splenic vein occlusion.  Possible retroperitoneal extension with left adrenal gland contact and possible extension into left celiac ganglion.\par 10/12/21 - EUS, FNA pancreatic mass - scanty specimen - PATH - poorly differentiated carcinoma. \par 10/14/21 - Celiac block attempted - not successful - no window due to tumor overlying plexus. 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 \par \par 10/18/21 - INVITAE Comprehensive genetic panel - BRCA2 +\par 10/22/21 - UGT1A1 - homo TA7 (*28)\par \par 10/19/21 - Started FOLFIRINOX\par Patient with 1 week dose delay prior to C#4 for neutropenia and prior to C#6 due to COVID in December 2021\par \par 12/13/21 - CT C/A/P (after 4 cycles) - Pancreatic mass now 4.5 x 2.9cm (was 5.8 x 4.9cm).  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  New 2.3 x 1.2 medial subcapsular collection. ? seroma. Encasement of left gastric artery and celiac axis.  Splenic vein occlusion. \par She had COVID at the end of 12/21 with severe diarrhea and nausea and received antibody therapy and recovered.\par \par 2/15/22 CT C/A/P showing further decrease in size of pancreatic tail carcinoma with focal abutment less then 180 degrees of proximal celiac and proximal common hepatic artery by ill-defined peripancreatic low- attenuation which could represent neurovascular extent of the disease vs pancreatitis or treatment related changes.No venous enhancement involving superior mesenteric vein or mail portal vein. Similar extend of infiltrative low- attenuation which abuts the left adrenal gland and left celiac ganglion. Acute colitis involving the ascending transverse and descending colon. No evidence of metastatic disease in chest, abdomen, pelvis. \par \par She completed chemotherapy under Dr. Mendez care and was referred for evaluation regarding radiation treatment. Her pain has resolved and she is weaning off fentanyl patch. She has lost 20 lbs but her weight is now stable at 160 lbs. She completed a dose of 5000 cGy to the pancreas on 4/18/22. \par \par She presents for a one month PTE. Stopped Xeloda on 4/12/22 due to neuropathies.. She had a lot of emotional stress during RT but is feeling much better. Nausea has resolved. Appetite and sleep have since improved. No longer using medical cannabis. She saw Dr. Cobian who advised PT BID and more physical activity during the day. She started PT but will resume after her surgery next week. Pancreatectomy and splenectomy scheduled for 5/27/22 with Dr. Howell. She is maintaining her weight. 5/12/22 CT A/P infiltrative pancreatic tail mass minimally enlarged with occlusion of splenic artery and vein and partial encasement celiac axis and left hepatic artery as previously demonstrated. Size approximately 2.7 x 1.7 cm previously 2.3 x 1.6 cm. \par

## 2022-05-23 NOTE — PHYSICAL EXAM
[Normal] : supple, no neck mass and thyroid not enlarged [Normal Neck Lymph Nodes] : normal neck lymph nodes  [Normal Supraclavicular Lymph Nodes] : normal supraclavicular lymph nodes [Normal Groin Lymph Nodes] : normal groin lymph nodes [Normal Axillary Lymph Nodes] : normal axillary lymph nodes [Normal] : oriented to person, place and time, with appropriate affect [de-identified] : soft NT ND

## 2022-05-23 NOTE — HISTORY OF PRESENT ILLNESS
[de-identified] : Ms. BOUCHRA PALACIOS  is a 46 year  old female  presenting for a follow up visit. \par \par HOSPITAL COURSE @ [facility] 10/11/2021-10/15/2021: \par 45 year old female with a PMHx of hypothyroidism and depression presented to the [facility] ED on 10/11 for abdominal pain that had lasted for a month. Prior to presentation patient had been worked up by her gastroenterologist outpatient who had ordered an abdominal ultrasound. Ultrasound at that time showed a pancreatic mass. Subsequent CT abd/pelvis showed a pancreatic tail mass with splenic encasement. Patient was scheduled for a EGD with biopsy with Dr. Campbell however patient was in acute distress and presented to ED on 10/10. Gastroenterology was consulted and patient was seen by Dr. Campbell who performed a EUS FNB. 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 Hem/Onc was consulted and patient was evaluated with Dr. Mendez who did not recommend anti-coagulation because the thrombosis was a direct result of the tumor encasement. Patient was also seen by Dr. Howell Surgical Oncology who plans to resect the mass. Patient case was presented at a GI Tumour board and a 12 cycle course of Folfirinox prior to surgical resection was planned. CXR and CT Abdomen indicated no metastatic disease to the lungs or liver. During hospitalization a nerve block of the celiac plexus was attempted by Adrian but was unsuccessful due to risk of puncturing and seeding the pancreatic mass. Patient's pain was controlled via IV Dilaudid and a Fentanyl patch. Patient \par will continue to take PO Dilaudid and apply the fentanyl patch. Patient also underwent a procedure with Interventional Radiology to implant a chemotherapy port. Patient will see Dr. Mendez outpatient on 10/18/21 and will begin chemotherapy 10/19/21. \par \par Pancreas tail biopsy 10/11/2021- PATH: Poorly differentiated carcinoma\par \par Pts paternal aunt underwent genetic testing and was found to be BRCA 2+.  Pt. then decided to pursue genetic Testing in 2021 which revealed she is a BRCA 2+ gene carrier.  She met with Dr. Vera (Genetic counselor) and will undergo more extensive genetic testing. Results pending. \par \par PMH: Depression, hypothyroidism, BRCA 2+, \par PSH:  EGD/EUS\par Social Hx: Never a smoker, social alcohol use\par \par INTERVAL HISTORY:\par 10/21/2021- Pt. was started on neoadjuvant chemotherapy with FOLFIRINOX on 10/19/2021.  Having nausea and diarrhea.  Also pain in back with poor pain control with narcotic medication.\par \par 21 Ca 19-9 28 U/mL\par \par CT C/A/P performed on 21 revealed Pancreatic tail mass decreased in size since 10/11/2021. Apparent new occlusion splenic artery with extensive splenic infarction. Encasement left gastric artery and celiac axis. Splenic vein occlusion.\par \par 21- Pt cont. on Folfirinox. Today she is feeling pretty well.  She has had some tough symptoms which have been managed appropriately.  She is overall holding up well.\par \par 2022- Pt. testing positive for COVID in 2021. On chemo under the care of Dr. Mendez.  Re-staging CT C/A/P performed on 2/15/2022 showed further decrease in size of the pancreatic tail carcinoma as described with focal abutment less than 180 degrees of proximal celiac artery and proximal common hepatic artery by ill-defined peripancreatic low-attenuation which could represent neurovascular extent of disease versus pancreatitis or treatment-related changes. No venous encasement involving superior mesenteric vein or main portal vein. Similar extent of infiltrative low-attenuation which abuts the left adrenal gland and left celiac ganglion. Acute colitis involving the ascending transverse and descending colon.  No imaging evidence of metastatic disease involving the chest, abdomen or pelvis. \par \par 3/14/2022- Started XRT x 5 weeks with Xeloda (w/ Lourdes Tobar)\par \par 2022- Xeloda was stopped due to neuropathies\par \par 22- XRT completed \par \par 5/10/22- Received 3 vaccines in preparation for splenectomy (Bexsero, Menactra, Hib)\par \par 2022- CT A/P - infiltrative pancreatic tail mass minimally enlarged since 2/15/22 with occlusion of splenic artery and vein and partial encasement celiac axis and left hepatic artery as previously demonstrated.  Size: Approximately 2.7 x 1.7 cm, previously 2.3 x 1.6 cm. \par \par 22- Here to discuss recent CT.  Scheduled for distal pancreatectomy and splenectomy on 22. \par \par

## 2022-05-23 NOTE — CONSULT LETTER
[Dear  ___] : Dear  [unfilled], [Courtesy Letter:] : I had the pleasure of seeing your patient, [unfilled], in my office today. [Consult Closing:] : Thank you very much for allowing me to participate in the care of this patient.  If you have any questions, please do not hesitate to contact me. [Sincerely,] : Sincerely, [FreeTextEntry3] : Juni Howell MD, MPH, FACS, FSSO\par , [facility]\par [facility]\par Associate Professor of Surgery\par [employer] [facility]  [DrVidhi  ___] : Dr. MENDEZ [DrVidhi ___] : Dr. MENDEZ

## 2022-05-23 NOTE — ASSESSMENT
[FreeTextEntry1] : Ms. BOUCHRA PALACIOS  is a 46 year old female with PMHx hypothyroid, depression and BRCA2+, recently diagnosed with clinical stage III pancreatic tail cancer. Pt. was started on neoadjuvant FOLFIRINOX on 10/19/2021.  Pt. will continue for up to 12 cycles, restage after 4th cycle. \par \par CT C/A/P performed on 12/13/21 revealed Pancreatic tail mass decreased in size since 10/11/2021. Apparent new occlusion splenic artery with extensive splenic infarction. Encasement left gastric artery and celiac axis. Splenic vein occlusion.\par \par -Pt. testing positive for COVID in 12/2021. \par -On chemo under the care of Dr. Mendez.  \par -Re-staging CT C/A/P performed on 2/15/2022 showed further decrease in size of the pancreatic tail carcinoma as described with focal abutment less than 180 degrees of proximal celiac artery and proximal common hepatic artery by ill-defined peripancreatic low-attenuation which could represent neurovascular extent of disease versus pancreatitis or treatment-related changes. No venous encasement involving superior mesenteric vein or main portal vein. Similar extent of infiltrative low-attenuation which abuts the left adrenal gland and left celiac ganglion. Acute colitis involving the ascending transverse and descending colon.  No imaging evidence of metastatic disease involving the chest, abdomen or pelvis. \par \par 3/14/2022- Started XRT x 5 weeks with Xeloda (w/ Lourdes Tobar)\par 4/12/2022- Xeloda was stopped due to neuropathies\par 4/18/22- XRT completed \par 5/12/2022- CT A/P - infiltrative pancreatic tail mass minimally enlarged since 2/15/22 with occlusion of splenic artery and vein and partial encasement celiac axis and left hepatic artery as previously demonstrated.  Size: Approximately 2.7 x 1.7 cm, previously 2.3 x 1.6 cm. \par \par PLAN:\par 1) Tentative surgery date of 5/27/2022. We discussed the risks, benefits, and alternatives of the procedure with the patient, she expressed understanding and agree to proceed with diagnostic laparoscopy, distal pancreatectomy, left adrenalectomy, splenectomy, possible Modified Carlos procedure with resection of the celiac axis in order to clear the disease.\par 2) Vaccines x 3

## 2022-05-26 ENCOUNTER — TRANSCRIPTION ENCOUNTER (OUTPATIENT)
Age: 47
End: 2022-05-26

## 2022-05-26 NOTE — ASU PATIENT PROFILE, ADULT - FALL HARM RISK - UNIVERSAL INTERVENTIONS
Bed in lowest position, wheels locked, appropriate side rails in place/Call bell, personal items and telephone in reach/Instruct patient to call for assistance before getting out of bed or chair/Non-slip footwear when patient is out of bed/[place] to call system/Physically safe environment - no spills, clutter or unnecessary equipment/Purposeful Proactive Rounding/Room/bathroom lighting operational, light cord in reach

## 2022-05-27 ENCOUNTER — TRANSCRIPTION ENCOUNTER (OUTPATIENT)
Age: 47
End: 2022-05-27

## 2022-05-27 ENCOUNTER — INPATIENT (INPATIENT)
Facility: HOSPITAL | Age: 47
LOS: 6 days | Discharge: HOME | End: 2022-06-03
Attending: SURGERY | Admitting: SURGERY
Payer: COMMERCIAL

## 2022-05-27 ENCOUNTER — APPOINTMENT (OUTPATIENT)
Dept: SURGICAL ONCOLOGY | Facility: HOSPITAL | Age: 47
End: 2022-05-27

## 2022-05-27 ENCOUNTER — RESULT REVIEW (OUTPATIENT)
Age: 47
End: 2022-05-27

## 2022-05-27 VITALS
DIASTOLIC BLOOD PRESSURE: 66 MMHG | OXYGEN SATURATION: 100 % | TEMPERATURE: 98 F | HEIGHT: 63 IN | WEIGHT: 162.04 LBS | HEART RATE: 69 BPM | RESPIRATION RATE: 16 BRPM | SYSTOLIC BLOOD PRESSURE: 105 MMHG

## 2022-05-27 DIAGNOSIS — Z45.2 ENCOUNTER FOR ADJUSTMENT AND MANAGEMENT OF VASCULAR ACCESS DEVICE: Chronic | ICD-10-CM

## 2022-05-27 DIAGNOSIS — C25.9 MALIGNANT NEOPLASM OF PANCREAS, UNSPECIFIED: ICD-10-CM

## 2022-05-27 DIAGNOSIS — Z98.890 OTHER SPECIFIED POSTPROCEDURAL STATES: Chronic | ICD-10-CM

## 2022-05-27 LAB
ALBUMIN SERPL ELPH-MCNC: 3.7 G/DL — SIGNIFICANT CHANGE UP (ref 3.3–5)
ALP SERPL-CCNC: 105 U/L — SIGNIFICANT CHANGE UP (ref 40–120)
ALT FLD-CCNC: 37 U/L — HIGH (ref 4–33)
ANION GAP SERPL CALC-SCNC: 11 MMOL/L — SIGNIFICANT CHANGE UP (ref 7–14)
AST SERPL-CCNC: 46 U/L — HIGH (ref 4–32)
BASOPHILS # BLD AUTO: 0.03 K/UL — SIGNIFICANT CHANGE UP (ref 0–0.2)
BASOPHILS NFR BLD AUTO: 0.2 % — SIGNIFICANT CHANGE UP (ref 0–2)
BILIRUB SERPL-MCNC: 1.7 MG/DL — HIGH (ref 0.2–1.2)
BUN SERPL-MCNC: 11 MG/DL — SIGNIFICANT CHANGE UP (ref 7–23)
CALCIUM SERPL-MCNC: 8.2 MG/DL — LOW (ref 8.4–10.5)
CHLORIDE SERPL-SCNC: 103 MMOL/L — SIGNIFICANT CHANGE UP (ref 98–107)
CO2 SERPL-SCNC: 22 MMOL/L — SIGNIFICANT CHANGE UP (ref 22–31)
CREAT SERPL-MCNC: 0.65 MG/DL — SIGNIFICANT CHANGE UP (ref 0.5–1.3)
EGFR: 111 ML/MIN/1.73M2 — SIGNIFICANT CHANGE UP
EOSINOPHIL # BLD AUTO: 0 K/UL — SIGNIFICANT CHANGE UP (ref 0–0.5)
EOSINOPHIL NFR BLD AUTO: 0 % — SIGNIFICANT CHANGE UP (ref 0–6)
GAS PNL BLDA: SIGNIFICANT CHANGE UP
GLUCOSE SERPL-MCNC: 144 MG/DL — HIGH (ref 70–99)
HCG UR QL: NEGATIVE — SIGNIFICANT CHANGE UP
HCT VFR BLD CALC: 40.4 % — SIGNIFICANT CHANGE UP (ref 34.5–45)
HGB BLD-MCNC: 13.4 G/DL — SIGNIFICANT CHANGE UP (ref 11.5–15.5)
IANC: 11.21 K/UL — HIGH (ref 1.8–7.4)
IMM GRANULOCYTES NFR BLD AUTO: 0.3 % — SIGNIFICANT CHANGE UP (ref 0–1.5)
LYMPHOCYTES # BLD AUTO: 0.65 K/UL — LOW (ref 1–3.3)
LYMPHOCYTES # BLD AUTO: 5.2 % — LOW (ref 13–44)
MCHC RBC-ENTMCNC: 31.2 PG — SIGNIFICANT CHANGE UP (ref 27–34)
MCHC RBC-ENTMCNC: 33.2 GM/DL — SIGNIFICANT CHANGE UP (ref 32–36)
MCV RBC AUTO: 94.2 FL — SIGNIFICANT CHANGE UP (ref 80–100)
MONOCYTES # BLD AUTO: 0.63 K/UL — SIGNIFICANT CHANGE UP (ref 0–0.9)
MONOCYTES NFR BLD AUTO: 5 % — SIGNIFICANT CHANGE UP (ref 2–14)
NEUTROPHILS # BLD AUTO: 11.21 K/UL — HIGH (ref 1.8–7.4)
NEUTROPHILS NFR BLD AUTO: 89.3 % — HIGH (ref 43–77)
NRBC # BLD: 0 /100 WBCS — SIGNIFICANT CHANGE UP
NRBC # FLD: 0 K/UL — SIGNIFICANT CHANGE UP
PLATELET # BLD AUTO: 219 K/UL — SIGNIFICANT CHANGE UP (ref 150–400)
POTASSIUM SERPL-MCNC: 3.8 MMOL/L — SIGNIFICANT CHANGE UP (ref 3.5–5.3)
POTASSIUM SERPL-SCNC: 3.8 MMOL/L — SIGNIFICANT CHANGE UP (ref 3.5–5.3)
PROT SERPL-MCNC: 6.5 G/DL — SIGNIFICANT CHANGE UP (ref 6–8.3)
RBC # BLD: 4.29 M/UL — SIGNIFICANT CHANGE UP (ref 3.8–5.2)
RBC # FLD: 14.6 % — HIGH (ref 10.3–14.5)
SODIUM SERPL-SCNC: 136 MMOL/L — SIGNIFICANT CHANGE UP (ref 135–145)
WBC # BLD: 12.56 K/UL — HIGH (ref 3.8–10.5)
WBC # FLD AUTO: 12.56 K/UL — HIGH (ref 3.8–10.5)

## 2022-05-27 PROCEDURE — 88309 TISSUE EXAM BY PATHOLOGIST: CPT | Mod: 26

## 2022-05-27 PROCEDURE — 74018 RADEX ABDOMEN 1 VIEW: CPT | Mod: 26,59

## 2022-05-27 PROCEDURE — 48140 PARTIAL REMOVAL OF PANCREAS: CPT | Mod: 82

## 2022-05-27 PROCEDURE — 49905 OMENTAL FLAP INTRA-ABDOM: CPT | Mod: 82

## 2022-05-27 PROCEDURE — 49320 DIAG LAPARO SEPARATE PROC: CPT | Mod: 59

## 2022-05-27 PROCEDURE — 49905 OMENTAL FLAP INTRA-ABDOM: CPT

## 2022-05-27 PROCEDURE — 48140 PARTIAL REMOVAL OF PANCREAS: CPT | Mod: 22

## 2022-05-27 PROCEDURE — 49320 DIAG LAPARO SEPARATE PROC: CPT | Mod: 82,59

## 2022-05-27 PROCEDURE — 60540 EXPLORE ADRENAL GLAND: CPT | Mod: 82

## 2022-05-27 PROCEDURE — 60540 EXPLORE ADRENAL GLAND: CPT

## 2022-05-27 PROCEDURE — 38747 REMOVE ABDOMINAL LYMPH NODES: CPT | Mod: 59

## 2022-05-27 DEVICE — SURGICEL 2 X 14": Type: IMPLANTABLE DEVICE | Status: FUNCTIONAL

## 2022-05-27 DEVICE — SURGICEL 2 X 3": Type: IMPLANTABLE DEVICE | Status: FUNCTIONAL

## 2022-05-27 DEVICE — STAPLER COVIDIEN TRI-STAPLE 45MM PURPLE INTELLIGENT RELOAD: Type: IMPLANTABLE DEVICE | Status: FUNCTIONAL

## 2022-05-27 DEVICE — STAPLER COVIDIEN TRI-STAPLE CURVED 45MM GRAY RELOAD: Type: IMPLANTABLE DEVICE | Status: FUNCTIONAL

## 2022-05-27 DEVICE — STAPLER COVIDIEN TRI-STAPLE 60MM PURPLE RELOAD: Type: IMPLANTABLE DEVICE | Status: FUNCTIONAL

## 2022-05-27 DEVICE — STAPLER COVIDIEN TRI-STAPLE 45MM TAN RELOAD: Type: IMPLANTABLE DEVICE | Status: FUNCTIONAL

## 2022-05-27 DEVICE — STAPLER COVIDIEN TRI-STAPLE 60MM BLACK INTELLIGENT RELOAD: Type: IMPLANTABLE DEVICE | Status: FUNCTIONAL

## 2022-05-27 RX ORDER — PANTOPRAZOLE SODIUM 20 MG/1
40 TABLET, DELAYED RELEASE ORAL DAILY
Refills: 0 | Status: ACTIVE | OUTPATIENT
Start: 2022-05-27 | End: 2023-04-25

## 2022-05-27 RX ORDER — HYDROMORPHONE HYDROCHLORIDE 2 MG/ML
0.25 INJECTION INTRAMUSCULAR; INTRAVENOUS; SUBCUTANEOUS
Refills: 0 | Status: DISCONTINUED | OUTPATIENT
Start: 2022-05-27 | End: 2022-05-27

## 2022-05-27 RX ORDER — HYDROMORPHONE HYDROCHLORIDE 2 MG/ML
0.5 INJECTION INTRAMUSCULAR; INTRAVENOUS; SUBCUTANEOUS
Refills: 0 | Status: DISCONTINUED | OUTPATIENT
Start: 2022-05-27 | End: 2022-05-27

## 2022-05-27 RX ORDER — KETOTIFEN FUMARATE 0.34 MG/ML
1 SOLUTION OPHTHALMIC DAILY
Refills: 0 | Status: DISCONTINUED | OUTPATIENT
Start: 2022-05-28 | End: 2022-06-01

## 2022-05-27 RX ORDER — SODIUM CHLORIDE 9 MG/ML
1000 INJECTION, SOLUTION INTRAVENOUS
Refills: 0 | Status: DISCONTINUED | OUTPATIENT
Start: 2022-05-27 | End: 2022-05-28

## 2022-05-27 RX ORDER — KETOTIFEN FUMARATE 0.34 MG/ML
1 SOLUTION OPHTHALMIC ONCE
Refills: 0 | Status: COMPLETED | OUTPATIENT
Start: 2022-05-27 | End: 2022-05-27

## 2022-05-27 RX ORDER — ENOXAPARIN SODIUM 100 MG/ML
40 INJECTION SUBCUTANEOUS EVERY 24 HOURS
Refills: 0 | Status: DISCONTINUED | OUTPATIENT
Start: 2022-05-28 | End: 2022-06-03

## 2022-05-27 RX ORDER — NALOXONE HYDROCHLORIDE 4 MG/.1ML
0.1 SPRAY NASAL
Refills: 0 | Status: DISCONTINUED | OUTPATIENT
Start: 2022-05-27 | End: 2022-06-03

## 2022-05-27 RX ORDER — DIPHENHYDRAMINE HCL 50 MG
25 CAPSULE ORAL ONCE
Refills: 0 | Status: COMPLETED | OUTPATIENT
Start: 2022-05-27 | End: 2022-05-30

## 2022-05-27 RX ORDER — ONDANSETRON 8 MG/1
4 TABLET, FILM COATED ORAL EVERY 6 HOURS
Refills: 0 | Status: ACTIVE | OUTPATIENT
Start: 2022-05-27 | End: 2023-04-25

## 2022-05-27 RX ORDER — KETOTIFEN FUMARATE 0.34 MG/ML
SOLUTION OPHTHALMIC
Refills: 0 | Status: DISCONTINUED | OUTPATIENT
Start: 2022-05-27 | End: 2022-06-01

## 2022-05-27 RX ORDER — ACETAMINOPHEN 500 MG
1000 TABLET ORAL EVERY 6 HOURS
Refills: 0 | Status: COMPLETED | OUTPATIENT
Start: 2022-05-27 | End: 2022-05-28

## 2022-05-27 RX ORDER — HYDROMORPHONE HYDROCHLORIDE 2 MG/ML
30 INJECTION INTRAMUSCULAR; INTRAVENOUS; SUBCUTANEOUS
Refills: 0 | Status: DISCONTINUED | OUTPATIENT
Start: 2022-05-27 | End: 2022-05-30

## 2022-05-27 RX ADMIN — HYDROMORPHONE HYDROCHLORIDE 30 MILLILITER(S): 2 INJECTION INTRAMUSCULAR; INTRAVENOUS; SUBCUTANEOUS at 20:42

## 2022-05-27 RX ADMIN — SODIUM CHLORIDE 100 MILLILITER(S): 9 INJECTION, SOLUTION INTRAVENOUS at 13:55

## 2022-05-27 RX ADMIN — HYDROMORPHONE HYDROCHLORIDE 30 MILLILITER(S): 2 INJECTION INTRAMUSCULAR; INTRAVENOUS; SUBCUTANEOUS at 17:44

## 2022-05-27 RX ADMIN — Medication 1000 MILLIGRAM(S): at 18:56

## 2022-05-27 RX ADMIN — KETOTIFEN FUMARATE 1 DROP(S): 0.34 SOLUTION OPHTHALMIC at 18:45

## 2022-05-27 RX ADMIN — Medication 400 MILLIGRAM(S): at 18:39

## 2022-05-27 NOTE — CHART NOTE - NSCHARTNOTESELECT_GEN_ALL_CORE
How Severe Is Your Skin Lesion?: mild
Have Your Skin Lesions Been Treated?: not been treated
Post Op Check/Event Note
Is This A New Presentation, Or A Follow-Up?: Skin Lesions
Shoulder pain

## 2022-05-27 NOTE — CHART NOTE - NSCHARTNOTEFT_GEN_A_CORE
D Team Surgery Post-Op Note P [id number]    SUBJECTIVE: pain controlled, no nausea/vomiting/headache/dizziness/chest pain/shortness of breath    OBJECTIVE:  PHYSICAL EXAM:  GA: NAD  HEENT: NGT in place  Abdomen:  -  soft, non-distended, appropriate incisional tenderness  prevena in palce  : Baldwin    Vitals/Labs:  Vital Signs Last 24 Hrs  T(C): 36.7 (27 May 2022 17:00), Max: 36.9 (27 May 2022 06:14)  T(F): 98.1 (27 May 2022 17:00), Max: 98.5 (27 May 2022 06:14)  HR: 74 (27 May 2022 18:30) (69 - 86)  BP: 98/53 (27 May 2022 18:30) (91/46 - 115/69)  BP(mean): 64 (27 May 2022 18:30) (55 - 90)  RR: 19 (27 May 2022 18:30) (11 - 20)  SpO2: 99% (27 May 2022 18:30) (92% - 100%)    I&O's Detail    27 May 2022 07:01  -  27 May 2022 18:44  --------------------------------------------------------  IN:    IV PiggyBack: 100 mL    Lactated Ringers: 600 mL  Total IN: 700 mL    OUT:    Indwelling Catheter - Urethral (mL): 365 mL    Nasogastric/Oral tube (mL): 0 mL    Oral Fluid: 0 mL  Total OUT: 365 mL    Total NET: 335 mL                                13.4   12.56 )-----------( 219      ( 27 May 2022 14:20 )             40.4       05-27    136  |  103  |  11  ----------------------------<  144<H>  3.8   |  22  |  0.65    Ca    8.2<L>      27 May 2022 14:44    TPro  6.5  /  Alb  3.7  /  TBili  1.7<H>  /  DBili  x   /  AST  46<H>  /  ALT  37<H>  /  AlkPhos  105  05-27      CAPILLARY BLOOD GLUCOSE          LIVER FUNCTIONS - ( 27 May 2022 14:44 )  Alb: 3.7 g/dL / Pro: 6.5 g/dL / ALK PHOS: 105 U/L / ALT: 37 U/L / AST: 46 U/L / GGT: x                     MEDICATIONS  (STANDING):  acetaminophen   IVPB .. 1000 milliGRAM(s) IV Intermittent every 6 hours  diphenhydrAMINE Injectable 25 milliGRAM(s) IV Push once  HYDROmorphone PCA (1 mG/mL) 30 milliLiter(s) PCA Continuous PCA Continuous  ketotifen 0.025% Ophthalmic Solution - Peds      ketotifen 0.025% Ophthalmic Solution - Peds 1 Drop(s) Both EYES once  lactated ringers. 1000 milliLiter(s) (100 mL/Hr) IV Continuous <Continuous>  lactated ringers. 1000 milliLiter(s) (30 mL/Hr) IV Continuous <Continuous>  pantoprazole  Injectable 40 milliGRAM(s) IV Push daily    MEDICATIONS  (PRN):  HYDROmorphone  Injectable 0.25 milliGRAM(s) IV Push every 10 minutes PRN Moderate Pain (4 - 6)  HYDROmorphone  Injectable 0.5 milliGRAM(s) IV Push every 10 minutes PRN Severe Pain (7 - 10)  naloxone Injectable 0.1 milliGRAM(s) IV Push every 3 minutes PRN For ANY of the following changes in patient status:  A. RR LESS THAN 10 breaths per minute, B. Oxygen saturation LESS THAN 90%, C. Sedation score of 6  ondansetron Injectable 4 milliGRAM(s) IV Push every 6 hours PRN Nausea      ASSESSMENT/PLAN: BOUCHRA PALACIOS 46y Female s/p  distal panrectomy, splenectomy, left adrenalectomy  - Diet: NPO  - NGT  - Pain control:   - Input and outputs   - C/w prevena  - DVT ppx  - OOB/incentive spirometry     D Team Surgery Post-Op Note [id number] D Team Surgery Post-Op Note P [id number]    SUBJECTIVE: pain controlled, no nausea/vomiting/headache/dizziness/chest pain/shortness of breath    OBJECTIVE:  PHYSICAL EXAM:  GA: NAD  HEENT: NGT in place  Abdomen:  -  soft, non-distended, appropriate incisional tenderness  prevena in palce  : Baldwin    Vitals/Labs:  Vital Signs Last 24 Hrs  T(C): 36.7 (27 May 2022 17:00), Max: 36.9 (27 May 2022 06:14)  T(F): 98.1 (27 May 2022 17:00), Max: 98.5 (27 May 2022 06:14)  HR: 74 (27 May 2022 18:30) (69 - 86)  BP: 98/53 (27 May 2022 18:30) (91/46 - 115/69)  BP(mean): 64 (27 May 2022 18:30) (55 - 90)  RR: 19 (27 May 2022 18:30) (11 - 20)  SpO2: 99% (27 May 2022 18:30) (92% - 100%)    I&O's Detail    27 May 2022 07:01  -  27 May 2022 18:44  --------------------------------------------------------  IN:    IV PiggyBack: 100 mL    Lactated Ringers: 600 mL  Total IN: 700 mL    OUT:    Indwelling Catheter - Urethral (mL): 365 mL    Nasogastric/Oral tube (mL): 0 mL    Oral Fluid: 0 mL  Total OUT: 365 mL    Total NET: 335 mL                                13.4   12.56 )-----------( 219      ( 27 May 2022 14:20 )             40.4       05-27    136  |  103  |  11  ----------------------------<  144<H>  3.8   |  22  |  0.65    Ca    8.2<L>      27 May 2022 14:44    TPro  6.5  /  Alb  3.7  /  TBili  1.7<H>  /  DBili  x   /  AST  46<H>  /  ALT  37<H>  /  AlkPhos  105  05-27      CAPILLARY BLOOD GLUCOSE          LIVER FUNCTIONS - ( 27 May 2022 14:44 )  Alb: 3.7 g/dL / Pro: 6.5 g/dL / ALK PHOS: 105 U/L / ALT: 37 U/L / AST: 46 U/L / GGT: x                     MEDICATIONS  (STANDING):  acetaminophen   IVPB .. 1000 milliGRAM(s) IV Intermittent every 6 hours  diphenhydrAMINE Injectable 25 milliGRAM(s) IV Push once  HYDROmorphone PCA (1 mG/mL) 30 milliLiter(s) PCA Continuous PCA Continuous  ketotifen 0.025% Ophthalmic Solution - Peds      ketotifen 0.025% Ophthalmic Solution - Peds 1 Drop(s) Both EYES once  lactated ringers. 1000 milliLiter(s) (100 mL/Hr) IV Continuous <Continuous>  lactated ringers. 1000 milliLiter(s) (30 mL/Hr) IV Continuous <Continuous>  pantoprazole  Injectable 40 milliGRAM(s) IV Push daily    MEDICATIONS  (PRN):  HYDROmorphone  Injectable 0.25 milliGRAM(s) IV Push every 10 minutes PRN Moderate Pain (4 - 6)  HYDROmorphone  Injectable 0.5 milliGRAM(s) IV Push every 10 minutes PRN Severe Pain (7 - 10)  naloxone Injectable 0.1 milliGRAM(s) IV Push every 3 minutes PRN For ANY of the following changes in patient status:  A. RR LESS THAN 10 breaths per minute, B. Oxygen saturation LESS THAN 90%, C. Sedation score of 6  ondansetron Injectable 4 milliGRAM(s) IV Push every 6 hours PRN Nausea      ASSESSMENT/PLAN: BOUCHRA PALACIOS 46y Female s/p  distal panrectomy, splenectomy, left adrenalectomy  - Diet: NPO  - NGT  - Pain control:   - Hydrocortisone  - Input and outputs   - C/w prevena  - DVT ppx  - OOB/incentive spirometry     D Team Surgery Post-Op Note [id number]

## 2022-05-27 NOTE — BRIEF OPERATIVE NOTE - OPERATION/FINDINGS
Diagnostic laparoscopy, distal pancreatectomy, splenectomy, left adrenalectomy     NO metastases on diagnostic lap, radiation changes to posterior wall of stomach and RP, large varicosities on greater curve. stuck to RP, frozen section of neck negative

## 2022-05-28 LAB
ANION GAP SERPL CALC-SCNC: 10 MMOL/L — SIGNIFICANT CHANGE UP (ref 7–14)
BASOPHILS # BLD AUTO: 0.02 K/UL — SIGNIFICANT CHANGE UP (ref 0–0.2)
BASOPHILS NFR BLD AUTO: 0.2 % — SIGNIFICANT CHANGE UP (ref 0–2)
BUN SERPL-MCNC: 11 MG/DL — SIGNIFICANT CHANGE UP (ref 7–23)
CALCIUM SERPL-MCNC: 8.3 MG/DL — LOW (ref 8.4–10.5)
CHLORIDE SERPL-SCNC: 101 MMOL/L — SIGNIFICANT CHANGE UP (ref 98–107)
CO2 SERPL-SCNC: 25 MMOL/L — SIGNIFICANT CHANGE UP (ref 22–31)
CREAT SERPL-MCNC: 0.6 MG/DL — SIGNIFICANT CHANGE UP (ref 0.5–1.3)
EGFR: 113 ML/MIN/1.73M2 — SIGNIFICANT CHANGE UP
EOSINOPHIL # BLD AUTO: 0.01 K/UL — SIGNIFICANT CHANGE UP (ref 0–0.5)
EOSINOPHIL NFR BLD AUTO: 0.1 % — SIGNIFICANT CHANGE UP (ref 0–6)
GLUCOSE SERPL-MCNC: 103 MG/DL — HIGH (ref 70–99)
HCT VFR BLD CALC: 40.3 % — SIGNIFICANT CHANGE UP (ref 34.5–45)
HGB BLD-MCNC: 13.2 G/DL — SIGNIFICANT CHANGE UP (ref 11.5–15.5)
IANC: 8.61 K/UL — HIGH (ref 1.8–7.4)
IMM GRANULOCYTES NFR BLD AUTO: 0.4 % — SIGNIFICANT CHANGE UP (ref 0–1.5)
LYMPHOCYTES # BLD AUTO: 1.14 K/UL — SIGNIFICANT CHANGE UP (ref 1–3.3)
LYMPHOCYTES # BLD AUTO: 10.4 % — LOW (ref 13–44)
MAGNESIUM SERPL-MCNC: 2.2 MG/DL — SIGNIFICANT CHANGE UP (ref 1.6–2.6)
MCHC RBC-ENTMCNC: 31.3 PG — SIGNIFICANT CHANGE UP (ref 27–34)
MCHC RBC-ENTMCNC: 32.8 GM/DL — SIGNIFICANT CHANGE UP (ref 32–36)
MCV RBC AUTO: 95.5 FL — SIGNIFICANT CHANGE UP (ref 80–100)
MONOCYTES # BLD AUTO: 1.12 K/UL — HIGH (ref 0–0.9)
MONOCYTES NFR BLD AUTO: 10.2 % — SIGNIFICANT CHANGE UP (ref 2–14)
NEUTROPHILS # BLD AUTO: 8.61 K/UL — HIGH (ref 1.8–7.4)
NEUTROPHILS NFR BLD AUTO: 78.7 % — HIGH (ref 43–77)
NRBC # BLD: 0 /100 WBCS — SIGNIFICANT CHANGE UP
NRBC # FLD: 0 K/UL — SIGNIFICANT CHANGE UP
PHOSPHATE SERPL-MCNC: 3.1 MG/DL — SIGNIFICANT CHANGE UP (ref 2.5–4.5)
PLATELET # BLD AUTO: 237 K/UL — SIGNIFICANT CHANGE UP (ref 150–400)
POTASSIUM SERPL-MCNC: 3.9 MMOL/L — SIGNIFICANT CHANGE UP (ref 3.5–5.3)
POTASSIUM SERPL-SCNC: 3.9 MMOL/L — SIGNIFICANT CHANGE UP (ref 3.5–5.3)
RBC # BLD: 4.22 M/UL — SIGNIFICANT CHANGE UP (ref 3.8–5.2)
RBC # FLD: 14.7 % — HIGH (ref 10.3–14.5)
SODIUM SERPL-SCNC: 136 MMOL/L — SIGNIFICANT CHANGE UP (ref 135–145)
WBC # BLD: 10.94 K/UL — HIGH (ref 3.8–10.5)
WBC # FLD AUTO: 10.94 K/UL — HIGH (ref 3.8–10.5)

## 2022-05-28 RX ORDER — ALPRAZOLAM 0.25 MG
0.5 TABLET ORAL ONCE
Refills: 0 | Status: DISCONTINUED | OUTPATIENT
Start: 2022-05-28 | End: 2022-05-31

## 2022-05-28 RX ORDER — ESCITALOPRAM OXALATE 10 MG/1
15 TABLET, FILM COATED ORAL DAILY
Refills: 0 | Status: DISCONTINUED | OUTPATIENT
Start: 2022-05-28 | End: 2022-05-28

## 2022-05-28 RX ORDER — LEVOTHYROXINE SODIUM 125 MCG
100 TABLET ORAL AT BEDTIME
Refills: 0 | Status: DISCONTINUED | OUTPATIENT
Start: 2022-05-28 | End: 2022-05-28

## 2022-05-28 RX ORDER — LEVOTHYROXINE SODIUM 125 MCG
100 TABLET ORAL
Refills: 0 | Status: ACTIVE | OUTPATIENT
Start: 2022-05-29 | End: 2023-04-27

## 2022-05-28 RX ORDER — DEXTROSE MONOHYDRATE, SODIUM CHLORIDE, AND POTASSIUM CHLORIDE 50; .745; 4.5 G/1000ML; G/1000ML; G/1000ML
1000 INJECTION, SOLUTION INTRAVENOUS
Refills: 0 | Status: DISCONTINUED | OUTPATIENT
Start: 2022-05-28 | End: 2022-06-02

## 2022-05-28 RX ORDER — LEVOTHYROXINE SODIUM 125 MCG
137 TABLET ORAL DAILY
Refills: 0 | Status: DISCONTINUED | OUTPATIENT
Start: 2022-05-28 | End: 2022-05-28

## 2022-05-28 RX ORDER — KETOROLAC TROMETHAMINE 30 MG/ML
15 SYRINGE (ML) INJECTION ONCE
Refills: 0 | Status: DISCONTINUED | OUTPATIENT
Start: 2022-05-28 | End: 2022-05-28

## 2022-05-28 RX ORDER — ESCITALOPRAM OXALATE 10 MG/1
15 TABLET, FILM COATED ORAL DAILY
Refills: 0 | Status: ACTIVE | OUTPATIENT
Start: 2022-05-28 | End: 2023-04-26

## 2022-05-28 RX ORDER — KETOROLAC TROMETHAMINE 30 MG/ML
15 SYRINGE (ML) INJECTION EVERY 4 HOURS
Refills: 0 | Status: DISCONTINUED | OUTPATIENT
Start: 2022-05-28 | End: 2022-05-29

## 2022-05-28 RX ORDER — LEVOTHYROXINE SODIUM 125 MCG
100 TABLET ORAL ONCE
Refills: 0 | Status: COMPLETED | OUTPATIENT
Start: 2022-05-28 | End: 2022-05-28

## 2022-05-28 RX ADMIN — ENOXAPARIN SODIUM 40 MILLIGRAM(S): 100 INJECTION SUBCUTANEOUS at 18:06

## 2022-05-28 RX ADMIN — HYDROMORPHONE HYDROCHLORIDE 30 MILLILITER(S): 2 INJECTION INTRAMUSCULAR; INTRAVENOUS; SUBCUTANEOUS at 07:54

## 2022-05-28 RX ADMIN — Medication 100 MICROGRAM(S): at 13:37

## 2022-05-28 RX ADMIN — Medication 400 MILLIGRAM(S): at 06:36

## 2022-05-28 RX ADMIN — Medication 15 MILLIGRAM(S): at 18:06

## 2022-05-28 RX ADMIN — Medication 15 MILLIGRAM(S): at 23:17

## 2022-05-28 RX ADMIN — PANTOPRAZOLE SODIUM 40 MILLIGRAM(S): 20 TABLET, DELAYED RELEASE ORAL at 11:49

## 2022-05-28 RX ADMIN — KETOTIFEN FUMARATE 1 DROP(S): 0.34 SOLUTION OPHTHALMIC at 12:03

## 2022-05-28 RX ADMIN — ESCITALOPRAM OXALATE 15 MILLIGRAM(S): 10 TABLET, FILM COATED ORAL at 13:24

## 2022-05-28 RX ADMIN — HYDROMORPHONE HYDROCHLORIDE 30 MILLILITER(S): 2 INJECTION INTRAMUSCULAR; INTRAVENOUS; SUBCUTANEOUS at 19:27

## 2022-05-28 RX ADMIN — Medication 400 MILLIGRAM(S): at 11:50

## 2022-05-28 RX ADMIN — Medication 1000 MILLIGRAM(S): at 12:31

## 2022-05-28 RX ADMIN — Medication 400 MILLIGRAM(S): at 00:14

## 2022-05-28 RX ADMIN — Medication 15 MILLIGRAM(S): at 18:45

## 2022-05-28 NOTE — PHYSICAL THERAPY INITIAL EVALUATION ADULT - WEIGHT-BEARING RESTRICTIONS: GAIT, REHAB EVAL
After Visit Summary   4/13/2017    Doirta Gilmore    MRN: [id number]           Patient Information     Date Of Birth          2010        Visit Information        Provider Department      4/13/2017 10:15 AM Lydia Rojo APRN CNP Peds Hematology Oncology        Today's Diagnoses     B-cell acute lymphoblastic leukemia (H)    -  1          [facility], 9th floor  [address] [place] [id number]  Phone: [phone]  Clinic Hours:   Monday-Friday:   7 am to 5:00 pm   closed weekends and major  holidays     If your fever is 100.5  or greater,   call the clinic during business hours.   After hours call [phone] and ask for the pediatric hematology / oncology physician to be paged for you.               Follow-ups after your visit        Follow-up notes from your care team     Return for as scheduled.      Your next 10 appointments already scheduled     Apr 18, 2017  8:30 AM CDT   Return Visit with Shannan Wilkerson MD   Peds Hematology Oncology ([facility])    [facility]  [address]th [address] [address] [phone]            Apr 20, 2017 11:00 AM CDT   Return Visit with NONI Andujar CNP   Peds Hematology Oncology ([facility])    [address] [address] [phone]            Apr 20, 2017 11:00 AM CDT   [facility] Peds Infusion 60 with [facility] PEDS INFUSION CHAIR 9   Peds IV Infusion ([facility])    [address] [address] [phone]            Apr 25, 2017  9:00 AM CDT   Return Visit with Shannan Wilkerson MD   Peds Hematology Oncology ([facility])    [address] [address] [phone]            Apr 27, 2017  1:45 PM CDT   Return Visit with NONI Andujar CNP   Peds Hematology  Oncology ([facility])    [facility]  9th Floor  [address] [place] [address]   [phone]            Apr 27, 2017   Procedure with NONI Andujar CNP   [facility] Sedation Observation ([facility])    [address] [place] [id number] [phone]           The [facility] is located in the [facility]. lt is easily accessible from virtually any point in the [facility] area, via [address]            May 02, 2017  8:30 AM CDT   Return Visit with Shannan Wilkerson MD   Peds Hematology Oncology ([facility])    [place] [address] [id number] [phone]            May 02, 2017   Procedure with Mary Jane Freeman MD   [facility] Sedation Observation ([facility])    [address] [place] [id number] [phone]           The [facility] is located in the [facility]. lt is easily accessible from virtually any point in the [facility] area, via [address]            May 02, 2017 11:00 AM CDT   [facility] Peds Infusion 60 with [facility] PEDS INFUSION CHAIR 9   Peds IV Infusion ([facility])    [place] [address] [address] [phone]            May 09, 2017  8:30 AM CDT   Return Visit with NONI Andujar CNP   Peds Hematology Oncology ([facility])    [place] [address]  [address] [place] [address] [phone]              Future tests that were ordered for you today     Open Standing Orders        Priority Remaining Interval Expires Ordered    Red blood cell prepare order unit Routine 99/100 CONDITIONAL (SPECIFY) BLOOD  4/13/2017          Open Future Orders        Priority Expected Expires Ordered    CHROMOSOME BONE MARROW With Professional Interpretation Routine 4/27/2017 10/7/2017  4/10/2017    Bone marrow biopsy Routine 4/27/2017 10/7/2017 4/10/2017    Leukemia Lymphoma Evaluation (Flow Cytometry) Routine 4/27/2017 10/7/2017 4/10/2017    CBC with platelets differential Routine 4/25/2017 4/25/2017 4/10/2017    CBC with platelets differential Routine 4/18/2017 4/18/2017 4/10/2017            Who to contact     Please call your clinic at [phone] to:    Ask questions about your health    Make or cancel appointments    Discuss your medicines    Learn about your test results    Speak to your doctor   If you have compliments or concerns about an experience at your clinic, or if you wish to file a complaint, please contact [facility] Patient Relations at [phone] or email us at [email or web address]         Additional Information About Your Visit        MyChart Information     [employer] is an electronic gateway that provides easy, online access to your medical records. With [employer], you can request a clinic appointment, read your test results, renew a prescription or communicate with your care team.     To sign up for [employer], please contact your [facility] Clinic or call [phone] for assistance.           Care EveryWhere ID     This is your Care EveryWhere ID. This could be used by other organizations to access your [place] medical records  [id number]         Blood Pressure from Last 3 Encounters:   04/13/17 95/74   04/11/17 110/61   04/11/17 111/88    Weight from Last 3 Encounters:   04/13/17 25.2 kg (55 lb 8.9 oz) (96 %)*   04/11/17 24.2 kg (53 lb 5.6 oz) (94 %)*   04/11/17 24.2 kg (53 lb 5.6 oz) (94 %)*     * Growth percentiles are based on CDC 2-20 Years data.                 Today's Medication Changes          These changes are accurate as of: 4/13/17 12:33 PM.  If you have any questions, ask your nurse or doctor.               Stop taking these medicines if you haven't already. Please contact your care team if you have  questions.     acetaminophen 32 mg/mL solution   Commonly known as:  TYLENOL   Stopped by:  Lydia Rojo APRN CNP                    Primary Care Provider Office Phone # Fax #    Utpal U MD Fidelia [phone]       [facility] [address] [place] [address]        Thank you!     Thank you for choosing PEDS HEMATOLOGY ONCOLOGY  for your care. Our goal is always to provide you with excellent care. Hearing back from our patients is one way we can continue to improve our services. Please take a few minutes to complete the written survey that you may receive in the mail after your visit with us. Thank you!             Your Updated Medication List - Protect others around you: Learn how to safely use, store and throw away your medicines at www.disposemymeds.org.          This list is accurate as of: 4/13/17 12:33 PM.  Always use your most recent med list.                   Brand Name Dispense Instructions for use    dexamethasone 1 MG/ML alcohol free solution    DECADRON    124.8 mL    Take 2.6 mLs (2.6 mg) by mouth 2 times daily (with meals) for 48 doses       diphenhydrAMINE 12.5 MG/5ML liquid    BENADRYL CHILDRENS ALLERGY    118 mL    Take 5 mLs (12.5 mg) by mouth 4 times daily as needed for sleep (nausea or vomiting)       lidocaine-prilocaine cream    EMLA    30 g    Apply topically as needed for moderate pain       ondansetron 4 MG/5ML solution    ZOFRAN    90 mL    Take 5 mLs (4 mg) by mouth 2 times daily as needed for nausea or vomiting       oxyCODONE 5 MG/5ML solution    ROXICODONE    40 mL    Take 2.5 mLs (2.5 mg) by mouth every 4 hours as needed for breakthrough pain or moderate to severe pain       polyethylene glycol Packet    MIRALAX/GLYCOLAX    30 packet    Take 17 g by mouth daily as needed for constipation       ranitidine 15 MG/ML syrup    ZANTAC    120 mL    Take 3 mLs (45 mg) by mouth 2 times daily       sulfamethoxazole-trimethoprim suspension     BACTRIM/SEPTRA    100 mL    Take 7.5 mLs (60 mg) by mouth Every Mon, Tues two times daily Dose based on TMP component.          full weight-bearing

## 2022-05-28 NOTE — PHYSICAL THERAPY INITIAL EVALUATION ADULT - PERTINENT HX OF CURRENT PROBLEM, REHAB EVAL
47 y/o Female presents to presurgical testing with diagnosis of malignant neoplasm of pancreas scheduled for diagnostic laparoscopy distal pancreatectomy splenectomy possible left adrenalectomy. Pt was already treated with chemotherapy and radiation

## 2022-05-28 NOTE — PROGRESS NOTE ADULT - SUBJECTIVE AND OBJECTIVE BOX
ANESTHESIA POSTOP CHECK    46y Female POSTOP DAY 1      Vital Signs Last 24 Hrs  T(C): 36.6 (28 May 2022 16:41), Max: 37.2 (28 May 2022 06:07)  T(F): 97.9 (28 May 2022 16:41), Max: 99 (28 May 2022 06:07)  HR: 70 (28 May 2022 16:41) (66 - 76)  BP: 104/59 (28 May 2022 16:41) (97/62 - 104/59)  BP(mean): --  RR: 16 (28 May 2022 16:41) (16 - 16)  SpO2: 96% (28 May 2022 16:41) (95% - 98%)  I&O's Summary    27 May 2022 07:01  -  28 May 2022 07:00  --------------------------------------------------------  IN: 700 mL / OUT: 910 mL / NET: -210 mL    28 May 2022 07:01  -  28 May 2022 20:15  --------------------------------------------------------  IN: 800 mL / OUT: 1300 mL / NET: -500 mL        [X ] NO APPARENT ANESTHESIA COMPLICATIONS      Comments:

## 2022-05-28 NOTE — PROGRESS NOTE ADULT - ASSESSMENT
ASSESSMENT/PLAN: BOUCHRA PALACIOS 46y Female s/p  distal panrectomy, splenectomy, left adrenalectomy  - Diet: NPO  - NGT  - Pain control:   - Hydrocortisone  - Input and outputs   - C/w prevena  - DVT ppx  - OOB/incentive spirometry     D Team Surgery Post-Op Note [id number]. ASSESSMENT/PLAN: BOUCHRA PALACIOS 46y Female s/p  distal panc, splenectomy, left adrenalectomy      - Diet: NPO  - NGT to LCWS  - Pain control  - Hydrocortisone, appreciate Endo recs  - Input and outputs   - C/w prevena  - DVT ppx  - Baldwin out, f/u TOV  - OOB/incentive spirometry     D Team Surgery   #[id number] ASSESSMENT/PLAN: BOUCHRA PALACIOS 46y Female s/p  distal panc, splenectomy, left adrenalectomy      - Diet: NPO  - NGT to LCWS  - Pain control  - Hydrocortisone, appreciate Endo recs  - Input and outputs   - C/w prevena  - DVT ppx  - Baldwin out, f/u TOV  - OOB/incentive spirometry     D Team Surgery   #[id number]    Seen at bedside with Dr. Daigle.

## 2022-05-28 NOTE — PHYSICAL THERAPY INITIAL EVALUATION ADULT - ACTIVE RANGE OF MOTION EXAMINATION, REHAB EVAL
no Active ROM deficits were identified/vinny. upper extremity Active ROM was WNL (within normal limits)

## 2022-05-28 NOTE — PROGRESS NOTE ADULT - SUBJECTIVE AND OBJECTIVE BOX
Anesthesia Pain Management Service    SUBJECTIVE: Patient is doing well with IV PCA and no significant problems reported. Patient states she is uncomfortable from the NG tube but the incision pain is managed well with the IV PCA.    Pain Scale Score	At rest: __4/10_ 	With Activity: ___ 	[X ] Refer to charted pain scores    THERAPY:    [ ] IV PCA Morphine		[ ] 5 mg/mL	[ ] 1 mg/mL  [X ] IV PCA Hydromorphone	[ ] 5 mg/mL	[X ] 1 mg/mL  [ ] IV PCA Fentanyl		[ ] 50 micrograms/mL    Demand dose __0.2_ lockout __6_ (minutes) Continuous Rate _0__ Total: 4.2___  mg used (in past 24 hours)      MEDICATIONS  (STANDING):  acetaminophen   IVPB .. 1000 milliGRAM(s) IV Intermittent every 6 hours  diphenhydrAMINE Injectable 25 milliGRAM(s) IV Push once  enoxaparin Injectable 40 milliGRAM(s) SubCutaneous every 24 hours  escitalopram 15 milliGRAM(s) Oral daily  HYDROmorphone PCA (1 mG/mL) 30 milliLiter(s) PCA Continuous PCA Continuous  ketotifen 0.025% Ophthalmic Solution - Peds      ketotifen 0.025% Ophthalmic Solution - Peds 1 Drop(s) Both EYES daily  lactated ringers. 1000 milliLiter(s) (100 mL/Hr) IV Continuous <Continuous>  levothyroxine 137 MICROGram(s) Oral daily  pantoprazole  Injectable 40 milliGRAM(s) IV Push daily    MEDICATIONS  (PRN):  ALPRAZolam 0.5 milliGRAM(s) Oral once PRN anxiety  naloxone Injectable 0.1 milliGRAM(s) IV Push every 3 minutes PRN For ANY of the following changes in patient status:  A. RR LESS THAN 10 breaths per minute, B. Oxygen saturation LESS THAN 90%, C. Sedation score of 6  ondansetron Injectable 4 milliGRAM(s) IV Push every 6 hours PRN Nausea      OBJECTIVE:  Patient sitting up in bed. NG tube in place.    Sedation Score:	[ X] Alert	[ ] Drowsy 	[ ] Arousable	[ ] Asleep	[ ] Unresponsive    Side Effects:	[X ] None	[ ] Nausea	[ ] Vomiting	[ ] Pruritus  		[ ] Other:    Vital Signs Last 24 Hrs  T(C): 37.2 (28 May 2022 06:07), Max: 37.2 (28 May 2022 06:07)  T(F): 99 (28 May 2022 06:07), Max: 99 (28 May 2022 06:07)  HR: 76 (28 May 2022 06:07) (69 - 86)  BP: 101/56 (28 May 2022 06:07) (91/46 - 115/69)  BP(mean): 64 (27 May 2022 19:00) (55 - 90)  RR: 16 (28 May 2022 06:07) (11 - 20)  SpO2: 98% (28 May 2022 06:07) (92% - 100%)    ASSESSMENT/ PLAN    Therapy to  be:	[ X] Continue   [ ] Discontinued   [ ] Change to prn Analgesics    Documentation and Verification of current medications:   [X] Done	[ ] Not done, not elligible    Comments: Continue IV PCA. Recommend non-opioid adjuvant analgesics to be used when possible and when allowed by primary surgical team.    Progress Note written now but Patient was seen earlier.

## 2022-05-28 NOTE — PATIENT PROFILE ADULT - FALL HARM RISK - UNIVERSAL INTERVENTIONS
Bed in lowest position, wheels locked, appropriate side rails in place/Call bell, personal items and telephone in reach/Instruct patient to call for assistance before getting out of bed or chair/Non-slip footwear when patient is out of bed/[place] to call system/Physically safe environment - no spills, clutter or unnecessary equipment/Purposeful Proactive Rounding/Room/bathroom lighting operational, light cord in reach

## 2022-05-28 NOTE — PROGRESS NOTE ADULT - SUBJECTIVE AND OBJECTIVE BOX
Surgical Oncology    SUBJECTIVE: Pt seen and examined on rounds with team. No acute events overnight, transferred from PACu overnight, stable      OBJECTIVE    OBJECTIVE:  PHYSICAL EXAM:  GA: NAD  HEENT: NGT in place  Abdomen:  -  soft, non-distended, appropriate incisional tenderness  prevena in place  : Baldwin        VITALS  T(C): 36.7 (05-27-22 @ 20:50), Max: 37 (05-27-22 @ 19:00)  HR: 69 (05-27-22 @ 20:50) (69 - 86)  BP: 101/61 (05-27-22 @ 20:50) (91/46 - 115/69)  RR: 16 (05-27-22 @ 20:50) (11 - 20)  SpO2: 97% (05-27-22 @ 20:50) (92% - 100%)  CAPILLARY BLOOD GLUCOSE          Is/Os    05-27 @ 07:01  -  05-28 @ 00:43  --------------------------------------------------------  IN:    IV PiggyBack: 100 mL    Lactated Ringers: 600 mL  Total IN: 700 mL    OUT:    Indwelling Catheter - Urethral (mL): 610 mL    Nasogastric/Oral tube (mL): 0 mL    Oral Fluid: 0 mL  Total OUT: 610 mL    Total NET: 90 mL          MEDICATIONS (STANDING): acetaminophen   IVPB .. 1000 milliGRAM(s) IV Intermittent every 6 hours  diphenhydrAMINE Injectable 25 milliGRAM(s) IV Push once  enoxaparin Injectable 40 milliGRAM(s) SubCutaneous every 24 hours  HYDROmorphone PCA (1 mG/mL) 30 milliLiter(s) PCA Continuous PCA Continuous  lactated ringers. 1000 milliLiter(s) IV Continuous <Continuous>  lactated ringers. 1000 milliLiter(s) IV Continuous <Continuous>  pantoprazole  Injectable 40 milliGRAM(s) IV Push daily    MEDICATIONS (PRN):naloxone Injectable 0.1 milliGRAM(s) IV Push every 3 minutes PRN For ANY of the following changes in patient status:  A. RR LESS THAN 10 breaths per minute, B. Oxygen saturation LESS THAN 90%, C. Sedation score of 6  ondansetron Injectable 4 milliGRAM(s) IV Push every 6 hours PRN Nausea      LABS  CBC (05-27 @ 14:20)                              13.4                           12.56<H>  )----------------(  219        89.3<H>% Neutrophils, 5.2<L>% Lymphocytes, ANC: 11.21<H>                              40.4      BMP (05-27 @ 14:44)             136     |  103     |  11    		Ca++ --      Ca 8.2<L>             ---------------------------------( 144<H>		Mg --                 3.8     |  22      |  0.65  			Ph --        LFTs (05-27 @ 14:44)      TPro 6.5 / Alb 3.7 / TBili 1.7<H> / DBili -- / AST 46<H> / ALT 37<H> / AlkPhos 105        ABG (05-27 @ 10:15)     7.44 / 36<H> / 172<H> / 24 / 0.6 / 99.0<H>%     Lactate:          IMAGING STUDIES     Surgical Oncology    SUBJECTIVE: Pt seen and examined on rounds with team. POD 1. No acute events overnight, transferred from PACU overnight in stable condition. NGT noted at bedside to be on 0 pressure, without drainage. UOP [id number]      OBJECTIVE    OBJECTIVE:  PHYSICAL EXAM:  GA: NAD  HEENT: NGT in place  Abdomen: soft, non-distended, appropriate incisional tenderness  prevena in place  Chest- CVC tunneled port  : Baldwin        VITALS  T(C): 36.7 (05-27-22 @ 20:50), Max: 37 (05-27-22 @ 19:00)  HR: 69 (05-27-22 @ 20:50) (69 - 86)  BP: 101/61 (05-27-22 @ 20:50) (91/46 - 115/69)  RR: 16 (05-27-22 @ 20:50) (11 - 20)  SpO2: 97% (05-27-22 @ 20:50) (92% - 100%)  CAPILLARY BLOOD GLUCOSE          Is/Os    05-27 @ 07:01  -  05-28 @ 00:43  --------------------------------------------------------  IN:    IV PiggyBack: 100 mL    Lactated Ringers: 600 mL  Total IN: 700 mL    OUT:    Indwelling Catheter - Urethral (mL): 610 mL    Nasogastric/Oral tube (mL): 0 mL    Oral Fluid: 0 mL  Total OUT: 610 mL    Total NET: 90 mL          MEDICATIONS (STANDING): acetaminophen   IVPB .. 1000 milliGRAM(s) IV Intermittent every 6 hours  diphenhydrAMINE Injectable 25 milliGRAM(s) IV Push once  enoxaparin Injectable 40 milliGRAM(s) SubCutaneous every 24 hours  HYDROmorphone PCA (1 mG/mL) 30 milliLiter(s) PCA Continuous PCA Continuous  lactated ringers. 1000 milliLiter(s) IV Continuous <Continuous>  lactated ringers. 1000 milliLiter(s) IV Continuous <Continuous>  pantoprazole  Injectable 40 milliGRAM(s) IV Push daily    MEDICATIONS (PRN):naloxone Injectable 0.1 milliGRAM(s) IV Push every 3 minutes PRN For ANY of the following changes in patient status:  A. RR LESS THAN 10 breaths per minute, B. Oxygen saturation LESS THAN 90%, C. Sedation score of 6  ondansetron Injectable 4 milliGRAM(s) IV Push every 6 hours PRN Nausea      LABS  CBC (05-27 @ 14:20)                              13.4                           12.56<H>  )----------------(  219        89.3<H>% Neutrophils, 5.2<L>% Lymphocytes, ANC: 11.21<H>                              40.4      BMP (05-27 @ 14:44)             136     |  103     |  11    		Ca++ --      Ca 8.2<L>             ---------------------------------( 144<H>		Mg --                 3.8     |  22      |  0.65  			Ph --        LFTs (05-27 @ 14:44)      TPro 6.5 / Alb 3.7 / TBili 1.7<H> / DBili -- / AST 46<H> / ALT 37<H> / AlkPhos 105        ABG (05-27 @ 10:15)     7.44 / 36<H> / 172<H> / 24 / 0.6 / 99.0<H>%     Lactate:          IMAGING STUDIES

## 2022-05-29 LAB
ALBUMIN SERPL ELPH-MCNC: 3.2 G/DL — LOW (ref 3.3–5)
ALP SERPL-CCNC: 87 U/L — SIGNIFICANT CHANGE UP (ref 40–120)
ALT FLD-CCNC: 34 U/L — HIGH (ref 4–33)
ANION GAP SERPL CALC-SCNC: 10 MMOL/L — SIGNIFICANT CHANGE UP (ref 7–14)
AST SERPL-CCNC: 68 U/L — HIGH (ref 4–32)
BILIRUB SERPL-MCNC: 0.9 MG/DL — SIGNIFICANT CHANGE UP (ref 0.2–1.2)
BUN SERPL-MCNC: 8 MG/DL — SIGNIFICANT CHANGE UP (ref 7–23)
CALCIUM SERPL-MCNC: 8.4 MG/DL — SIGNIFICANT CHANGE UP (ref 8.4–10.5)
CHLORIDE SERPL-SCNC: 101 MMOL/L — SIGNIFICANT CHANGE UP (ref 98–107)
CO2 SERPL-SCNC: 26 MMOL/L — SIGNIFICANT CHANGE UP (ref 22–31)
CREAT SERPL-MCNC: 0.58 MG/DL — SIGNIFICANT CHANGE UP (ref 0.5–1.3)
EGFR: 114 ML/MIN/1.73M2 — SIGNIFICANT CHANGE UP
GLUCOSE SERPL-MCNC: 120 MG/DL — HIGH (ref 70–99)
HCT VFR BLD CALC: 38.8 % — SIGNIFICANT CHANGE UP (ref 34.5–45)
HGB BLD-MCNC: 12.5 G/DL — SIGNIFICANT CHANGE UP (ref 11.5–15.5)
MAGNESIUM SERPL-MCNC: 2 MG/DL — SIGNIFICANT CHANGE UP (ref 1.6–2.6)
MCHC RBC-ENTMCNC: 31.2 PG — SIGNIFICANT CHANGE UP (ref 27–34)
MCHC RBC-ENTMCNC: 32.2 GM/DL — SIGNIFICANT CHANGE UP (ref 32–36)
MCV RBC AUTO: 96.8 FL — SIGNIFICANT CHANGE UP (ref 80–100)
NRBC # BLD: 0 /100 WBCS — SIGNIFICANT CHANGE UP
NRBC # FLD: 0 K/UL — SIGNIFICANT CHANGE UP
PHOSPHATE SERPL-MCNC: 2.2 MG/DL — LOW (ref 2.5–4.5)
PLATELET # BLD AUTO: 258 K/UL — SIGNIFICANT CHANGE UP (ref 150–400)
POTASSIUM SERPL-MCNC: 3.5 MMOL/L — SIGNIFICANT CHANGE UP (ref 3.5–5.3)
POTASSIUM SERPL-SCNC: 3.5 MMOL/L — SIGNIFICANT CHANGE UP (ref 3.5–5.3)
PROT SERPL-MCNC: 6.2 G/DL — SIGNIFICANT CHANGE UP (ref 6–8.3)
RBC # BLD: 4.01 M/UL — SIGNIFICANT CHANGE UP (ref 3.8–5.2)
RBC # FLD: 14.7 % — HIGH (ref 10.3–14.5)
SODIUM SERPL-SCNC: 137 MMOL/L — SIGNIFICANT CHANGE UP (ref 135–145)
WBC # BLD: 10.32 K/UL — SIGNIFICANT CHANGE UP (ref 3.8–10.5)
WBC # FLD AUTO: 10.32 K/UL — SIGNIFICANT CHANGE UP (ref 3.8–10.5)

## 2022-05-29 RX ORDER — POTASSIUM CHLORIDE 20 MEQ
10 PACKET (EA) ORAL
Refills: 0 | Status: DISCONTINUED | OUTPATIENT
Start: 2022-05-29 | End: 2022-05-29

## 2022-05-29 RX ORDER — POTASSIUM PHOSPHATE, MONOBASIC POTASSIUM PHOSPHATE, DIBASIC 236; 224 MG/ML; MG/ML
15 INJECTION, SOLUTION INTRAVENOUS ONCE
Refills: 0 | Status: COMPLETED | OUTPATIENT
Start: 2022-05-29 | End: 2022-05-29

## 2022-05-29 RX ORDER — POTASSIUM CHLORIDE 20 MEQ
10 PACKET (EA) ORAL
Refills: 0 | Status: COMPLETED | OUTPATIENT
Start: 2022-05-29 | End: 2022-05-29

## 2022-05-29 RX ORDER — ACETAMINOPHEN 500 MG
1000 TABLET ORAL EVERY 6 HOURS
Refills: 0 | Status: COMPLETED | OUTPATIENT
Start: 2022-05-29 | End: 2022-05-30

## 2022-05-29 RX ORDER — ACETAMINOPHEN 500 MG
1000 TABLET ORAL EVERY 6 HOURS
Refills: 0 | Status: DISCONTINUED | OUTPATIENT
Start: 2022-05-30 | End: 2022-05-31

## 2022-05-29 RX ADMIN — Medication 400 MILLIGRAM(S): at 12:32

## 2022-05-29 RX ADMIN — DEXTROSE MONOHYDRATE, SODIUM CHLORIDE, AND POTASSIUM CHLORIDE 100 MILLILITER(S): 50; .745; 4.5 INJECTION, SOLUTION INTRAVENOUS at 21:26

## 2022-05-29 RX ADMIN — Medication 100 MILLIEQUIVALENT(S): at 10:12

## 2022-05-29 RX ADMIN — ENOXAPARIN SODIUM 40 MILLIGRAM(S): 100 INJECTION SUBCUTANEOUS at 18:56

## 2022-05-29 RX ADMIN — Medication 100 MICROGRAM(S): at 06:19

## 2022-05-29 RX ADMIN — POTASSIUM PHOSPHATE, MONOBASIC POTASSIUM PHOSPHATE, DIBASIC 62.5 MILLIMOLE(S): 236; 224 INJECTION, SOLUTION INTRAVENOUS at 19:05

## 2022-05-29 RX ADMIN — Medication 400 MILLIGRAM(S): at 18:58

## 2022-05-29 RX ADMIN — Medication 400 MILLIGRAM(S): at 23:16

## 2022-05-29 RX ADMIN — HYDROMORPHONE HYDROCHLORIDE 30 MILLILITER(S): 2 INJECTION INTRAMUSCULAR; INTRAVENOUS; SUBCUTANEOUS at 19:33

## 2022-05-29 RX ADMIN — Medication 100 MILLIEQUIVALENT(S): at 14:00

## 2022-05-29 RX ADMIN — Medication 1000 MILLIGRAM(S): at 12:32

## 2022-05-29 RX ADMIN — Medication 1000 MILLIGRAM(S): at 23:16

## 2022-05-29 RX ADMIN — ESCITALOPRAM OXALATE 15 MILLIGRAM(S): 10 TABLET, FILM COATED ORAL at 12:39

## 2022-05-29 RX ADMIN — HYDROMORPHONE HYDROCHLORIDE 30 MILLILITER(S): 2 INJECTION INTRAMUSCULAR; INTRAVENOUS; SUBCUTANEOUS at 07:38

## 2022-05-29 RX ADMIN — Medication 100 MILLIEQUIVALENT(S): at 12:30

## 2022-05-29 RX ADMIN — PANTOPRAZOLE SODIUM 40 MILLIGRAM(S): 20 TABLET, DELAYED RELEASE ORAL at 12:33

## 2022-05-29 RX ADMIN — Medication 1000 MILLIGRAM(S): at 18:58

## 2022-05-29 RX ADMIN — Medication 15 MILLIGRAM(S): at 06:19

## 2022-05-29 RX ADMIN — Medication 15 MILLIGRAM(S): at 07:00

## 2022-05-29 NOTE — PROGRESS NOTE ADULT - SUBJECTIVE AND OBJECTIVE BOX
Anesthesia Pain Management Service    SUBJECTIVE: Patient is doing well with IV PCA and no significant problems reported.    Pain Scale Score	At rest: ___ 	With Activity: ___ 	[X ] Refer to charted pain scores    THERAPY:    [ ] IV PCA Morphine		[ ] 5 mg/mL	[ ] 1 mg/mL  [X ] IV PCA Hydromorphone	[ ] 5 mg/mL	[X ] 1 mg/mL  [ ] IV PCA Fentanyl		[ ] 50 micrograms/mL    Demand dose __0.2_ lockout __6_ (minutes) Continuous Rate _0__ Total: 4.0mg___  Daily      MEDICATIONS  (STANDING):  dextrose 5% + sodium chloride 0.45% with potassium chloride 20 mEq/L 1000 milliLiter(s) (100 mL/Hr) IV Continuous <Continuous>  diphenhydrAMINE Injectable 25 milliGRAM(s) IV Push once  enoxaparin Injectable 40 milliGRAM(s) SubCutaneous every 24 hours  escitalopram Solution 15 milliGRAM(s) Oral daily  HYDROmorphone PCA (1 mG/mL) 30 milliLiter(s) PCA Continuous PCA Continuous  ketotifen 0.025% Ophthalmic Solution - Peds      ketotifen 0.025% Ophthalmic Solution - Peds 1 Drop(s) Both EYES daily  levothyroxine Injectable 100 MICROGram(s) IV Push <User Schedule>  pantoprazole  Injectable 40 milliGRAM(s) IV Push daily    MEDICATIONS  (PRN):  ALPRAZolam 0.5 milliGRAM(s) Oral once PRN anxiety  naloxone Injectable 0.1 milliGRAM(s) IV Push every 3 minutes PRN For ANY of the following changes in patient status:  A. RR LESS THAN 10 breaths per minute, B. Oxygen saturation LESS THAN 90%, C. Sedation score of 6  ondansetron Injectable 4 milliGRAM(s) IV Push every 6 hours PRN Nausea      OBJECTIVE:    Sedation Score:	[ X] Alert	[ ] Drowsy 	[ ] Arousable	[ ] Asleep	[ ] Unresponsive    Side Effects:	[X ] None	[ ] Nausea	[ ] Vomiting	[ ] Pruritus  		[ ] Other:    Vital Signs Last 24 Hrs  T(C): 36.8 (29 May 2022 01:09), Max: 36.8 (29 May 2022 01:09)  T(F): 98.3 (29 May 2022 01:09), Max: 98.3 (29 May 2022 01:09)  HR: 71 (29 May 2022 01:09) (66 - 71)  BP: 102/60 (29 May 2022 01:09) (98/55 - 104/59)  BP(mean): --  RR: 17 (29 May 2022 01:09) (16 - 17)  SpO2: 97% (29 May 2022 01:09) (95% - 97%)    ASSESSMENT/ PLAN    Therapy to  be:	[ X] Continue   [ ] Discontinued   [ ] Change to prn Analgesics    Documentation and Verification of current medications:   [X] Done	[ ] Not done, not elligible    Comments:

## 2022-05-29 NOTE — PROGRESS NOTE ADULT - SUBJECTIVE AND OBJECTIVE BOX
Surgery Progress Note    Subjective:     Patient seen and examined at bedside. POD 2. Patient without complaints this AM. Denies N/V/F/C.     OBJECTIVE:     T(C): 36.8 (05-29-22 @ 01:09), Max: 37.2 (05-28-22 @ 06:07)  HR: 71 (05-29-22 @ 01:09) (66 - 76)  BP: 102/60 (05-29-22 @ 01:09) (98/55 - 104/59)  RR: 17 (05-29-22 @ 01:09) (16 - 17)  SpO2: 97% (05-29-22 @ 01:09) (95% - 98%)  Wt(kg): --    I&O's Detail    27 May 2022 07:01  -  28 May 2022 07:00  --------------------------------------------------------  IN:    IV PiggyBack: 100 mL    Lactated Ringers: 600 mL  Total IN: 700 mL    OUT:    Indwelling Catheter - Urethral (mL): 910 mL    Nasogastric/Oral tube (mL): 0 mL    Oral Fluid: 0 mL  Total OUT: 910 mL    Total NET: -210 mL      28 May 2022 07:01  -  29 May 2022 03:56  --------------------------------------------------------  IN:    dextrose 5% + sodium chloride 0.45% w/ Additives: 200 mL    Lactated Ringers: 600 mL  Total IN: 800 mL    OUT:    Indwelling Catheter - Urethral (mL): 150 mL    Nasogastric/Oral tube (mL): 900 mL    Voided (mL): 750 mL  Total OUT: 1800 mL    Total NET: -1000 mL          PHYSICAL EXAM:    GENERAL: NAD, lying in bed comfortably  HEAD:  Atraumatic, Normocephalic  ENT: Moist mucous membranes  CHEST/LUNG: Unlabored respirations  ABDOMEN: Soft, Nontender, Nondistended.   NERVOUS SYSTEM:  Alert & Oriented X3, speech clear.   SKIN: No rashes or lesions    MEDICATIONS  (STANDING):  dextrose 5% + sodium chloride 0.45% with potassium chloride 20 mEq/L 1000 milliLiter(s) (100 mL/Hr) IV Continuous <Continuous>  diphenhydrAMINE Injectable 25 milliGRAM(s) IV Push once  enoxaparin Injectable 40 milliGRAM(s) SubCutaneous every 24 hours  escitalopram Solution 15 milliGRAM(s) Oral daily  HYDROmorphone PCA (1 mG/mL) 30 milliLiter(s) PCA Continuous PCA Continuous  ketotifen 0.025% Ophthalmic Solution - Peds      ketotifen 0.025% Ophthalmic Solution - Peds 1 Drop(s) Both EYES daily  levothyroxine Injectable 100 MICROGram(s) IV Push <User Schedule>  pantoprazole  Injectable 40 milliGRAM(s) IV Push daily    MEDICATIONS  (PRN):  ALPRAZolam 0.5 milliGRAM(s) Oral once PRN anxiety  ketorolac   Injectable 15 milliGRAM(s) IV Push every 4 hours PRN Mild Pain (1 - 3)  naloxone Injectable 0.1 milliGRAM(s) IV Push every 3 minutes PRN For ANY of the following changes in patient status:  A. RR LESS THAN 10 breaths per minute, B. Oxygen saturation LESS THAN 90%, C. Sedation score of 6  ondansetron Injectable 4 milliGRAM(s) IV Push every 6 hours PRN Nausea      LABS:                          13.2   10.94 )-----------( 237      ( 28 May 2022 06:42 )             40.3     05-28    136  |  101  |  11  ----------------------------<  103<H>  3.9   |  25  |  0.60    Ca    8.3<L>      28 May 2022 06:42  Phos  3.1     05-28  Mg     2.20     05-28    TPro  6.5  /  Alb  3.7  /  TBili  1.7<H>  /  DBili  x   /  AST  46<H>  /  ALT  37<H>  /  AlkPhos  105  05-27               Surgery Progress Note    Subjective:     Patient seen and examined at bedside. POD 2. VSS, AF. Baldwin removed, passed TOV. Patient without complaints this AM. Denies N/V/F/C.     OBJECTIVE:     T(C): 36.8 (05-29-22 @ 01:09), Max: 37.2 (05-28-22 @ 06:07)  HR: 71 (05-29-22 @ 01:09) (66 - 76)  BP: 102/60 (05-29-22 @ 01:09) (98/55 - 104/59)  RR: 17 (05-29-22 @ 01:09) (16 - 17)  SpO2: 97% (05-29-22 @ 01:09) (95% - 98%)  Wt(kg): --    I&O's Detail    27 May 2022 07:01  -  28 May 2022 07:00  --------------------------------------------------------  IN:    IV PiggyBack: 100 mL    Lactated Ringers: 600 mL  Total IN: 700 mL    OUT:    Indwelling Catheter - Urethral (mL): 910 mL    Nasogastric/Oral tube (mL): 0 mL    Oral Fluid: 0 mL  Total OUT: 910 mL    Total NET: -210 mL      28 May 2022 07:01  -  29 May 2022 03:56  --------------------------------------------------------  IN:    dextrose 5% + sodium chloride 0.45% w/ Additives: 200 mL    Lactated Ringers: 600 mL  Total IN: 800 mL    OUT:    Indwelling Catheter - Urethral (mL): 150 mL    Nasogastric/Oral tube (mL): 900 mL    Voided (mL): 750 mL  Total OUT: 1800 mL    Total NET: -1000 mL          PHYSICAL EXAM:    GENERAL: NAD, lying in bed comfortably  HEAD:  Atraumatic, Normocephalic  ENT: Moist mucous membranes  CHEST/LUNG: Unlabored respirations  ABDOMEN: Soft, Nontender, Nondistended.   NERVOUS SYSTEM:  Alert & Oriented X3, speech clear.   SKIN: No rashes or lesions    MEDICATIONS  (STANDING):  dextrose 5% + sodium chloride 0.45% with potassium chloride 20 mEq/L 1000 milliLiter(s) (100 mL/Hr) IV Continuous <Continuous>  diphenhydrAMINE Injectable 25 milliGRAM(s) IV Push once  enoxaparin Injectable 40 milliGRAM(s) SubCutaneous every 24 hours  escitalopram Solution 15 milliGRAM(s) Oral daily  HYDROmorphone PCA (1 mG/mL) 30 milliLiter(s) PCA Continuous PCA Continuous  ketotifen 0.025% Ophthalmic Solution - Peds      ketotifen 0.025% Ophthalmic Solution - Peds 1 Drop(s) Both EYES daily  levothyroxine Injectable 100 MICROGram(s) IV Push <User Schedule>  pantoprazole  Injectable 40 milliGRAM(s) IV Push daily    MEDICATIONS  (PRN):  ALPRAZolam 0.5 milliGRAM(s) Oral once PRN anxiety  ketorolac   Injectable 15 milliGRAM(s) IV Push every 4 hours PRN Mild Pain (1 - 3)  naloxone Injectable 0.1 milliGRAM(s) IV Push every 3 minutes PRN For ANY of the following changes in patient status:  A. RR LESS THAN 10 breaths per minute, B. Oxygen saturation LESS THAN 90%, C. Sedation score of 6  ondansetron Injectable 4 milliGRAM(s) IV Push every 6 hours PRN Nausea      LABS:                          13.2   10.94 )-----------( 237      ( 28 May 2022 06:42 )             40.3     05-28    136  |  101  |  11  ----------------------------<  103<H>  3.9   |  25  |  0.60    Ca    8.3<L>      28 May 2022 06:42  Phos  3.1     05-28  Mg     2.20     05-28    TPro  6.5  /  Alb  3.7  /  TBili  1.7<H>  /  DBili  x   /  AST  46<H>  /  ALT  37<H>  /  AlkPhos  105  05-27               Surgery Progress Note    Subjective:     Patient seen and examined at bedside. POD 2. VSS, AF. Baldwin removed, passed TOV yesterday. -/- bowl function.     OBJECTIVE:     T(C): 36.8 (05-29-22 @ 01:09), Max: 37.2 (05-28-22 @ 06:07)  HR: 71 (05-29-22 @ 01:09) (66 - 76)  BP: 102/60 (05-29-22 @ 01:09) (98/55 - 104/59)  RR: 17 (05-29-22 @ 01:09) (16 - 17)  SpO2: 97% (05-29-22 @ 01:09) (95% - 98%)  Wt(kg): --    I&O's Detail    27 May 2022 07:01  -  28 May 2022 07:00  --------------------------------------------------------  IN:    IV PiggyBack: 100 mL    Lactated Ringers: 600 mL  Total IN: 700 mL    OUT:    Indwelling Catheter - Urethral (mL): 910 mL    Nasogastric/Oral tube (mL): 0 mL    Oral Fluid: 0 mL  Total OUT: 910 mL    Total NET: -210 mL      28 May 2022 07:01  -  29 May 2022 03:56  --------------------------------------------------------  IN:    dextrose 5% + sodium chloride 0.45% w/ Additives: 200 mL    Lactated Ringers: 600 mL  Total IN: 800 mL    OUT:    Indwelling Catheter - Urethral (mL): 150 mL    Nasogastric/Oral tube (mL): 900 mL    Voided (mL): 750 mL  Total OUT: 1800 mL    Total NET: -1000 mL          PHYSICAL EXAM:    GENERAL: NAD, lying in bed comfortably  Abdomen: Soft, non distended. No pain on palpation. Prevena on  HEENT- NGT in      MEDICATIONS  (STANDING):  dextrose 5% + sodium chloride 0.45% with potassium chloride 20 mEq/L 1000 milliLiter(s) (100 mL/Hr) IV Continuous <Continuous>  diphenhydrAMINE Injectable 25 milliGRAM(s) IV Push once  enoxaparin Injectable 40 milliGRAM(s) SubCutaneous every 24 hours  escitalopram Solution 15 milliGRAM(s) Oral daily  HYDROmorphone PCA (1 mG/mL) 30 milliLiter(s) PCA Continuous PCA Continuous  ketotifen 0.025% Ophthalmic Solution - Peds      ketotifen 0.025% Ophthalmic Solution - Peds 1 Drop(s) Both EYES daily  levothyroxine Injectable 100 MICROGram(s) IV Push <User Schedule>  pantoprazole  Injectable 40 milliGRAM(s) IV Push daily    MEDICATIONS  (PRN):  ALPRAZolam 0.5 milliGRAM(s) Oral once PRN anxiety  ketorolac   Injectable 15 milliGRAM(s) IV Push every 4 hours PRN Mild Pain (1 - 3)  naloxone Injectable 0.1 milliGRAM(s) IV Push every 3 minutes PRN For ANY of the following changes in patient status:  A. RR LESS THAN 10 breaths per minute, B. Oxygen saturation LESS THAN 90%, C. Sedation score of 6  ondansetron Injectable 4 milliGRAM(s) IV Push every 6 hours PRN Nausea      LABS:                          13.2   10.94 )-----------( 237      ( 28 May 2022 06:42 )             40.3     05-28    136  |  101  |  11  ----------------------------<  103<H>  3.9   |  25  |  0.60    Ca    8.3<L>      28 May 2022 06:42  Phos  3.1     05-28  Mg     2.20     05-28    TPro  6.5  /  Alb  3.7  /  TBili  1.7<H>  /  DBili  x   /  AST  46<H>  /  ALT  37<H>  /  AlkPhos  105  05-27               Surgery Progress Note    Subjective:     Patient seen and examined at bedside. POD 2. VSS, AF. Baldwin removed, passed TOV yesterday. -/- bowl function. patient stated she has been walking around.     OBJECTIVE:     T(C): 36.8 (05-29-22 @ 01:09), Max: 37.2 (05-28-22 @ 06:07)  HR: 71 (05-29-22 @ 01:09) (66 - 76)  BP: 102/60 (05-29-22 @ 01:09) (98/55 - 104/59)  RR: 17 (05-29-22 @ 01:09) (16 - 17)  SpO2: 97% (05-29-22 @ 01:09) (95% - 98%)  Wt(kg): --    I&O's Detail    27 May 2022 07:01  -  28 May 2022 07:00  --------------------------------------------------------  IN:    IV PiggyBack: 100 mL    Lactated Ringers: 600 mL  Total IN: 700 mL    OUT:    Indwelling Catheter - Urethral (mL): 910 mL    Nasogastric/Oral tube (mL): 0 mL    Oral Fluid: 0 mL  Total OUT: 910 mL    Total NET: -210 mL      28 May 2022 07:01  -  29 May 2022 03:56  --------------------------------------------------------  IN:    dextrose 5% + sodium chloride 0.45% w/ Additives: 200 mL    Lactated Ringers: 600 mL  Total IN: 800 mL    OUT:    Indwelling Catheter - Urethral (mL): 150 mL    Nasogastric/Oral tube (mL): 900 mL    Voided (mL): 750 mL  Total OUT: 1800 mL    Total NET: -1000 mL          PHYSICAL EXAM:    GENERAL: NAD, lying in bed comfortably  Abdomen: Soft, non distended. No pain on palpation. Prevena on  HEENT- NGT in      MEDICATIONS  (STANDING):  dextrose 5% + sodium chloride 0.45% with potassium chloride 20 mEq/L 1000 milliLiter(s) (100 mL/Hr) IV Continuous <Continuous>  diphenhydrAMINE Injectable 25 milliGRAM(s) IV Push once  enoxaparin Injectable 40 milliGRAM(s) SubCutaneous every 24 hours  escitalopram Solution 15 milliGRAM(s) Oral daily  HYDROmorphone PCA (1 mG/mL) 30 milliLiter(s) PCA Continuous PCA Continuous  ketotifen 0.025% Ophthalmic Solution - Peds      ketotifen 0.025% Ophthalmic Solution - Peds 1 Drop(s) Both EYES daily  levothyroxine Injectable 100 MICROGram(s) IV Push <User Schedule>  pantoprazole  Injectable 40 milliGRAM(s) IV Push daily    MEDICATIONS  (PRN):  ALPRAZolam 0.5 milliGRAM(s) Oral once PRN anxiety  ketorolac   Injectable 15 milliGRAM(s) IV Push every 4 hours PRN Mild Pain (1 - 3)  naloxone Injectable 0.1 milliGRAM(s) IV Push every 3 minutes PRN For ANY of the following changes in patient status:  A. RR LESS THAN 10 breaths per minute, B. Oxygen saturation LESS THAN 90%, C. Sedation score of 6  ondansetron Injectable 4 milliGRAM(s) IV Push every 6 hours PRN Nausea      LABS:                          13.2   10.94 )-----------( 237      ( 28 May 2022 06:42 )             40.3     05-28    136  |  101  |  11  ----------------------------<  103<H>  3.9   |  25  |  0.60    Ca    8.3<L>      28 May 2022 06:42  Phos  3.1     05-28  Mg     2.20     05-28    TPro  6.5  /  Alb  3.7  /  TBili  1.7<H>  /  DBili  x   /  AST  46<H>  /  ALT  37<H>  /  AlkPhos  105  05-27

## 2022-05-29 NOTE — PROGRESS NOTE ADULT - ASSESSMENT
ASSESSMENT/PLAN: BOUCHRA PALACIOS 46y Female s/p  distal panc, splenectomy, left adrenalectomy      - Diet: NPO  - NGT to LCWS  - Pain control  - Hydrocortisone, appreciate Endo recs  - Input and outputs   - C/w prevena  - DVT ppx  - OOB/incentive spirometry     D Team Surgery   #[id number]    Seen at bedside with Dr. Daigle.

## 2022-05-30 LAB
ALBUMIN SERPL ELPH-MCNC: 3.3 G/DL — SIGNIFICANT CHANGE UP (ref 3.3–5)
ALP SERPL-CCNC: 89 U/L — SIGNIFICANT CHANGE UP (ref 40–120)
ALT FLD-CCNC: 36 U/L — HIGH (ref 4–33)
ANION GAP SERPL CALC-SCNC: 7 MMOL/L — SIGNIFICANT CHANGE UP (ref 7–14)
AST SERPL-CCNC: 57 U/L — HIGH (ref 4–32)
BILIRUB SERPL-MCNC: 0.9 MG/DL — SIGNIFICANT CHANGE UP (ref 0.2–1.2)
BUN SERPL-MCNC: 5 MG/DL — LOW (ref 7–23)
CALCIUM SERPL-MCNC: 9 MG/DL — SIGNIFICANT CHANGE UP (ref 8.4–10.5)
CHLORIDE SERPL-SCNC: 103 MMOL/L — SIGNIFICANT CHANGE UP (ref 98–107)
CO2 SERPL-SCNC: 28 MMOL/L — SIGNIFICANT CHANGE UP (ref 22–31)
CREAT SERPL-MCNC: 0.6 MG/DL — SIGNIFICANT CHANGE UP (ref 0.5–1.3)
EGFR: 113 ML/MIN/1.73M2 — SIGNIFICANT CHANGE UP
GLUCOSE SERPL-MCNC: 107 MG/DL — HIGH (ref 70–99)
HCT VFR BLD CALC: 40 % — SIGNIFICANT CHANGE UP (ref 34.5–45)
HGB BLD-MCNC: 12.6 G/DL — SIGNIFICANT CHANGE UP (ref 11.5–15.5)
MAGNESIUM SERPL-MCNC: 2 MG/DL — SIGNIFICANT CHANGE UP (ref 1.6–2.6)
MCHC RBC-ENTMCNC: 30.9 PG — SIGNIFICANT CHANGE UP (ref 27–34)
MCHC RBC-ENTMCNC: 31.5 GM/DL — LOW (ref 32–36)
MCV RBC AUTO: 98 FL — SIGNIFICANT CHANGE UP (ref 80–100)
NRBC # BLD: 0 /100 WBCS — SIGNIFICANT CHANGE UP
NRBC # FLD: 0 K/UL — SIGNIFICANT CHANGE UP
PHOSPHATE SERPL-MCNC: 3.7 MG/DL — SIGNIFICANT CHANGE UP (ref 2.5–4.5)
PLATELET # BLD AUTO: 288 K/UL — SIGNIFICANT CHANGE UP (ref 150–400)
POTASSIUM SERPL-MCNC: 4.7 MMOL/L — SIGNIFICANT CHANGE UP (ref 3.5–5.3)
POTASSIUM SERPL-SCNC: 4.7 MMOL/L — SIGNIFICANT CHANGE UP (ref 3.5–5.3)
PROT SERPL-MCNC: 6.3 G/DL — SIGNIFICANT CHANGE UP (ref 6–8.3)
RBC # BLD: 4.08 M/UL — SIGNIFICANT CHANGE UP (ref 3.8–5.2)
RBC # FLD: 14.5 % — SIGNIFICANT CHANGE UP (ref 10.3–14.5)
SODIUM SERPL-SCNC: 138 MMOL/L — SIGNIFICANT CHANGE UP (ref 135–145)
WBC # BLD: 9.15 K/UL — SIGNIFICANT CHANGE UP (ref 3.8–10.5)
WBC # FLD AUTO: 9.15 K/UL — SIGNIFICANT CHANGE UP (ref 3.8–10.5)

## 2022-05-30 RX ORDER — DIPHENHYDRAMINE HCL 50 MG
25 CAPSULE ORAL EVERY 4 HOURS
Refills: 0 | Status: DISCONTINUED | OUTPATIENT
Start: 2022-05-30 | End: 2022-05-30

## 2022-05-30 RX ORDER — HYDROCORTISONE 1 %
1 OINTMENT (GRAM) TOPICAL
Refills: 0 | Status: DISCONTINUED | OUTPATIENT
Start: 2022-05-30 | End: 2022-06-01

## 2022-05-30 RX ORDER — HYDROMORPHONE HYDROCHLORIDE 2 MG/ML
30 INJECTION INTRAMUSCULAR; INTRAVENOUS; SUBCUTANEOUS
Refills: 0 | Status: ACTIVE | OUTPATIENT
Start: 2022-05-30 | End: 2022-06-06

## 2022-05-30 RX ORDER — DIPHENHYDRAMINE HCL 50 MG
25 CAPSULE ORAL EVERY 8 HOURS
Refills: 0 | Status: ACTIVE | OUTPATIENT
Start: 2022-05-30 | End: 2023-04-28

## 2022-05-30 RX ORDER — HYDROMORPHONE HYDROCHLORIDE 2 MG/ML
0.5 INJECTION INTRAMUSCULAR; INTRAVENOUS; SUBCUTANEOUS
Refills: 0 | Status: ACTIVE | OUTPATIENT
Start: 2022-05-30 | End: 2022-06-06

## 2022-05-30 RX ADMIN — HYDROMORPHONE HYDROCHLORIDE 30 MILLILITER(S): 2 INJECTION INTRAMUSCULAR; INTRAVENOUS; SUBCUTANEOUS at 08:50

## 2022-05-30 RX ADMIN — DEXTROSE MONOHYDRATE, SODIUM CHLORIDE, AND POTASSIUM CHLORIDE 100 MILLILITER(S): 50; .745; 4.5 INJECTION, SOLUTION INTRAVENOUS at 17:40

## 2022-05-30 RX ADMIN — Medication 400 MILLIGRAM(S): at 17:40

## 2022-05-30 RX ADMIN — Medication 25 MILLIGRAM(S): at 17:40

## 2022-05-30 RX ADMIN — ESCITALOPRAM OXALATE 15 MILLIGRAM(S): 10 TABLET, FILM COATED ORAL at 11:10

## 2022-05-30 RX ADMIN — Medication 400 MILLIGRAM(S): at 05:14

## 2022-05-30 RX ADMIN — HYDROMORPHONE HYDROCHLORIDE 30 MILLILITER(S): 2 INJECTION INTRAMUSCULAR; INTRAVENOUS; SUBCUTANEOUS at 19:22

## 2022-05-30 RX ADMIN — ENOXAPARIN SODIUM 40 MILLIGRAM(S): 100 INJECTION SUBCUTANEOUS at 17:40

## 2022-05-30 RX ADMIN — KETOTIFEN FUMARATE 1 DROP(S): 0.34 SOLUTION OPHTHALMIC at 11:24

## 2022-05-30 RX ADMIN — Medication 100 MICROGRAM(S): at 05:18

## 2022-05-30 RX ADMIN — Medication 1000 MILLIGRAM(S): at 11:24

## 2022-05-30 RX ADMIN — PANTOPRAZOLE SODIUM 40 MILLIGRAM(S): 20 TABLET, DELAYED RELEASE ORAL at 11:10

## 2022-05-30 RX ADMIN — DEXTROSE MONOHYDRATE, SODIUM CHLORIDE, AND POTASSIUM CHLORIDE 100 MILLILITER(S): 50; .745; 4.5 INJECTION, SOLUTION INTRAVENOUS at 20:56

## 2022-05-30 RX ADMIN — HYDROMORPHONE HYDROCHLORIDE 30 MILLILITER(S): 2 INJECTION INTRAMUSCULAR; INTRAVENOUS; SUBCUTANEOUS at 07:19

## 2022-05-30 RX ADMIN — Medication 400 MILLIGRAM(S): at 11:09

## 2022-05-30 RX ADMIN — HYDROMORPHONE HYDROCHLORIDE 0.5 MILLIGRAM(S): 2 INJECTION INTRAMUSCULAR; INTRAVENOUS; SUBCUTANEOUS at 08:49

## 2022-05-30 RX ADMIN — Medication 25 MILLIGRAM(S): at 23:33

## 2022-05-30 RX ADMIN — Medication 400 MILLIGRAM(S): at 23:34

## 2022-05-30 RX ADMIN — HYDROMORPHONE HYDROCHLORIDE 30 MILLILITER(S): 2 INJECTION INTRAMUSCULAR; INTRAVENOUS; SUBCUTANEOUS at 07:16

## 2022-05-30 RX ADMIN — Medication 1000 MILLIGRAM(S): at 23:34

## 2022-05-30 RX ADMIN — Medication 1 APPLICATION(S): at 23:35

## 2022-05-30 NOTE — PROGRESS NOTE ADULT - SUBJECTIVE AND OBJECTIVE BOX
Anesthesia Pain Management Service    SUBJECTIVE: Patient is doing well with IV PCA and no significant problems reported.  Patient denies taking pain medications at home, but she does take Lexapro for anxiety and depression.   Pain Scale Score	At rest: _7/10__ 	With Activity: ___ 	[X ] Refer to charted pain scores    THERAPY:    [ ] IV PCA Morphine		[ ] 5 mg/mL	[ ] 1 mg/mL  [X ] IV PCA Hydromorphone	[ ] 5 mg/mL	[X ] 1 mg/mL  [ ] IV PCA Fentanyl		[ ] 50 micrograms/mL    Demand dose __0.2_ lockout __6_ (minutes) Continuous Rate _0__ Total:   10___  mg used (in past 24 hours)      MEDICATIONS  (STANDING):  acetaminophen   IVPB .. 1000 milliGRAM(s) IV Intermittent every 6 hours  dextrose 5% + sodium chloride 0.45% with potassium chloride 20 mEq/L 1000 milliLiter(s) (100 mL/Hr) IV Continuous <Continuous>  diphenhydrAMINE Injectable 25 milliGRAM(s) IV Push once  enoxaparin Injectable 40 milliGRAM(s) SubCutaneous every 24 hours  escitalopram Solution 15 milliGRAM(s) Oral daily  HYDROmorphone PCA (1 mG/mL) 30 milliLiter(s) PCA Continuous PCA Continuous  ketotifen 0.025% Ophthalmic Solution - Peds      ketotifen 0.025% Ophthalmic Solution - Peds 1 Drop(s) Both EYES daily  levothyroxine Injectable 100 MICROGram(s) IV Push <User Schedule>  pantoprazole  Injectable 40 milliGRAM(s) IV Push daily    MEDICATIONS  (PRN):  ALPRAZolam 0.5 milliGRAM(s) Oral once PRN anxiety  HYDROmorphone PCA (1 mG/mL) Rescue Clinician Bolus 0.5 milliGRAM(s) IV Push every 15 minutes PRN for Pain Scale GREATER THAN 6  naloxone Injectable 0.1 milliGRAM(s) IV Push every 3 minutes PRN For ANY of the following changes in patient status:  A. RR LESS THAN 10 breaths per minute, B. Oxygen saturation LESS THAN 90%, C. Sedation score of 6  ondansetron Injectable 4 milliGRAM(s) IV Push every 6 hours PRN Nausea      OBJECTIVE:  Patient is sitting up, NPO with NGT.    Sedation Score:	[ X] Alert	 [ ] Drowsy 	[ ] Arousable	[ ] Asleep	[ ] Unresponsive    Side Effects:	[X ] None	[ ] Nausea	[ ] Vomiting	[ ] Pruritus  		[ ] Other:    Vital Signs Last 24 Hrs  T(C): 36.6 (30 May 2022 05:08), Max: 36.9 (29 May 2022 17:42)  T(F): 97.9 (30 May 2022 05:08), Max: 98.4 (29 May 2022 17:42)  HR: 63 (30 May 2022 09:01) (63 - 83)  BP: 113/66 (30 May 2022 09:01) (98/58 - 115/75)  BP(mean): --  RR: 18 (30 May 2022 05:08) (17 - 18)  SpO2: 99% (30 May 2022 05:08) (94% - 99%)    ASSESSMENT/ PLAN    Therapy to  be:	[ X] Continue   [ ] Discontinued   [ ] Change to prn Analgesics    Documentation and Verification of current medications:   [X] Done	[ ] Not done, not elligible    Comments: Patient still NPO, will continue with IV Dilaudid PCA.  Recommend non-opioid adjuvant analgesics to be used when possible and when allowed by primary surgical team.    Progress Note written now but Patient was seen earlier.

## 2022-05-30 NOTE — PROGRESS NOTE ADULT - SUBJECTIVE AND OBJECTIVE BOX
Anesthesia Pain Management Service    SUBJECTIVE: Patient is doing well with IV PCA and no significant problems reported.  Patient denies taking pain medications at home, but she does take Lexapro for anxiety and depression.   Pain Scale Score	At rest: _7/10__ 	With Activity: ___ 	[X ] Refer to charted pain scores    THERAPY:    [ ] IV PCA Morphine		[ ] 5 mg/mL	[ ] 1 mg/mL  [X ] IV PCA Hydromorphone	[ ] 5 mg/mL	[X ] 1 mg/mL  [ ] IV PCA Fentanyl		[ ] 50 micrograms/mL    Demand dose __0.2_ lockout __6_ (minutes) Continuous Rate _0__ Total:   10___  mg used (in past 24 hours)      MEDICATIONS  (STANDING):  acetaminophen   IVPB .. 1000 milliGRAM(s) IV Intermittent every 6 hours  dextrose 5% + sodium chloride 0.45% with potassium chloride 20 mEq/L 1000 milliLiter(s) (100 mL/Hr) IV Continuous <Continuous>  diphenhydrAMINE Injectable 25 milliGRAM(s) IV Push once  enoxaparin Injectable 40 milliGRAM(s) SubCutaneous every 24 hours  escitalopram Solution 15 milliGRAM(s) Oral daily  HYDROmorphone PCA (1 mG/mL) 30 milliLiter(s) PCA Continuous PCA Continuous  ketotifen 0.025% Ophthalmic Solution - Peds      ketotifen 0.025% Ophthalmic Solution - Peds 1 Drop(s) Both EYES daily  levothyroxine Injectable 100 MICROGram(s) IV Push <User Schedule>  pantoprazole  Injectable 40 milliGRAM(s) IV Push daily    MEDICATIONS  (PRN):  ALPRAZolam 0.5 milliGRAM(s) Oral once PRN anxiety  HYDROmorphone PCA (1 mG/mL) Rescue Clinician Bolus 0.5 milliGRAM(s) IV Push every 15 minutes PRN for Pain Scale GREATER THAN 6  naloxone Injectable 0.1 milliGRAM(s) IV Push every 3 minutes PRN For ANY of the following changes in patient status:  A. RR LESS THAN 10 breaths per minute, B. Oxygen saturation LESS THAN 90%, C. Sedation score of 6  ondansetron Injectable 4 milliGRAM(s) IV Push every 6 hours PRN Nausea      OBJECTIVE:  Patient is sitting up, NPO with NGT.    Sedation Score:	[ X] Alert	 [ ] Drowsy 	[ ] Arousable	[ ] Asleep	[ ] Unresponsive    Side Effects:	[X ] None	[ ] Nausea	[ ] Vomiting	[ ] Pruritus  		[ ] Other:    Vital Signs Last 24 Hrs  T(C): 36.6 (30 May 2022 05:08), Max: 36.9 (29 May 2022 17:42)  T(F): 97.9 (30 May 2022 05:08), Max: 98.4 (29 May 2022 17:42)  HR: 63 (30 May 2022 09:01) (63 - 83)  BP: 113/66 (30 May 2022 09:01) (98/58 - 115/75)  BP(mean): --  RR: 18 (30 May 2022 05:08) (17 - 18)  SpO2: 99% (30 May 2022 05:08) (94% - 99%)    ASSESSMENT/ PLAN    Therapy to  be:	[ X] Continue   [ ] Discontinued   [ ] Change to prn Analgesics    Documentation and Verification of current medications:   [X] Done	[ ] Not done, not eligible    Comments: Patient still NPO, will continue with IV Dilaudid PCA.  Recommend non-opioid adjuvant analgesics to be used when possible and when allowed by primary surgical team.    Progress Note written now but Patient was seen earlier.

## 2022-05-30 NOTE — PROGRESS NOTE ADULT - ASSESSMENT
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 Daigle    D Team Surgery   #[id number]

## 2022-05-30 NOTE — PROGRESS NOTE ADULT - SUBJECTIVE AND OBJECTIVE BOX
Subjective:   Patient seen at bedside this AM. Reports feeling well, without complaints. Denies chest pain, SOB. Tolerating diet without N/V.     24h Events:   - Overnight, no acute events    Objective:  Vital Signs  T(C): 36.6 (05-30 @ 00:27), Max: 36.9 (05-29 @ 17:42)  HR: 64 (05-30 @ 00:27) (64 - 83)  BP: 98/58 (05-30 @ 00:27) (98/58 - 105/64)  RR: 17 (05-30 @ 00:27) (17 - 18)  SpO2: 94% (05-30 @ 00:27) (94% - 97%)  05-28-22 @ 07:01  -  05-29-22 @ 07:00  --------------------------------------------------------  IN:  Total IN: 0 mL    OUT:    Indwelling Catheter - Urethral (mL): 150 mL    Nasogastric/Oral tube (mL): 1300 mL    Voided (mL): 750 mL  Total OUT: 2200 mL    Total NET: -2200 mL      05-29-22 @ 07:01  -  05-30-22 @ 01:19  --------------------------------------------------------  IN:  Total IN: 0 mL    OUT:    Nasogastric/Oral tube (mL): 950 mL    Voided (mL): 1650 mL  Total OUT: 2600 mL    Total NET: -2600 mL          PHYSICAL EXAM:    GENERAL: NAD, lying in bed comfortably  Abdomen: Soft, non distended. No pain on palpation. Prevena on  HEENT- NGT in    Labs:                        12.5   10.32 )-----------( 258      ( 29 May 2022 06:52 )             38.8   05-29    137  |  101  |  8   ----------------------------<  120<H>  3.5   |  26  |  0.58    Ca    8.4      29 May 2022 06:52  Phos  2.2     05-29  Mg     2.00     05-29    TPro  6.2  /  Alb  3.2<L>  /  TBili  0.9  /  DBili  x   /  AST  68<H>  /  ALT  34<H>  /  AlkPhos  87  05-29    CAPILLARY BLOOD GLUCOSE          Medications:   MEDICATIONS  (STANDING):  acetaminophen   IVPB .. 1000 milliGRAM(s) IV Intermittent every 6 hours  acetaminophen   IVPB .. 1000 milliGRAM(s) IV Intermittent every 6 hours  dextrose 5% + sodium chloride 0.45% with potassium chloride 20 mEq/L 1000 milliLiter(s) (100 mL/Hr) IV Continuous <Continuous>  diphenhydrAMINE Injectable 25 milliGRAM(s) IV Push once  enoxaparin Injectable 40 milliGRAM(s) SubCutaneous every 24 hours  escitalopram Solution 15 milliGRAM(s) Oral daily  HYDROmorphone PCA (1 mG/mL) 30 milliLiter(s) PCA Continuous PCA Continuous  ketotifen 0.025% Ophthalmic Solution - Peds      ketotifen 0.025% Ophthalmic Solution - Peds 1 Drop(s) Both EYES daily  levothyroxine Injectable 100 MICROGram(s) IV Push <User Schedule>  pantoprazole  Injectable 40 milliGRAM(s) IV Push daily    MEDICATIONS  (PRN):  ALPRAZolam 0.5 milliGRAM(s) Oral once PRN anxiety  naloxone Injectable 0.1 milliGRAM(s) IV Push every 3 minutes PRN For ANY of the following changes in patient status:  A. RR LESS THAN 10 breaths per minute, B. Oxygen saturation LESS THAN 90%, C. Sedation score of 6  ondansetron Injectable 4 milliGRAM(s) IV Push every 6 hours PRN Nausea      Imaging:     Subjective:   Patient seen at bedside this AM. Reports feeling well, without complaints. Denies chest pain, SOB. Passing gas, no BM. NGT clamped at rounds.   24h Events:   - Overnight, no acute events    Objective:  Vital Signs  T(C): 36.6 (05-30 @ 00:27), Max: 36.9 (05-29 @ 17:42)  HR: 64 (05-30 @ 00:27) (64 - 83)  BP: 98/58 (05-30 @ 00:27) (98/58 - 105/64)  RR: 17 (05-30 @ 00:27) (17 - 18)  SpO2: 94% (05-30 @ 00:27) (94% - 97%)  05-28-22 @ 07:01  -  05-29-22 @ 07:00  --------------------------------------------------------  IN:  Total IN: 0 mL    OUT:    Indwelling Catheter - Urethral (mL): 150 mL    Nasogastric/Oral tube (mL): 1300 mL    Voided (mL): 750 mL  Total OUT: 2200 mL    Total NET: -2200 mL      05-29-22 @ 07:01  -  05-30-22 @ 01:19  --------------------------------------------------------  IN:  Total IN: 0 mL    OUT:    Nasogastric/Oral tube (mL): 950 mL    Voided (mL): 1650 mL  Total OUT: 2600 mL    Total NET: -2600 mL          PHYSICAL EXAM:    GENERAL: NAD, lying in bed comfortably  Abdomen: Soft, non distended. No pain on palpation.  HEENT- NGT in    Labs:                        12.5   10.32 )-----------( 258      ( 29 May 2022 06:52 )             38.8   05-29    137  |  101  |  8   ----------------------------<  120<H>  3.5   |  26  |  0.58    Ca    8.4      29 May 2022 06:52  Phos  2.2     05-29  Mg     2.00     05-29    TPro  6.2  /  Alb  3.2<L>  /  TBili  0.9  /  DBili  x   /  AST  68<H>  /  ALT  34<H>  /  AlkPhos  87  05-29    CAPILLARY BLOOD GLUCOSE          Medications:   MEDICATIONS  (STANDING):  acetaminophen   IVPB .. 1000 milliGRAM(s) IV Intermittent every 6 hours  acetaminophen   IVPB .. 1000 milliGRAM(s) IV Intermittent every 6 hours  dextrose 5% + sodium chloride 0.45% with potassium chloride 20 mEq/L 1000 milliLiter(s) (100 mL/Hr) IV Continuous <Continuous>  diphenhydrAMINE Injectable 25 milliGRAM(s) IV Push once  enoxaparin Injectable 40 milliGRAM(s) SubCutaneous every 24 hours  escitalopram Solution 15 milliGRAM(s) Oral daily  HYDROmorphone PCA (1 mG/mL) 30 milliLiter(s) PCA Continuous PCA Continuous  ketotifen 0.025% Ophthalmic Solution - Peds      ketotifen 0.025% Ophthalmic Solution - Peds 1 Drop(s) Both EYES daily  levothyroxine Injectable 100 MICROGram(s) IV Push <User Schedule>  pantoprazole  Injectable 40 milliGRAM(s) IV Push daily    MEDICATIONS  (PRN):  ALPRAZolam 0.5 milliGRAM(s) Oral once PRN anxiety  naloxone Injectable 0.1 milliGRAM(s) IV Push every 3 minutes PRN For ANY of the following changes in patient status:  A. RR LESS THAN 10 breaths per minute, B. Oxygen saturation LESS THAN 90%, C. Sedation score of 6  ondansetron Injectable 4 milliGRAM(s) IV Push every 6 hours PRN Nausea      Imaging:

## 2022-05-31 LAB
ANION GAP SERPL CALC-SCNC: 12 MMOL/L — SIGNIFICANT CHANGE UP (ref 7–14)
BUN SERPL-MCNC: 5 MG/DL — LOW (ref 7–23)
CALCIUM SERPL-MCNC: 9.3 MG/DL — SIGNIFICANT CHANGE UP (ref 8.4–10.5)
CHLORIDE SERPL-SCNC: 103 MMOL/L — SIGNIFICANT CHANGE UP (ref 98–107)
CO2 SERPL-SCNC: 23 MMOL/L — SIGNIFICANT CHANGE UP (ref 22–31)
CREAT SERPL-MCNC: 0.61 MG/DL — SIGNIFICANT CHANGE UP (ref 0.5–1.3)
EGFR: 113 ML/MIN/1.73M2 — SIGNIFICANT CHANGE UP
GLUCOSE SERPL-MCNC: 94 MG/DL — SIGNIFICANT CHANGE UP (ref 70–99)
HCT VFR BLD CALC: 37.6 % — SIGNIFICANT CHANGE UP (ref 34.5–45)
HGB BLD-MCNC: 12 G/DL — SIGNIFICANT CHANGE UP (ref 11.5–15.5)
MAGNESIUM SERPL-MCNC: 2 MG/DL — SIGNIFICANT CHANGE UP (ref 1.6–2.6)
MCHC RBC-ENTMCNC: 30.8 PG — SIGNIFICANT CHANGE UP (ref 27–34)
MCHC RBC-ENTMCNC: 31.9 GM/DL — LOW (ref 32–36)
MCV RBC AUTO: 96.4 FL — SIGNIFICANT CHANGE UP (ref 80–100)
NRBC # BLD: 0 /100 WBCS — SIGNIFICANT CHANGE UP
NRBC # FLD: 0 K/UL — SIGNIFICANT CHANGE UP
PHOSPHATE SERPL-MCNC: 4.4 MG/DL — SIGNIFICANT CHANGE UP (ref 2.5–4.5)
PLATELET # BLD AUTO: 332 K/UL — SIGNIFICANT CHANGE UP (ref 150–400)
POTASSIUM SERPL-MCNC: 4.5 MMOL/L — SIGNIFICANT CHANGE UP (ref 3.5–5.3)
POTASSIUM SERPL-SCNC: 4.5 MMOL/L — SIGNIFICANT CHANGE UP (ref 3.5–5.3)
RBC # BLD: 3.9 M/UL — SIGNIFICANT CHANGE UP (ref 3.8–5.2)
RBC # FLD: 14.4 % — SIGNIFICANT CHANGE UP (ref 10.3–14.5)
SODIUM SERPL-SCNC: 138 MMOL/L — SIGNIFICANT CHANGE UP (ref 135–145)
WBC # BLD: 7.43 K/UL — SIGNIFICANT CHANGE UP (ref 3.8–10.5)
WBC # FLD AUTO: 7.43 K/UL — SIGNIFICANT CHANGE UP (ref 3.8–10.5)

## 2022-05-31 PROCEDURE — 71045 X-RAY EXAM CHEST 1 VIEW: CPT | Mod: 26,59

## 2022-05-31 RX ORDER — KETOROLAC TROMETHAMINE 30 MG/ML
15 SYRINGE (ML) INJECTION EVERY 6 HOURS
Refills: 0 | Status: COMPLETED | OUTPATIENT
Start: 2022-05-31 | End: 2022-06-03

## 2022-05-31 RX ORDER — ACETAMINOPHEN 500 MG
1000 TABLET ORAL EVERY 6 HOURS
Refills: 0 | Status: ACTIVE | OUTPATIENT
Start: 2022-05-31 | End: 2022-06-01

## 2022-05-31 RX ORDER — METOCLOPRAMIDE HCL 10 MG
10 TABLET ORAL EVERY 8 HOURS
Refills: 0 | Status: ACTIVE | OUTPATIENT
Start: 2022-05-31 | End: 2023-04-29

## 2022-05-31 RX ADMIN — Medication 15 MILLIGRAM(S): at 11:30

## 2022-05-31 RX ADMIN — PANTOPRAZOLE SODIUM 40 MILLIGRAM(S): 20 TABLET, DELAYED RELEASE ORAL at 11:27

## 2022-05-31 RX ADMIN — Medication 15 MILLIGRAM(S): at 12:00

## 2022-05-31 RX ADMIN — HYDROMORPHONE HYDROCHLORIDE 30 MILLILITER(S): 2 INJECTION INTRAMUSCULAR; INTRAVENOUS; SUBCUTANEOUS at 07:38

## 2022-05-31 RX ADMIN — Medication 1000 MILLIGRAM(S): at 16:11

## 2022-05-31 RX ADMIN — ENOXAPARIN SODIUM 40 MILLIGRAM(S): 100 INJECTION SUBCUTANEOUS at 18:15

## 2022-05-31 RX ADMIN — HYDROMORPHONE HYDROCHLORIDE 30 MILLILITER(S): 2 INJECTION INTRAMUSCULAR; INTRAVENOUS; SUBCUTANEOUS at 06:00

## 2022-05-31 RX ADMIN — Medication 25 MILLIGRAM(S): at 08:31

## 2022-05-31 RX ADMIN — Medication 15 MILLIGRAM(S): at 18:14

## 2022-05-31 RX ADMIN — Medication 400 MILLIGRAM(S): at 05:41

## 2022-05-31 RX ADMIN — Medication 400 MILLIGRAM(S): at 23:49

## 2022-05-31 RX ADMIN — HYDROMORPHONE HYDROCHLORIDE 30 MILLILITER(S): 2 INJECTION INTRAMUSCULAR; INTRAVENOUS; SUBCUTANEOUS at 19:59

## 2022-05-31 RX ADMIN — Medication 10 MILLIGRAM(S): at 15:37

## 2022-05-31 RX ADMIN — ESCITALOPRAM OXALATE 15 MILLIGRAM(S): 10 TABLET, FILM COATED ORAL at 11:27

## 2022-05-31 RX ADMIN — Medication 400 MILLIGRAM(S): at 15:41

## 2022-05-31 RX ADMIN — Medication 100 MICROGRAM(S): at 05:21

## 2022-05-31 RX ADMIN — Medication 1 APPLICATION(S): at 18:13

## 2022-05-31 RX ADMIN — Medication 15 MILLIGRAM(S): at 18:44

## 2022-05-31 RX ADMIN — Medication 1 APPLICATION(S): at 05:21

## 2022-05-31 RX ADMIN — Medication 10 MILLIGRAM(S): at 21:30

## 2022-05-31 RX ADMIN — KETOTIFEN FUMARATE 1 DROP(S): 0.34 SOLUTION OPHTHALMIC at 11:32

## 2022-05-31 RX ADMIN — DEXTROSE MONOHYDRATE, SODIUM CHLORIDE, AND POTASSIUM CHLORIDE 100 MILLILITER(S): 50; .745; 4.5 INJECTION, SOLUTION INTRAVENOUS at 12:37

## 2022-05-31 NOTE — PROGRESS NOTE ADULT - SUBJECTIVE AND OBJECTIVE BOX
Subjective:   Patient seen at bedside this AM. Reports feeling well, without complaints. Denies chest pain, SOB. Tolerating diet without N/V.     24h Events:   - Overnight, no acute events    Objective:  Vital Signs  T(C): 36.6 (05-30 @ 20:51), Max: 37.1 (05-30 @ 12:42)  HR: 60 (05-30 @ 20:51) (58 - 74)  BP: 101/63 (05-30 @ 20:51) (98/58 - 122/73)  RR: 17 (05-30 @ 20:51) (16 - 18)  SpO2: 95% (05-30 @ 20:51) (91% - 99%)  05-29-22 @ 07:01  -  05-30-22 @ 07:00  --------------------------------------------------------  IN:  Total IN: 0 mL    OUT:    Nasogastric/Oral tube (mL): 1000 mL    Voided (mL): 2250 mL  Total OUT: 3250 mL    Total NET: -3250 mL      05-30-22 @ 07:01  -  05-31-22 @ 00:24  --------------------------------------------------------  IN:  Total IN: 0 mL    OUT:    Nasogastric/Oral tube (mL): 900 mL    Voided (mL): 1900 mL  Total OUT: 2800 mL    Total NET: -2800 mL        PHYSICAL EXAM:    GENERAL: NAD, lying in bed comfortably  Abdomen: Soft, non distended. No pain on palpation.  HEENT- NGT in    Labs:                        12.6   9.15  )-----------( 288      ( 30 May 2022 05:25 )             40.0   05-30    138  |  103  |  5<L>  ----------------------------<  107<H>  4.7   |  28  |  0.60    Ca    9.0      30 May 2022 05:25  Phos  3.7     05-30  Mg     2.00     05-30    TPro  6.3  /  Alb  3.3  /  TBili  0.9  /  DBili  x   /  AST  57<H>  /  ALT  36<H>  /  AlkPhos  89  05-30    CAPILLARY BLOOD GLUCOSE          Medications:   MEDICATIONS  (STANDING):  acetaminophen   IVPB .. 1000 milliGRAM(s) IV Intermittent every 6 hours  dextrose 5% + sodium chloride 0.45% with potassium chloride 20 mEq/L 1000 milliLiter(s) (100 mL/Hr) IV Continuous <Continuous>  enoxaparin Injectable 40 milliGRAM(s) SubCutaneous every 24 hours  escitalopram Solution 15 milliGRAM(s) Oral daily  hydrocortisone 1% Cream 1 Application(s) Topical two times a day  HYDROmorphone PCA (1 mG/mL) 30 milliLiter(s) PCA Continuous PCA Continuous  ketotifen 0.025% Ophthalmic Solution - Peds      ketotifen 0.025% Ophthalmic Solution - Peds 1 Drop(s) Both EYES daily  levothyroxine Injectable 100 MICROGram(s) IV Push <User Schedule>  pantoprazole  Injectable 40 milliGRAM(s) IV Push daily    MEDICATIONS  (PRN):  ALPRAZolam 0.5 milliGRAM(s) Oral once PRN anxiety  diphenhydrAMINE Injectable 25 milliGRAM(s) IV Push every 8 hours PRN Rash and/or Itching  HYDROmorphone PCA (1 mG/mL) Rescue Clinician Bolus 0.5 milliGRAM(s) IV Push every 15 minutes PRN for Pain Scale GREATER THAN 6  naloxone Injectable 0.1 milliGRAM(s) IV Push every 3 minutes PRN For ANY of the following changes in patient status:  A. RR LESS THAN 10 breaths per minute, B. Oxygen saturation LESS THAN 90%, C. Sedation score of 6  ondansetron Injectable 4 milliGRAM(s) IV Push every 6 hours PRN Nausea      Imaging:     Subjective:   Patient seen at bedside this AM. Reports feeling well, without complaints. Denies chest pain, SOB. High NGT output, failed clamp trial yesterday. No nausea/vomiting.    24h Events:   - Overnight, no acute events    Objective:  Vital Signs  T(C): 36.6 (05-30 @ 20:51), Max: 37.1 (05-30 @ 12:42)  HR: 60 (05-30 @ 20:51) (58 - 74)  BP: 101/63 (05-30 @ 20:51) (98/58 - 122/73)  RR: 17 (05-30 @ 20:51) (16 - 18)  SpO2: 95% (05-30 @ 20:51) (91% - 99%)  05-29-22 @ 07:01  -  05-30-22 @ 07:00  --------------------------------------------------------  IN:  Total IN: 0 mL    OUT:    Nasogastric/Oral tube (mL): 1000 mL    Voided (mL): 2250 mL  Total OUT: 3250 mL    Total NET: -3250 mL      05-30-22 @ 07:01  -  05-31-22 @ 00:24  --------------------------------------------------------  IN:  Total IN: 0 mL    OUT:    Nasogastric/Oral tube (mL): 900 mL    Voided (mL): 1900 mL  Total OUT: 2800 mL    Total NET: -2800 mL        PHYSICAL EXAM:    GENERAL: NAD, lying in bed comfortably  Abdomen: Soft, non distended. No pain on palpation. Provena vac  HEENT: NGT    Labs:                        12.6   9.15  )-----------( 288      ( 30 May 2022 05:25 )             40.0   05-30    138  |  103  |  5<L>  ----------------------------<  107<H>  4.7   |  28  |  0.60    Ca    9.0      30 May 2022 05:25  Phos  3.7     05-30  Mg     2.00     05-30    TPro  6.3  /  Alb  3.3  /  TBili  0.9  /  DBili  x   /  AST  57<H>  /  ALT  36<H>  /  AlkPhos  89  05-30    CAPILLARY BLOOD GLUCOSE          Medications:   MEDICATIONS  (STANDING):  acetaminophen   IVPB .. 1000 milliGRAM(s) IV Intermittent every 6 hours  dextrose 5% + sodium chloride 0.45% with potassium chloride 20 mEq/L 1000 milliLiter(s) (100 mL/Hr) IV Continuous <Continuous>  enoxaparin Injectable 40 milliGRAM(s) SubCutaneous every 24 hours  escitalopram Solution 15 milliGRAM(s) Oral daily  hydrocortisone 1% Cream 1 Application(s) Topical two times a day  HYDROmorphone PCA (1 mG/mL) 30 milliLiter(s) PCA Continuous PCA Continuous  ketotifen 0.025% Ophthalmic Solution - Peds      ketotifen 0.025% Ophthalmic Solution - Peds 1 Drop(s) Both EYES daily  levothyroxine Injectable 100 MICROGram(s) IV Push <User Schedule>  pantoprazole  Injectable 40 milliGRAM(s) IV Push daily    MEDICATIONS  (PRN):  ALPRAZolam 0.5 milliGRAM(s) Oral once PRN anxiety  diphenhydrAMINE Injectable 25 milliGRAM(s) IV Push every 8 hours PRN Rash and/or Itching  HYDROmorphone PCA (1 mG/mL) Rescue Clinician Bolus 0.5 milliGRAM(s) IV Push every 15 minutes PRN for Pain Scale GREATER THAN 6  naloxone Injectable 0.1 milliGRAM(s) IV Push every 3 minutes PRN For ANY of the following changes in patient status:  A. RR LESS THAN 10 breaths per minute, B. Oxygen saturation LESS THAN 90%, C. Sedation score of 6  ondansetron Injectable 4 milliGRAM(s) IV Push every 6 hours PRN Nausea      Imaging:

## 2022-05-31 NOTE — PROGRESS NOTE ADULT - SUBJECTIVE AND OBJECTIVE BOX
Anesthesia Pain Management Service    SUBJECTIVE: Patient reports IV PCA helps relieve some pain and Tylenol helps as well. She states the NGT gives her more pain than the surgical site. She reports she uses the IV PCA as often as she can because she is worried about having to chasing the pain. As per patient's IStop, she was prescribed medical marijuana and Fentanyl patches which she states was for cancer pain. Last Fentanyl patch 12mcg/hr was dispensed 03/05/22.    Pain Scale Score	At rest: _7/10__ 	With Activity: ___ 	[X ] Refer to charted pain scores    THERAPY:    [ ] IV PCA Morphine		[ ] 5 mg/mL	[ ] 1 mg/mL  [X ] IV PCA Hydromorphone	[ ] 5 mg/mL	[X ] 1 mg/mL  [ ] IV PCA Fentanyl		[ ] 50 micrograms/mL    Demand dose __0.2_ lockout __6_ (minutes) Continuous Rate _0__ Total: ___  mg used (in past 24 hours)      MEDICATIONS  (STANDING):  acetaminophen   IVPB .. 1000 milliGRAM(s) IV Intermittent every 6 hours  acetaminophen   IVPB .. 1000 milliGRAM(s) IV Intermittent every 6 hours  dextrose 5% + sodium chloride 0.45% with potassium chloride 20 mEq/L 1000 milliLiter(s) (100 mL/Hr) IV Continuous <Continuous>  enoxaparin Injectable 40 milliGRAM(s) SubCutaneous every 24 hours  escitalopram Solution 15 milliGRAM(s) Oral daily  hydrocortisone 1% Cream 1 Application(s) Topical two times a day  HYDROmorphone PCA (1 mG/mL) 30 milliLiter(s) PCA Continuous PCA Continuous  ketorolac   Injectable 15 milliGRAM(s) IV Push every 6 hours  ketotifen 0.025% Ophthalmic Solution - Peds      ketotifen 0.025% Ophthalmic Solution - Peds 1 Drop(s) Both EYES daily  levothyroxine Injectable 100 MICROGram(s) IV Push <User Schedule>  metoclopramide Injectable 10 milliGRAM(s) IV Push every 8 hours  pantoprazole  Injectable 40 milliGRAM(s) IV Push daily    MEDICATIONS  (PRN):  ALPRAZolam 0.5 milliGRAM(s) Oral once PRN anxiety  diphenhydrAMINE Injectable 25 milliGRAM(s) IV Push every 8 hours PRN Rash and/or Itching  HYDROmorphone PCA (1 mG/mL) Rescue Clinician Bolus 0.5 milliGRAM(s) IV Push every 15 minutes PRN for Pain Scale GREATER THAN 6  naloxone Injectable 0.1 milliGRAM(s) IV Push every 3 minutes PRN For ANY of the following changes in patient status:  A. RR LESS THAN 10 breaths per minute, B. Oxygen saturation LESS THAN 90%, C. Sedation score of 6  ondansetron Injectable 4 milliGRAM(s) IV Push every 6 hours PRN Nausea      OBJECTIVE: Patient sitting up in bed with NGT.    Sedation Score:	[ X] Alert	[ ] Drowsy 	[ ] Arousable	[ ] Asleep	[ ] Unresponsive    Side Effects:	[X ] None	[ ] Nausea	[ ] Vomiting	[ ] Pruritus  		[ ] Other:    Vital Signs Last 24 Hrs  T(C): 36.6 (31 May 2022 11:25), Max: 37.1 (30 May 2022 12:42)  T(F): 97.8 (31 May 2022 11:25), Max: 98.8 (30 May 2022 12:42)  HR: 75 (31 May 2022 11:25) (57 - 80)  BP: 116/68 (31 May 2022 11:25) (97/59 - 122/73)  BP(mean): --  RR: 18 (31 May 2022 11:25) (16 - 18)  SpO2: 98% (31 May 2022 11:25) (91% - 98%)    ASSESSMENT/ PLAN    Therapy to  be:	[ X] Continue   [ ] Discontinued   [ ] Change to prn Analgesics    Documentation and Verification of current medications:   [X] Done	[ ] Not done, not elligible    Comments: Discussed patient with primary team, continue IV PCA. Recommend non-opioid adjuvant analgesics to be used when possible and when allowed by primary surgical team.    Progress Note written now but Patient was seen earlier.

## 2022-05-31 NOTE — PROGRESS NOTE ADULT - ASSESSMENT
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 Daigle    D Team Surgery   #[id number]   46F s/p  distal panc, splenectomy, left adrenalectomy 5/27.    PLAN  - Diet: NPO, NGT  - Pain control  - Hydrocortisone, appreciate Endo recs  - C/w prevena - take down 6/1  - DVT ppx  - OOB/incentive spirometry     D Team Surgery   #[id number]

## 2022-05-31 NOTE — PROGRESS NOTE ADULT - SUBJECTIVE AND OBJECTIVE BOX
Anesthesia Pain Management Service- Attending Addendum    SUBJECTIVE: Pt doing well with IV PCA without problems reported.    Therapy:	  [ X] IV PCA	   [ ] Epidural           [ ] s/p Spinal Opoid              [ ] Postpartum infusion	  [ ] Patient controlled regional anesthesia (PCRA)    [ ] prn Analgesics    Allergies    No Known Allergies    Intolerances      MEDICATIONS  (STANDING):  acetaminophen   IVPB .. 1000 milliGRAM(s) IV Intermittent every 6 hours  dextrose 5% + sodium chloride 0.45% with potassium chloride 20 mEq/L 1000 milliLiter(s) (100 mL/Hr) IV Continuous <Continuous>  enoxaparin Injectable 40 milliGRAM(s) SubCutaneous every 24 hours  escitalopram Solution 15 milliGRAM(s) Oral daily  hydrocortisone 1% Cream 1 Application(s) Topical two times a day  HYDROmorphone PCA (1 mG/mL) 30 milliLiter(s) PCA Continuous PCA Continuous  ketorolac   Injectable 15 milliGRAM(s) IV Push every 6 hours  ketotifen 0.025% Ophthalmic Solution - Peds      ketotifen 0.025% Ophthalmic Solution - Peds 1 Drop(s) Both EYES daily  levothyroxine Injectable 100 MICROGram(s) IV Push <User Schedule>  metoclopramide Injectable 10 milliGRAM(s) IV Push every 8 hours  pantoprazole  Injectable 40 milliGRAM(s) IV Push daily    MEDICATIONS  (PRN):  diphenhydrAMINE Injectable 25 milliGRAM(s) IV Push every 8 hours PRN Rash and/or Itching  HYDROmorphone PCA (1 mG/mL) Rescue Clinician Bolus 0.5 milliGRAM(s) IV Push every 15 minutes PRN for Pain Scale GREATER THAN 6  naloxone Injectable 0.1 milliGRAM(s) IV Push every 3 minutes PRN For ANY of the following changes in patient status:  A. RR LESS THAN 10 breaths per minute, B. Oxygen saturation LESS THAN 90%, C. Sedation score of 6  ondansetron Injectable 4 milliGRAM(s) IV Push every 6 hours PRN Nausea      OBJECTIVE:   [X] No new signs     [ ] Other:    Side Effects:  [X ] None			[ ] Other:    Assessment of Catheter Site:		[ ] Intact		[ ] Other:    ASSESSMENT/PLAN  [ X] Continue current therapy    [ ] Therapy changed to:    [ ] IV PCA       [ ] Epidural     [ ] prn Analgesics     Comments:    Note entered after patient seen

## 2022-06-01 LAB
ALBUMIN SERPL ELPH-MCNC: 3.4 G/DL — SIGNIFICANT CHANGE UP (ref 3.3–5)
ALP SERPL-CCNC: 130 U/L — HIGH (ref 40–120)
ALT FLD-CCNC: 31 U/L — SIGNIFICANT CHANGE UP (ref 4–33)
ANION GAP SERPL CALC-SCNC: 11 MMOL/L — SIGNIFICANT CHANGE UP (ref 7–14)
AST SERPL-CCNC: 41 U/L — HIGH (ref 4–32)
BILIRUB SERPL-MCNC: 0.7 MG/DL — SIGNIFICANT CHANGE UP (ref 0.2–1.2)
BUN SERPL-MCNC: 5 MG/DL — LOW (ref 7–23)
CALCIUM SERPL-MCNC: 9.2 MG/DL — SIGNIFICANT CHANGE UP (ref 8.4–10.5)
CHLORIDE SERPL-SCNC: 103 MMOL/L — SIGNIFICANT CHANGE UP (ref 98–107)
CO2 SERPL-SCNC: 25 MMOL/L — SIGNIFICANT CHANGE UP (ref 22–31)
CREAT SERPL-MCNC: 0.58 MG/DL — SIGNIFICANT CHANGE UP (ref 0.5–1.3)
EGFR: 114 ML/MIN/1.73M2 — SIGNIFICANT CHANGE UP
GLUCOSE SERPL-MCNC: 89 MG/DL — SIGNIFICANT CHANGE UP (ref 70–99)
HCT VFR BLD CALC: 42.1 % — SIGNIFICANT CHANGE UP (ref 34.5–45)
HGB BLD-MCNC: 13.8 G/DL — SIGNIFICANT CHANGE UP (ref 11.5–15.5)
MAGNESIUM SERPL-MCNC: 2 MG/DL — SIGNIFICANT CHANGE UP (ref 1.6–2.6)
MCHC RBC-ENTMCNC: 31.7 PG — SIGNIFICANT CHANGE UP (ref 27–34)
MCHC RBC-ENTMCNC: 32.8 GM/DL — SIGNIFICANT CHANGE UP (ref 32–36)
MCV RBC AUTO: 96.6 FL — SIGNIFICANT CHANGE UP (ref 80–100)
NRBC # BLD: 0 /100 WBCS — SIGNIFICANT CHANGE UP
NRBC # FLD: 0 K/UL — SIGNIFICANT CHANGE UP
PHOSPHATE SERPL-MCNC: 3.8 MG/DL — SIGNIFICANT CHANGE UP (ref 2.5–4.5)
PLATELET # BLD AUTO: 420 K/UL — HIGH (ref 150–400)
POTASSIUM SERPL-MCNC: 3.5 MMOL/L — SIGNIFICANT CHANGE UP (ref 3.5–5.3)
POTASSIUM SERPL-SCNC: 3.5 MMOL/L — SIGNIFICANT CHANGE UP (ref 3.5–5.3)
PROT SERPL-MCNC: 6.9 G/DL — SIGNIFICANT CHANGE UP (ref 6–8.3)
RBC # BLD: 4.36 M/UL — SIGNIFICANT CHANGE UP (ref 3.8–5.2)
RBC # FLD: 14.2 % — SIGNIFICANT CHANGE UP (ref 10.3–14.5)
SODIUM SERPL-SCNC: 139 MMOL/L — SIGNIFICANT CHANGE UP (ref 135–145)
WBC # BLD: 6.89 K/UL — SIGNIFICANT CHANGE UP (ref 3.8–10.5)
WBC # FLD AUTO: 6.89 K/UL — SIGNIFICANT CHANGE UP (ref 3.8–10.5)

## 2022-06-01 RX ORDER — HYDROMORPHONE HYDROCHLORIDE 2 MG/ML
0.25 INJECTION INTRAMUSCULAR; INTRAVENOUS; SUBCUTANEOUS
Refills: 0 | Status: DISCONTINUED | OUTPATIENT
Start: 2022-06-01 | End: 2022-06-02

## 2022-06-01 RX ORDER — PNEUMOCOCCAL 13-VALENT CONJUGATE VACCINE 2.2; 2.2; 2.2; 2.2; 2.2; 4.4; 2.2; 2.2; 2.2; 2.2; 2.2; 2.2; 2.2 UG/.5ML; UG/.5ML; UG/.5ML; UG/.5ML; UG/.5ML; UG/.5ML; UG/.5ML; UG/.5ML; UG/.5ML; UG/.5ML; UG/.5ML; UG/.5ML; UG/.5ML
0.5 INJECTION, SUSPENSION INTRAMUSCULAR ONCE
Refills: 0 | Status: COMPLETED | OUTPATIENT
Start: 2022-06-01

## 2022-06-01 RX ORDER — ERYTHROMYCIN ETHYLSUCCINATE 400 MG
TABLET ORAL
Refills: 0 | Status: DISCONTINUED | OUTPATIENT
Start: 2022-06-01 | End: 2022-06-01

## 2022-06-01 RX ORDER — HYDROMORPHONE HYDROCHLORIDE 2 MG/ML
0.5 INJECTION INTRAMUSCULAR; INTRAVENOUS; SUBCUTANEOUS
Refills: 0 | Status: DISCONTINUED | OUTPATIENT
Start: 2022-06-01 | End: 2022-06-02

## 2022-06-01 RX ORDER — ERYTHROMYCIN ETHYLSUCCINATE 400 MG
400 TABLET ORAL EVERY 8 HOURS
Refills: 0 | Status: DISCONTINUED | OUTPATIENT
Start: 2022-06-01 | End: 2022-06-02

## 2022-06-01 RX ORDER — LIDOCAINE 4 G/100G
1 CREAM TOPICAL EVERY 24 HOURS
Refills: 0 | Status: DISCONTINUED | OUTPATIENT
Start: 2022-06-01 | End: 2022-06-02

## 2022-06-01 RX ORDER — POTASSIUM CHLORIDE 20 MEQ
10 PACKET (EA) ORAL
Refills: 0 | Status: DISCONTINUED | OUTPATIENT
Start: 2022-06-01 | End: 2022-06-01

## 2022-06-01 RX ORDER — POTASSIUM CHLORIDE 20 MEQ
10 PACKET (EA) ORAL
Refills: 0 | Status: COMPLETED | OUTPATIENT
Start: 2022-06-01 | End: 2022-06-01

## 2022-06-01 RX ADMIN — Medication 100 MICROGRAM(S): at 07:01

## 2022-06-01 RX ADMIN — Medication 400 MILLIGRAM(S): at 14:37

## 2022-06-01 RX ADMIN — Medication 15 MILLIGRAM(S): at 18:59

## 2022-06-01 RX ADMIN — Medication 100 MILLIEQUIVALENT(S): at 11:03

## 2022-06-01 RX ADMIN — Medication 15 MILLIGRAM(S): at 12:08

## 2022-06-01 RX ADMIN — ENOXAPARIN SODIUM 40 MILLIGRAM(S): 100 INJECTION SUBCUTANEOUS at 18:59

## 2022-06-01 RX ADMIN — Medication 1000 MILLIGRAM(S): at 00:19

## 2022-06-01 RX ADMIN — Medication 400 MILLIGRAM(S): at 21:32

## 2022-06-01 RX ADMIN — Medication 15 MILLIGRAM(S): at 23:07

## 2022-06-01 RX ADMIN — Medication 10 MILLIGRAM(S): at 05:29

## 2022-06-01 RX ADMIN — Medication 400 MILLIGRAM(S): at 05:29

## 2022-06-01 RX ADMIN — Medication 100 MILLIEQUIVALENT(S): at 13:02

## 2022-06-01 RX ADMIN — Medication 10 MILLIGRAM(S): at 21:32

## 2022-06-01 RX ADMIN — LIDOCAINE 1 PATCH: 4 CREAM TOPICAL at 22:53

## 2022-06-01 RX ADMIN — Medication 1000 MILLIGRAM(S): at 12:08

## 2022-06-01 RX ADMIN — Medication 1 APPLICATION(S): at 05:47

## 2022-06-01 RX ADMIN — Medication 15 MILLIGRAM(S): at 19:42

## 2022-06-01 RX ADMIN — ESCITALOPRAM OXALATE 15 MILLIGRAM(S): 10 TABLET, FILM COATED ORAL at 14:02

## 2022-06-01 RX ADMIN — Medication 100 MILLIEQUIVALENT(S): at 08:56

## 2022-06-01 RX ADMIN — Medication 10 MILLIGRAM(S): at 12:07

## 2022-06-01 NOTE — PROGRESS NOTE ADULT - SUBJECTIVE AND OBJECTIVE BOX
Anesthesia Pain Management Service    SUBJECTIVE: Patient is doing well with IV PCA and no significant problems reported.     Pain Scale Score	At rest: _5/10__ 	With Activity: ___ 	[X ] Refer to charted pain scores    THERAPY:    [ ] IV PCA Morphine		[ ] 5 mg/mL	[ ] 1 mg/mL  [X ] IV PCA Hydromorphone	[ ] 5 mg/mL	[X ] 1 mg/mL  [ ] IV PCA Fentanyl		[ ] 50 micrograms/mL    Demand dose __0.2_ lockout __6_ (minutes) Continuous Rate _0__ Total: __6.7_   mg used (in past 24 hrs)      MEDICATIONS  (STANDING):  acetaminophen   IVPB .. 1000 milliGRAM(s) IV Intermittent every 6 hours  dextrose 5% + sodium chloride 0.45% with potassium chloride 20 mEq/L 1000 milliLiter(s) (100 mL/Hr) IV Continuous <Continuous>  enoxaparin Injectable 40 milliGRAM(s) SubCutaneous every 24 hours  erythromycin   IVPB      escitalopram Solution 15 milliGRAM(s) Oral daily  hydrocortisone 1% Cream 1 Application(s) Topical two times a day  ketorolac   Injectable 15 milliGRAM(s) IV Push every 6 hours  ketotifen 0.025% Ophthalmic Solution - Peds      ketotifen 0.025% Ophthalmic Solution - Peds 1 Drop(s) Both EYES daily  levothyroxine Injectable 100 MICROGram(s) IV Push <User Schedule>  lidocaine   4% Patch 1 Patch Transdermal every 24 hours  metoclopramide Injectable 10 milliGRAM(s) IV Push every 8 hours  pantoprazole  Injectable 40 milliGRAM(s) IV Push daily  pneumococcal  13 Vaccine (PREVNAR 13) 0.5 milliLiter(s) IntraMuscular once  potassium chloride  10 mEq/100 mL IVPB 10 milliEquivalent(s) IV Intermittent every 1 hour    MEDICATIONS  (PRN):  diphenhydrAMINE Injectable 25 milliGRAM(s) IV Push every 8 hours PRN Rash and/or Itching  HYDROmorphone  Injectable 0.5 milliGRAM(s) IV Push every 3 hours PRN Moderate to Severe Pain (4 - 10)  HYDROmorphone  Injectable 0.25 milliGRAM(s) IV Push every 3 hours PRN Severe Breakthrough Pain (7 - 10)  naloxone Injectable 0.1 milliGRAM(s) IV Push every 3 minutes PRN For ANY of the following changes in patient status:  A. RR LESS THAN 10 breaths per minute, B. Oxygen saturation LESS THAN 90%, C. Sedation score of 6  ondansetron Injectable 4 milliGRAM(s) IV Push every 6 hours PRN Nausea       OBJECTIVE: Patient laying in bed. NG tube in place.    Sedation Score:	[ X] Alert	[ ] Drowsy 	[ ] Arousable	[ ] Asleep	[ ] Unresponsive    Side Effects:	[X ] None	[ ] Nausea	[ ] Vomiting	[ ] Pruritus  		[ ] Other:    Vital Signs Last 24 Hrs  T(C): 36.8 (01 Jun 2022 05:41), Max: 36.9 (31 May 2022 19:46)  T(F): 98.2 (01 Jun 2022 05:41), Max: 98.5 (31 May 2022 19:46)  HR: 75 (01 Jun 2022 05:41) (65 - 75)  BP: 118/73 (01 Jun 2022 05:41) (103/70 - 118/73)  BP(mean): --  RR: 18 (01 Jun 2022 05:41) (18 - 18)  SpO2: 100% (01 Jun 2022 05:41) (94% - 100%)    ASSESSMENT/ PLAN    Therapy to  be:	[ ] Continue   [ X] Discontinued   [X ] Change to prn Analgesics    Documentation and Verification of current medications:   [X] Done	[ ] Not done, not elligible    Comments: Discussed patient with primary team. IV PCA discontinued. Ordered IV Dilaudid 0.5mg q3h prn for moderate to severe pain along with IV Dilaudid 0.25mg Q3H PRN for severe breakthrough pain, may increase breakthrough IV Dilaudid dose to 0.5mg if pain not managed with the current dose of 0.25mg. PRN Oral/IV opioids and/or Adjuvant non-opioid medication to be ordered at this point.    Progress Note written now but Patient was seen earlier. Hossein Lind is a 60 y.o.  male and presents with    Chief Complaint   Patient presents with    Pre-op Exam     eye surgery           Subjective:  Mr. James Alcantara presents for pre-op clearance for left eye surgery for cataract. he c/o blurry vision. he denies eye pain. Hypertension  Patient is here for follow-up of hypertension. He indicates that he is feeling well and denies any symptoms referable to his hypertension. He is not exercising and is adherent to low salt diet. Blood pressure is well controlled at home. Use of agents associated with hypertension: none.     Cardiovascular Review:  The patient has hypertension, CHF and obesity. Diet and Lifestyle: generally follows a low fat low cholesterol diet, generally follows a low sodium diet, does not rigorously follow a diabetic diet, exercises regularly, nonsmoker, he reports that he had increased calorie intake and salt intake. Home BP Monitoring: is not measured at home. Pertinent ROS: taking medications as instructed, no medication side effects noted, no TIA's, no chest pain on exertion, noting swelling of ankles.          Patient Active Problem List   Diagnosis Code    HTN (hypertension) I10    History of DVT (deep vein thrombosis) D13.715    Diastolic CHF, chronic (HCC) I50.32    Chronic venous stasis dermatitis I87.2    Hyperlipidemia E78.5    NSVT (nonsustained ventricular tachycardia) ([facility]) I47.2    CKD (chronic kidney disease) N18.9     Patient Active Problem List    Diagnosis Date Noted    NSVT (nonsustained ventricular tachycardia) ([facility] 08/27/2015    CKD (chronic kidney disease) 08/27/2015    History of DVT (deep vein thrombosis) 37/08/8980    Diastolic CHF, chronic ([facility] 08/20/2015    Chronic venous stasis dermatitis 08/20/2015    Hyperlipidemia 08/20/2015    HTN (hypertension) 09/12/2014     Current Outpatient Prescriptions   Medication Sig Dispense Refill    potassium chloride SR (KLOR-CON 10) 10 mEq tablet TAKE 1 TAB BY MOUTH DAILY. 30 Tab 1    penicillin v potassium (VEETID) 250 mg tablet TAKE 1 TABLET BY MOUTH TWICE A  Tab 4    lisinopril (PRINIVIL, ZESTRIL) 30 mg tablet TAKE 1 TAB BY MOUTH DAILY. INDICATIONS: HYPERTENSION 90 Tab 1    urea 50 % topical cream Apply  to affected area daily as needed. 255 g 1    metoprolol tartrate (LOPRESSOR) 25 mg tablet Take 1 Tab by mouth two (2) times a day. 180 Tab 1    simvastatin (ZOCOR) 20 mg tablet Take 1 Tab by mouth nightly. 90 Tab 1    rivaroxaban (XARELTO) 20 mg tab tablet Take 1 Tab by mouth daily. 30 Tab 11    furosemide (LASIX) 40 mg tablet Take 1 Tab by mouth daily. 90 Tab 3     No Known Allergies  Past Medical History:   Diagnosis Date    Arthritis     High cholesterol     HTN (hypertension)     Obesity     Prediabetes     Thromboembolus ([address]      Past Surgical History:   Procedure Laterality Date    HX COLONOSCOPY      HX HERNIA REPAIR      HX ORTHOPAEDIC      HX TYMPANOSTOMY       Family History   Problem Relation Age of Onset    Cancer Father      Social History   Substance Use Topics    Smoking status: Never Smoker    Smokeless tobacco: Never Used    Alcohol use No       ROS   General ROS: positive for  - weight gain  negative for - chills or fever  Ophthalmic ROS: positive for - uses glasses  ENT ROS: negative for - headaches  Endocrine ROS: negative for - polydipsia/polyuria or temperature intolerance  Respiratory ROS: no cough, shortness of breath, or wheezing  Cardiovascular ROS: positive for - dyspnea on exertion  negative for - chest pain  Gastrointestinal ROS: no abdominal pain, change in bowel habits, or black or bloody stools  Genito-Urinary ROS: no dysuria, trouble voiding, or hematuria  Neurological ROS: negative for - numbness/tingling or weakness  Dermatological ROS: positive for - rash with scale    All other systems reviewed and are negative.       Objective:  Vitals:    07/31/17 1143   BP: 127/81   Pulse: 69   Resp: 18   Temp: 97.1 °F (36.2 °C)   TempSrc: Oral   SpO2: 90%   Weight: 278 lb 6.4 oz (126.3 kg)   Height: 5' 7\" (1.702 m)   PainSc:   0 - No pain       alert, well appearing, and in no distress, oriented to person, place, and time and morbid obesity  Mental status - normal mood, behavior, speech, dress, motor activity, and thought processes  Chest - clear to auscultation, no wheezes, rales or rhonchi, symmetric air entry  Heart - normal rate, regular rhythm, normal S1, S2, no murmurs, rubs, clicks or gallops  Extremities - peripheral pulses normal, no pedal edema, no clubbing or cyanosis  Skin - scale on lower extremities    Assessment/Plan:    1. Pre-op exam  Cleared for surgery    2. Chronic systolic congestive heart failure (HCC)  Symptoms controlled with current medications; continue  - potassium chloride SR (KLOR-CON 10) 10 mEq tablet; TAKE 1 TAB BY MOUTH DAILY. Dispense: 90 Tab; Refill: 3    3. Essential hypertension with goal blood pressure less than 140/90  At goal; continue treatment  - furosemide (LASIX) 40 mg tablet; Take 1 Tab by mouth daily. Dispense: 90 Tab; Refill: 3    4. Mixed hyperlipidemia  No side effects  - simvastatin (ZOCOR) 20 mg tablet; Take 1 Tab by mouth nightly. Dispense: 90 Tab; Refill: 3    5. Morbid obesity with BMI of 40.0-44.9, adult ([facility]  I have reviewed/discussed the above normal BMI with the patient. I have recommended the following interventions: dietary management education, guidance, and counseling and encourage exercise . [address] History of DVT (deep vein thrombosis)  Continue treatment  - rivaroxaban (XARELTO) 20 mg tab tablet; Take 1 Tab by mouth daily. Dispense: 30 Tab; Refill: 11    Lab review: no lab studies available for review at time of visit      I have discussed the diagnosis with the patient and the intended plan as seen in the above orders. The patient has received an after-visit summary and questions were answered concerning future plans.   I have discussed medication side effects and warnings with the patient as well. I have reviewed the plan of care with the patient, accepted their input and they are in agreement with the treatment goals.      Follow-up Disposition:  Return in about 1 month (around 8/31/2017) for annual exam.

## 2022-06-01 NOTE — PROGRESS NOTE ADULT - SUBJECTIVE AND OBJECTIVE BOX
Subjective:   Patient seen at bedside this AM. Reports feeling well, without complaints. Denies chest pain, SOB. Tolerating diet without N/V.     24h Events:   - Overnight, no acute events    Objective:  Vital Signs  T(C): 36.7 (06-01 @ 00:16), Max: 36.9 (05-31 @ 19:46)  HR: 65 (06-01 @ 00:16) (57 - 80)  BP: 105/70 (06-01 @ 00:16) (97/59 - 116/68)  RR: 18 (06-01 @ 00:16) (17 - 18)  SpO2: 95% (06-01 @ 00:16) (91% - 98%)  05-30-22 @ 07:01  -  05-31-22 @ 07:00  --------------------------------------------------------  IN:  Total IN: 0 mL    OUT:    Nasogastric/Oral tube (mL): 1000 mL    Voided (mL): 2250 mL  Total OUT: 3250 mL    Total NET: -3250 mL      05-31-22 @ 07:01  -  06-01-22 @ 01:15  --------------------------------------------------------  IN:  Total IN: 0 mL    OUT:    Nasogastric/Oral tube (mL): 850 mL    Voided (mL): 1375 mL  Total OUT: 2225 mL    Total NET: -2225 mL        PHYSICAL EXAM:    GENERAL: NAD, lying in bed comfortably  Abdomen: Soft, non distended. No pain on palpation. Provena vac  HEENT: NGT      Labs:                        12.0   7.43  )-----------( 332      ( 31 May 2022 05:43 )             37.6   05-31    138  |  103  |  5<L>  ----------------------------<  94  4.5   |  23  |  0.61    Ca    9.3      31 May 2022 05:43  Phos  4.4     05-31  Mg     2.00     05-31    TPro  6.3  /  Alb  3.3  /  TBili  0.9  /  DBili  x   /  AST  57<H>  /  ALT  36<H>  /  AlkPhos  89  05-30    CAPILLARY BLOOD GLUCOSE          Medications:   MEDICATIONS  (STANDING):  acetaminophen   IVPB .. 1000 milliGRAM(s) IV Intermittent every 6 hours  dextrose 5% + sodium chloride 0.45% with potassium chloride 20 mEq/L 1000 milliLiter(s) (100 mL/Hr) IV Continuous <Continuous>  enoxaparin Injectable 40 milliGRAM(s) SubCutaneous every 24 hours  escitalopram Solution 15 milliGRAM(s) Oral daily  hydrocortisone 1% Cream 1 Application(s) Topical two times a day  HYDROmorphone PCA (1 mG/mL) 30 milliLiter(s) PCA Continuous PCA Continuous  ketorolac   Injectable 15 milliGRAM(s) IV Push every 6 hours  ketotifen 0.025% Ophthalmic Solution - Peds      ketotifen 0.025% Ophthalmic Solution - Peds 1 Drop(s) Both EYES daily  levothyroxine Injectable 100 MICROGram(s) IV Push <User Schedule>  metoclopramide Injectable 10 milliGRAM(s) IV Push every 8 hours  pantoprazole  Injectable 40 milliGRAM(s) IV Push daily    MEDICATIONS  (PRN):  diphenhydrAMINE Injectable 25 milliGRAM(s) IV Push every 8 hours PRN Rash and/or Itching  HYDROmorphone PCA (1 mG/mL) Rescue Clinician Bolus 0.5 milliGRAM(s) IV Push every 15 minutes PRN for Pain Scale GREATER THAN 6  naloxone Injectable 0.1 milliGRAM(s) IV Push every 3 minutes PRN For ANY of the following changes in patient status:  A. RR LESS THAN 10 breaths per minute, B. Oxygen saturation LESS THAN 90%, C. Sedation score of 6  ondansetron Injectable 4 milliGRAM(s) IV Push every 6 hours PRN Nausea      Imaging:     Subjective:   Patient seen at bedside this AM. Reports feeling well, without complaints. Denies chest pain, SOB. NPO with NGT.    24h Events:   - Overnight, no acute events    Objective:  Vital Signs  T(C): 36.7 (06-01 @ 00:16), Max: 36.9 (05-31 @ 19:46)  HR: 65 (06-01 @ 00:16) (57 - 80)  BP: 105/70 (06-01 @ 00:16) (97/59 - 116/68)  RR: 18 (06-01 @ 00:16) (17 - 18)  SpO2: 95% (06-01 @ 00:16) (91% - 98%)  05-30-22 @ 07:01  -  05-31-22 @ 07:00  --------------------------------------------------------  IN:  Total IN: 0 mL    OUT:    Nasogastric/Oral tube (mL): 1000 mL    Voided (mL): 2250 mL  Total OUT: 3250 mL    Total NET: -3250 mL      05-31-22 @ 07:01  -  06-01-22 @ 01:15  --------------------------------------------------------  IN:  Total IN: 0 mL    OUT:    Nasogastric/Oral tube (mL): 850 mL    Voided (mL): 1375 mL  Total OUT: 2225 mL    Total NET: -2225 mL        PHYSICAL EXAM:    GENERAL: NAD, lying in bed comfortably  Abdomen: Soft, non distended. No pain on palpation. NGT in place, output bilious, prevena out today.   HEENT: NGT      Labs:                        12.0   7.43  )-----------( 332      ( 31 May 2022 05:43 )             37.6   05-31    138  |  103  |  5<L>  ----------------------------<  94  4.5   |  23  |  0.61    Ca    9.3      31 May 2022 05:43  Phos  4.4     05-31  Mg     2.00     05-31    TPro  6.3  /  Alb  3.3  /  TBili  0.9  /  DBili  x   /  AST  57<H>  /  ALT  36<H>  /  AlkPhos  89  05-30    CAPILLARY BLOOD GLUCOSE          Medications:   MEDICATIONS  (STANDING):  acetaminophen   IVPB .. 1000 milliGRAM(s) IV Intermittent every 6 hours  dextrose 5% + sodium chloride 0.45% with potassium chloride 20 mEq/L 1000 milliLiter(s) (100 mL/Hr) IV Continuous <Continuous>  enoxaparin Injectable 40 milliGRAM(s) SubCutaneous every 24 hours  escitalopram Solution 15 milliGRAM(s) Oral daily  hydrocortisone 1% Cream 1 Application(s) Topical two times a day  HYDROmorphone PCA (1 mG/mL) 30 milliLiter(s) PCA Continuous PCA Continuous  ketorolac   Injectable 15 milliGRAM(s) IV Push every 6 hours  ketotifen 0.025% Ophthalmic Solution - Peds      ketotifen 0.025% Ophthalmic Solution - Peds 1 Drop(s) Both EYES daily  levothyroxine Injectable 100 MICROGram(s) IV Push <User Schedule>  metoclopramide Injectable 10 milliGRAM(s) IV Push every 8 hours  pantoprazole  Injectable 40 milliGRAM(s) IV Push daily    MEDICATIONS  (PRN):  diphenhydrAMINE Injectable 25 milliGRAM(s) IV Push every 8 hours PRN Rash and/or Itching  HYDROmorphone PCA (1 mG/mL) Rescue Clinician Bolus 0.5 milliGRAM(s) IV Push every 15 minutes PRN for Pain Scale GREATER THAN 6  naloxone Injectable 0.1 milliGRAM(s) IV Push every 3 minutes PRN For ANY of the following changes in patient status:  A. RR LESS THAN 10 breaths per minute, B. Oxygen saturation LESS THAN 90%, C. Sedation score of 6  ondansetron Injectable 4 milliGRAM(s) IV Push every 6 hours PRN Nausea      Imaging:

## 2022-06-01 NOTE — PROGRESS NOTE ADULT - SUBJECTIVE AND OBJECTIVE BOX
Anesthesia Pain Management Service- Attending Addendum    SUBJECTIVE: Patient's pain control adequate    Therapy:	  [ X] IV PCA	   [ ] Epidural           [ ] s/p Spinal Opoid              [ ] Postpartum infusion	  [ ] Patient controlled regional anesthesia (PCRA)    [ ] prn Analgesics    Allergies    No Known Allergies    Intolerances      MEDICATIONS  (STANDING):  acetaminophen   IVPB .. 1000 milliGRAM(s) IV Intermittent every 6 hours  dextrose 5% + sodium chloride 0.45% with potassium chloride 20 mEq/L 1000 milliLiter(s) (100 mL/Hr) IV Continuous <Continuous>  enoxaparin Injectable 40 milliGRAM(s) SubCutaneous every 24 hours  erythromycin   IVPB      escitalopram Solution 15 milliGRAM(s) Oral daily  hydrocortisone 1% Cream 1 Application(s) Topical two times a day  ketorolac   Injectable 15 milliGRAM(s) IV Push every 6 hours  ketotifen 0.025% Ophthalmic Solution - Peds      ketotifen 0.025% Ophthalmic Solution - Peds 1 Drop(s) Both EYES daily  levothyroxine Injectable 100 MICROGram(s) IV Push <User Schedule>  lidocaine   4% Patch 1 Patch Transdermal every 24 hours  metoclopramide Injectable 10 milliGRAM(s) IV Push every 8 hours  pantoprazole  Injectable 40 milliGRAM(s) IV Push daily  pneumococcal  13 Vaccine (PREVNAR 13) 0.5 milliLiter(s) IntraMuscular once  potassium chloride  10 mEq/100 mL IVPB 10 milliEquivalent(s) IV Intermittent every 1 hour    MEDICATIONS  (PRN):  diphenhydrAMINE Injectable 25 milliGRAM(s) IV Push every 8 hours PRN Rash and/or Itching  naloxone Injectable 0.1 milliGRAM(s) IV Push every 3 minutes PRN For ANY of the following changes in patient status:  A. RR LESS THAN 10 breaths per minute, B. Oxygen saturation LESS THAN 90%, C. Sedation score of 6  ondansetron Injectable 4 milliGRAM(s) IV Push every 6 hours PRN Nausea      OBJECTIVE:   [X] No new signs     [ ] Other:    Side Effects:  [X ] None			[ ] Other:      ASSESSMENT/PLAN  -Discontinue current therapy    [ ] Therapy changed to:    [ ] IV PCA       [ ] Epidural     [ X] prn Analgesics     Comments: Pain management per primary team, APS to sign off    Note entered after patient seen

## 2022-06-01 NOTE — PROGRESS NOTE ADULT - ASSESSMENT
46F s/p  distal panc, splenectomy, left adrenalectomy 5/27.    PLAN  - Diet: NPO, NGT  - Pain control  - Hydrocortisone, appreciate Endo recs  - C/w prevena - take down 6/1  - DVT ppx  - OOB/incentive spirometry     D Team Surgery   #[id number]       46F s/p  distal panc, splenectomy, left adrenalectomy 5/27.    PLAN  - Diet: NPO, NGT  - NGT clamp trial 4 times today  - Pain control  - Hydrocortisone, appreciate Endo recs  - d/c'ed prevena  - DVT ppx  - OOB/incentive spirometry     D Team Surgery   #[id number]

## 2022-06-02 ENCOUNTER — TRANSCRIPTION ENCOUNTER (OUTPATIENT)
Age: 47
End: 2022-06-02

## 2022-06-02 RX ORDER — ACETAMINOPHEN 500 MG
3 TABLET ORAL
Qty: 30 | Refills: 0
Start: 2022-06-02

## 2022-06-02 RX ORDER — LIDOCAINE 4 G/100G
1 CREAM TOPICAL DAILY
Refills: 0 | Status: DISCONTINUED | OUTPATIENT
Start: 2022-06-02 | End: 2022-06-02

## 2022-06-02 RX ORDER — LEVOTHYROXINE SODIUM 125 MCG
137 TABLET ORAL DAILY
Refills: 0 | Status: DISCONTINUED | OUTPATIENT
Start: 2022-06-03 | End: 2022-06-03

## 2022-06-02 RX ORDER — METOCLOPRAMIDE HCL 10 MG
10 TABLET ORAL EVERY 8 HOURS
Refills: 0 | Status: DISCONTINUED | OUTPATIENT
Start: 2022-06-02 | End: 2022-06-03

## 2022-06-02 RX ORDER — ERYTHROMYCIN ETHYLSUCCINATE 400 MG
400 TABLET ORAL EVERY 8 HOURS
Refills: 0 | Status: DISCONTINUED | OUTPATIENT
Start: 2022-06-02 | End: 2022-06-02

## 2022-06-02 RX ORDER — ESCITALOPRAM OXALATE 10 MG/1
15 TABLET, FILM COATED ORAL DAILY
Refills: 0 | Status: DISCONTINUED | OUTPATIENT
Start: 2022-06-03 | End: 2022-06-03

## 2022-06-02 RX ORDER — OXYCODONE HYDROCHLORIDE 5 MG/1
5 TABLET ORAL EVERY 4 HOURS
Refills: 0 | Status: DISCONTINUED | OUTPATIENT
Start: 2022-06-02 | End: 2022-06-03

## 2022-06-02 RX ORDER — ACETAMINOPHEN 500 MG
975 TABLET ORAL EVERY 6 HOURS
Refills: 0 | Status: DISCONTINUED | OUTPATIENT
Start: 2022-06-02 | End: 2022-06-03

## 2022-06-02 RX ORDER — OXYCODONE HYDROCHLORIDE 5 MG/1
10 TABLET ORAL EVERY 4 HOURS
Refills: 0 | Status: DISCONTINUED | OUTPATIENT
Start: 2022-06-02 | End: 2022-06-03

## 2022-06-02 RX ORDER — DIPHENHYDRAMINE HCL 50 MG
25 CAPSULE ORAL EVERY 8 HOURS
Refills: 0 | Status: DISCONTINUED | OUTPATIENT
Start: 2022-06-02 | End: 2022-06-03

## 2022-06-02 RX ORDER — ONDANSETRON 8 MG/1
4 TABLET, FILM COATED ORAL EVERY 8 HOURS
Refills: 0 | Status: DISCONTINUED | OUTPATIENT
Start: 2022-06-02 | End: 2022-06-03

## 2022-06-02 RX ORDER — LIDOCAINE 4 G/100G
2 CREAM TOPICAL DAILY
Refills: 0 | Status: DISCONTINUED | OUTPATIENT
Start: 2022-06-02 | End: 2022-06-03

## 2022-06-02 RX ORDER — PANTOPRAZOLE SODIUM 20 MG/1
40 TABLET, DELAYED RELEASE ORAL
Refills: 0 | Status: DISCONTINUED | OUTPATIENT
Start: 2022-06-03 | End: 2022-06-03

## 2022-06-02 RX ADMIN — Medication 975 MILLIGRAM(S): at 18:58

## 2022-06-02 RX ADMIN — Medication 10 MILLIGRAM(S): at 21:00

## 2022-06-02 RX ADMIN — Medication 975 MILLIGRAM(S): at 23:58

## 2022-06-02 RX ADMIN — ESCITALOPRAM OXALATE 15 MILLIGRAM(S): 10 TABLET, FILM COATED ORAL at 12:09

## 2022-06-02 RX ADMIN — Medication 15 MILLIGRAM(S): at 16:38

## 2022-06-02 RX ADMIN — LIDOCAINE 2 PATCH: 4 CREAM TOPICAL at 21:00

## 2022-06-02 RX ADMIN — DEXTROSE MONOHYDRATE, SODIUM CHLORIDE, AND POTASSIUM CHLORIDE 75 MILLILITER(S): 50; .745; 4.5 INJECTION, SOLUTION INTRAVENOUS at 08:22

## 2022-06-02 RX ADMIN — Medication 15 MILLIGRAM(S): at 05:26

## 2022-06-02 RX ADMIN — Medication 975 MILLIGRAM(S): at 12:38

## 2022-06-02 RX ADMIN — LIDOCAINE 1 PATCH: 4 CREAM TOPICAL at 21:00

## 2022-06-02 RX ADMIN — Medication 15 MILLIGRAM(S): at 21:29

## 2022-06-02 RX ADMIN — PANTOPRAZOLE SODIUM 40 MILLIGRAM(S): 20 TABLET, DELAYED RELEASE ORAL at 12:03

## 2022-06-02 RX ADMIN — Medication 10 MILLIGRAM(S): at 05:25

## 2022-06-02 RX ADMIN — LIDOCAINE 1 PATCH: 4 CREAM TOPICAL at 19:00

## 2022-06-02 RX ADMIN — ENOXAPARIN SODIUM 40 MILLIGRAM(S): 100 INJECTION SUBCUTANEOUS at 18:57

## 2022-06-02 RX ADMIN — LIDOCAINE 1 PATCH: 4 CREAM TOPICAL at 18:45

## 2022-06-02 RX ADMIN — LIDOCAINE 1 PATCH: 4 CREAM TOPICAL at 12:22

## 2022-06-02 RX ADMIN — Medication 100 MICROGRAM(S): at 05:26

## 2022-06-02 RX ADMIN — Medication 15 MILLIGRAM(S): at 17:08

## 2022-06-02 RX ADMIN — Medication 975 MILLIGRAM(S): at 12:08

## 2022-06-02 RX ADMIN — DEXTROSE MONOHYDRATE, SODIUM CHLORIDE, AND POTASSIUM CHLORIDE 42 MILLILITER(S): 50; .745; 4.5 INJECTION, SOLUTION INTRAVENOUS at 12:34

## 2022-06-02 RX ADMIN — Medication 400 MILLIGRAM(S): at 05:25

## 2022-06-02 RX ADMIN — Medication 400 MILLIGRAM(S): at 13:06

## 2022-06-02 NOTE — DIETITIAN INITIAL EVALUATION ADULT - EDUCATION DIETARY MODIFICATIONS
1. Continue with Trileptal 150 mg two times a day. 2. CBC, HFP  3. Follow-up with ophthalmology as scheduled  4. Follow up with PM&R re: botox injections for difficult to control headache   5. Follow up in 6 months or sooner if needed. 6. Call if any questions or concerns.
teach back/(2) meets goals/outcomes/verbalization

## 2022-06-02 NOTE — DIETITIAN INITIAL EVALUATION ADULT - PERTINENT MEDS FT
MEDICATIONS  (STANDING):  acetaminophen     Tablet .. 975 milliGRAM(s) Oral every 6 hours  dextrose 5% + sodium chloride 0.45% with potassium chloride 20 mEq/L 1000 milliLiter(s) (42 mL/Hr) IV Continuous <Continuous>  enoxaparin Injectable 40 milliGRAM(s) SubCutaneous every 24 hours  erythromycin    ethylsuccinate Suspension 40 mG/mL 400 milliGRAM(s) Oral every 8 hours  escitalopram Solution 15 milliGRAM(s) Oral daily  ketorolac   Injectable 15 milliGRAM(s) IV Push every 6 hours  levothyroxine Injectable 100 MICROGram(s) IV Push <User Schedule>  lidocaine   4% Patch 2 Patch Transdermal daily  metoclopramide Injectable 10 milliGRAM(s) IV Push every 8 hours  pantoprazole  Injectable 40 milliGRAM(s) IV Push daily  pneumococcal  13 Vaccine (PREVNAR 13) 0.5 milliLiter(s) IntraMuscular once    MEDICATIONS  (PRN):  diphenhydrAMINE Injectable 25 milliGRAM(s) IV Push every 8 hours PRN Rash and/or Itching  naloxone Injectable 0.1 milliGRAM(s) IV Push every 3 minutes PRN For ANY of the following changes in patient status:  A. RR LESS THAN 10 breaths per minute, B. Oxygen saturation LESS THAN 90%, C. Sedation score of 6  ondansetron Injectable 4 milliGRAM(s) IV Push every 6 hours PRN Nausea  oxyCODONE    IR 5 milliGRAM(s) Oral every 4 hours PRN Moderate Pain (4 - 6)  oxyCODONE    IR 10 milliGRAM(s) Oral every 4 hours PRN Severe Pain (7 - 10)

## 2022-06-02 NOTE — DIETITIAN INITIAL EVALUATION ADULT - PERTINENT LABORATORY DATA
06-01 Na 139 mmol/L Glu 89 mg/dL K+ 3.5 mmol/L Cr 0.58 mg/dL BUN 5 mg/dL<L> Phos 3.8 mg/dL  06-01

## 2022-06-02 NOTE — DISCHARGE NOTE PROVIDER - NSDCFUSCHEDAPPT_GEN_ALL_CORE_FT
Caprice Gibson  [facility]  Alex Coulter  Scheduled Appointment: 06/10/2022

## 2022-06-02 NOTE — DIETITIAN INITIAL EVALUATION ADULT - OTHER INFO
Per chart, 46F s/p distal panc, splenectomy, left adrenalectomy 5/27.    Nutrition interview: No recent episodes of nausea, vomiting, diarrhea or constipation, BM noted today per pt. Denies any chewing/swallowing difficulties. No food allergies. Stated UBW: ~164#, in line with current wt (162.1# 5/27). Food preferences explored and noted. Intake is 100% per pt of clear liquid diet. Feeding skills: independent.     Pt states she is very hungry and has a good appetite, wants diet to be advanced to regular foods. RD to confer with MD as pt was just placed on CLD today. Will recommend Ensure Clear 3x daily (540 garett and 24 gm protein) in the interim of diet advancement.     RD educated pt verbally on Low fat diet when leaving the hospital. Pt confirms understanding and compliance to this information/diet.

## 2022-06-02 NOTE — DIETITIAN INITIAL EVALUATION ADULT - ORAL INTAKE PTA/DIET HISTORY
Pt lives at home with her mom where they both cook and grocery shop together for the household. Pt not following any specific diet, nor taking any vitamins or supplements PTA. Appetite reported as good PTA.

## 2022-06-02 NOTE — DISCHARGE NOTE PROVIDER - NSDCMRMEDTOKEN_GEN_ALL_CORE_FT
Claritin 10 mg oral tablet: 1 tab(s) orally once a day  levothyroxine 137 mcg (0.137 mg) oral tablet: 1 tab(s) orally once a day AM  Lexapro: 15 milligram(s) orally once a day AM  omeprazole 40 mg oral delayed release capsule: 1 cap(s) orally once a day AM  Xanax 0.5 mg oral tablet: 1 tab(s) orally once a day, As Needed   Claritin 10 mg oral tablet: 1 tab(s) orally once a day  levothyroxine 137 mcg (0.137 mg) oral tablet: 1 tab(s) orally once a day AM  Lexapro: 15 milligram(s) orally once a day AM  Lidocare Pain Relief Patch 4% topical film: Apply topically to affected area in abdomen every 24 hours MDD:1  omeprazole 40 mg oral delayed release capsule: 1 cap(s) orally once a day AM  Roxicodone 5 mg oral tablet: 1 tab(s) orally every 6 hours, As Needed -Moderate Pain (4 - 6) - for severe pain MDD:4   Tylenol 325 mg oral tablet: 3 tab(s) orally every 6 hours, As Needed -for mild pain - for moderate pain   Xanax 0.5 mg oral tablet: 1 tab(s) orally once a day, As Needed

## 2022-06-02 NOTE — DISCHARGE NOTE PROVIDER - NSDCCPTREATMENT_GEN_ALL_CORE_FT
PRINCIPAL PROCEDURE  Procedure: Pancreatectomy, distal, robot-assisted, laparoscopic, with conversion to open procedure if indicated, with splenectomy if indicated  Findings and Treatment:       SECONDARY PROCEDURE  Procedure: Adrenalectomy, laparoscopic, robot-assisted  Findings and Treatment:

## 2022-06-02 NOTE — PROVIDER CONTACT NOTE (OTHER) - SITUATION
pt refusing AM labs, RN explained to patient importance of AM lab draws, pt verbalized understanding via teachback, pt still refusing

## 2022-06-02 NOTE — DISCHARGE NOTE PROVIDER - NSDCCPCAREPLAN_GEN_ALL_CORE_FT
PRINCIPAL DISCHARGE DIAGNOSIS  Diagnosis: Malignant neoplasm of pancreas  Assessment and Plan of Treatment:

## 2022-06-02 NOTE — PROGRESS NOTE ADULT - SUBJECTIVE AND OBJECTIVE BOX
Subjective:   Patient seen at bedside this AM. Reports feeling well, without complaints. Denies chest pain, SOB. Tolerating diet without N/V.     24h Events:   - Overnight, no acute events    Objective:  Vital Signs  T(C): 36.6 (06-02 @ 00:21), Max: 37.4 (06-01 @ 12:24)  HR: 80 (06-02 @ 00:21) (72 - 92)  BP: 117/78 (06-02 @ 00:21) (114/88 - 124/80)  RR: 18 (06-02 @ 00:21) (18 - 18)  SpO2: 99% (06-02 @ 00:21) (98% - 100%)  05-31-22 @ 07:01  -  06-01-22 @ 07:00  --------------------------------------------------------  IN:  Total IN: 0 mL    OUT:    Nasogastric/Oral tube (mL): 950 mL    Voided (mL): 1875 mL  Total OUT: 2825 mL    Total NET: -2825 mL      06-01-22 @ 07:01  -  06-02-22 @ 01:28  --------------------------------------------------------  IN:  Total IN: 0 mL    OUT:    Nasogastric/Oral tube (mL): 30 mL    Voided (mL): 1400 mL  Total OUT: 1430 mL    Total NET: -1430 mL          Labs:                        13.8   6.89  )-----------( 420      ( 01 Jun 2022 06:37 )             42.1   06-01    139  |  103  |  5<L>  ----------------------------<  89  3.5   |  25  |  0.58    Ca    9.2      01 Jun 2022 06:37  Phos  3.8     06-01  Mg     2.00     06-01    TPro  6.9  /  Alb  3.4  /  TBili  0.7  /  DBili  x   /  AST  41<H>  /  ALT  31  /  AlkPhos  130<H>  06-01    CAPILLARY BLOOD GLUCOSE        PHYSICAL EXAM:    GENERAL: NAD, lying in bed comfortably  Abdomen: Soft, non distended. No pain on palpation. NGT in place, output bilious, prevena out today.   HEENT: NGT    Medications:   MEDICATIONS  (STANDING):  dextrose 5% + sodium chloride 0.45% with potassium chloride 20 mEq/L 1000 milliLiter(s) (100 mL/Hr) IV Continuous <Continuous>  enoxaparin Injectable 40 milliGRAM(s) SubCutaneous every 24 hours  erythromycin    ethylsuccinate Suspension 40 mG/mL 400 milliGRAM(s) Oral every 8 hours  escitalopram Solution 15 milliGRAM(s) Oral daily  ketorolac   Injectable 15 milliGRAM(s) IV Push every 6 hours  levothyroxine Injectable 100 MICROGram(s) IV Push <User Schedule>  lidocaine   4% Patch 1 Patch Transdermal every 24 hours  metoclopramide Injectable 10 milliGRAM(s) IV Push every 8 hours  pantoprazole  Injectable 40 milliGRAM(s) IV Push daily  pneumococcal  13 Vaccine (PREVNAR 13) 0.5 milliLiter(s) IntraMuscular once    MEDICATIONS  (PRN):  diphenhydrAMINE Injectable 25 milliGRAM(s) IV Push every 8 hours PRN Rash and/or Itching  HYDROmorphone  Injectable 0.5 milliGRAM(s) IV Push every 3 hours PRN Moderate to Severe Pain (4 - 10)  HYDROmorphone  Injectable 0.25 milliGRAM(s) IV Push every 3 hours PRN Severe Breakthrough Pain (7 - 10)  naloxone Injectable 0.1 milliGRAM(s) IV Push every 3 minutes PRN For ANY of the following changes in patient status:  A. RR LESS THAN 10 breaths per minute, B. Oxygen saturation LESS THAN 90%, C. Sedation score of 6  ondansetron Injectable 4 milliGRAM(s) IV Push every 6 hours PRN Nausea      Imaging:     Subjective:   Patient seen at bedside this AM. Reports feeling well, without complaints. Pain well controlled. Denies chest pain, SOB, N/V. Passing flatus, no bowel movements.    Objective: NGT removed yesterday,     24h Events:   - Overnight, no acute events    Objective:  Vital Signs  T(C): 36.6 (06-02 @ 00:21), Max: 37.4 (06-01 @ 12:24)  HR: 80 (06-02 @ 00:21) (72 - 92)  BP: 117/78 (06-02 @ 00:21) (114/88 - 124/80)  RR: 18 (06-02 @ 00:21) (18 - 18)  SpO2: 99% (06-02 @ 00:21) (98% - 100%)  05-31-22 @ 07:01  -  06-01-22 @ 07:00  --------------------------------------------------------  IN:  Total IN: 0 mL    OUT:    Nasogastric/Oral tube (mL): 950 mL    Voided (mL): 1875 mL  Total OUT: 2825 mL    Total NET: -2825 mL      06-01-22 @ 07:01  -  06-02-22 @ 01:28  --------------------------------------------------------  IN:  Total IN: 0 mL    OUT:    Nasogastric/Oral tube (mL): 30 mL    Voided (mL): 1400 mL  Total OUT: 1430 mL    Total NET: -1430 mL          Labs:                        13.8   6.89  )-----------( 420      ( 01 Jun 2022 06:37 )             42.1   06-01    139  |  103  |  5<L>  ----------------------------<  89  3.5   |  25  |  0.58    Ca    9.2      01 Jun 2022 06:37  Phos  3.8     06-01  Mg     2.00     06-01    TPro  6.9  /  Alb  3.4  /  TBili  0.7  /  DBili  x   /  AST  41<H>  /  ALT  31  /  AlkPhos  130<H>  06-01    CAPILLARY BLOOD GLUCOSE        PHYSICAL EXAM:    GENERAL: NAD, lying in bed comfortably  Abdomen: Soft, non distended. No pain on palpation.    Ext: moving all 4 extremities. WWP.    Medications:   MEDICATIONS  (STANDING):  dextrose 5% + sodium chloride 0.45% with potassium chloride 20 mEq/L 1000 milliLiter(s) (100 mL/Hr) IV Continuous <Continuous>  enoxaparin Injectable 40 milliGRAM(s) SubCutaneous every 24 hours  erythromycin    ethylsuccinate Suspension 40 mG/mL 400 milliGRAM(s) Oral every 8 hours  escitalopram Solution 15 milliGRAM(s) Oral daily  ketorolac   Injectable 15 milliGRAM(s) IV Push every 6 hours  levothyroxine Injectable 100 MICROGram(s) IV Push <User Schedule>  lidocaine   4% Patch 1 Patch Transdermal every 24 hours  metoclopramide Injectable 10 milliGRAM(s) IV Push every 8 hours  pantoprazole  Injectable 40 milliGRAM(s) IV Push daily  pneumococcal  13 Vaccine (PREVNAR 13) 0.5 milliLiter(s) IntraMuscular once    MEDICATIONS  (PRN):  diphenhydrAMINE Injectable 25 milliGRAM(s) IV Push every 8 hours PRN Rash and/or Itching  HYDROmorphone  Injectable 0.5 milliGRAM(s) IV Push every 3 hours PRN Moderate to Severe Pain (4 - 10)  HYDROmorphone  Injectable 0.25 milliGRAM(s) IV Push every 3 hours PRN Severe Breakthrough Pain (7 - 10)  naloxone Injectable 0.1 milliGRAM(s) IV Push every 3 minutes PRN For ANY of the following changes in patient status:  A. RR LESS THAN 10 breaths per minute, B. Oxygen saturation LESS THAN 90%, C. Sedation score of 6  ondansetron Injectable 4 milliGRAM(s) IV Push every 6 hours PRN Nausea      Imaging:

## 2022-06-02 NOTE — PROGRESS NOTE ADULT - ASSESSMENT
46F s/p  distal panc, splenectomy, left adrenalectomy 5/27.    PLAN  - Diet: NPO, NGT  - NGT clamp trial 4 times today  - Pain control  - Hydrocortisone, appreciate Endo recs  - d/c'd prevena  - DVT ppx  - OOB/incentive spirometry     D Team Surgery   #[id number]   46F s/p  distal panc, splenectomy, left adrenalectomy 5/27.    PLAN  - Diet: Advance to CLD  - Pain control  - Hydrocortisone, appreciate Endo recs  - EKG to eval qtc prolongation  - DVT ppx  - OOB/incentive spirometry     D Team Surgery   #[id number]

## 2022-06-02 NOTE — DIETITIAN INITIAL EVALUATION ADULT - ADD RECOMMEND
1) Recommend Ensure Clear 3x daily (540 garett and 24 gm protein) to promote optimal PO intake  2) Advance diet to Low Fat diet as tolerated  3) Continue to Monitor weights, labs, BM's, skin integrity, p.o. intake.   4) RD to f/u prn.

## 2022-06-02 NOTE — DISCHARGE NOTE PROVIDER - HOSPITAL COURSE
45 y/o Female presents to [facility] for elective surgery for malignant neoplasm of pancreas s/p chemotherapy and radiation treatment. She underwent diagnostic laparoscopy, distal pancreatectomy, splenectomy, and left adrenalectomy on 5/27/22. Her stover catheter was removed on 5/28 and she voided spontaneously. She had return of bowel function and her NGT was removed on 06/01. Her diet was slowly advanced as tolerated. She was hemodynamically stable, ambulating independently, tolerating regular diet with return of bowel function, and was subsequently discharged home. She received her pneumococcal vaccination prior to discharge.   45 y/o Female presents to [facility] for elective surgery for malignant neoplasm of pancreas s/p chemotherapy and radiation treatment. She underwent diagnostic laparoscopy, distal pancreatectomy, splenectomy, and left adrenalectomy on 5/27/22. Her stover catheter was removed on 5/28 and she voided spontaneously. She had return of bowel function and her NGT was removed on 06/01. Her diet was slowly advanced as tolerated. She was hemodynamically stable, ambulating independently, tolerating regular diet with return of bowel function, and was subsequently discharged home.

## 2022-06-02 NOTE — DISCHARGE NOTE PROVIDER - CARE PROVIDER_API CALL
Juni Howell)  Complex General Surgical Oncology; Surgery; Surgical Oncology  [address] [place] [address]  Phone: [phone]  Fax: [phone]  Follow Up Time:

## 2022-06-02 NOTE — PROVIDER CONTACT NOTE (OTHER) - BACKGROUND
Refill request  LOV 11/29/18 Cognitive Impairment  NOV 6/6/19  Refilled per protocol  donepezil (ARICEPT) 5 MG tablet 60 tablet 5 11/29/2018     Sig: Take 1 tab PO QHS for 1 month, then 2 tabs PO QHS pt admitted s/p hannah 5/27

## 2022-06-02 NOTE — DIETITIAN INITIAL EVALUATION ADULT - NSFNSGIIOFT_GEN_A_CORE
06-01-22 @ 07:01  -  06-02-22 @ 07:00  --------------------------------------------------------  OUT:    Nasogastric/Oral tube (mL): 30 mL  Total OUT: 30 mL    Total NET: -30 mL

## 2022-06-03 ENCOUNTER — TRANSCRIPTION ENCOUNTER (OUTPATIENT)
Age: 47
End: 2022-06-03

## 2022-06-03 VITALS
TEMPERATURE: 98 F | OXYGEN SATURATION: 96 % | RESPIRATION RATE: 18 BRPM | HEART RATE: 85 BPM | DIASTOLIC BLOOD PRESSURE: 79 MMHG | SYSTOLIC BLOOD PRESSURE: 117 MMHG

## 2022-06-03 LAB
ALBUMIN SERPL ELPH-MCNC: 3.6 G/DL — SIGNIFICANT CHANGE UP (ref 3.3–5)
ALP SERPL-CCNC: 124 U/L — HIGH (ref 40–120)
ALT FLD-CCNC: 31 U/L — SIGNIFICANT CHANGE UP (ref 4–33)
ANION GAP SERPL CALC-SCNC: 11 MMOL/L — SIGNIFICANT CHANGE UP (ref 7–14)
AST SERPL-CCNC: 32 U/L — SIGNIFICANT CHANGE UP (ref 4–32)
BILIRUB SERPL-MCNC: 0.6 MG/DL — SIGNIFICANT CHANGE UP (ref 0.2–1.2)
BUN SERPL-MCNC: 4 MG/DL — LOW (ref 7–23)
CALCIUM SERPL-MCNC: 9.2 MG/DL — SIGNIFICANT CHANGE UP (ref 8.4–10.5)
CHLORIDE SERPL-SCNC: 104 MMOL/L — SIGNIFICANT CHANGE UP (ref 98–107)
CO2 SERPL-SCNC: 24 MMOL/L — SIGNIFICANT CHANGE UP (ref 22–31)
CREAT SERPL-MCNC: 0.63 MG/DL — SIGNIFICANT CHANGE UP (ref 0.5–1.3)
EGFR: 112 ML/MIN/1.73M2 — SIGNIFICANT CHANGE UP
GLUCOSE SERPL-MCNC: 98 MG/DL — SIGNIFICANT CHANGE UP (ref 70–99)
HCT VFR BLD CALC: 37.3 % — SIGNIFICANT CHANGE UP (ref 34.5–45)
HGB BLD-MCNC: 12.2 G/DL — SIGNIFICANT CHANGE UP (ref 11.5–15.5)
MAGNESIUM SERPL-MCNC: 2 MG/DL — SIGNIFICANT CHANGE UP (ref 1.6–2.6)
MCHC RBC-ENTMCNC: 30.1 PG — SIGNIFICANT CHANGE UP (ref 27–34)
MCHC RBC-ENTMCNC: 32.7 GM/DL — SIGNIFICANT CHANGE UP (ref 32–36)
MCV RBC AUTO: 92.1 FL — SIGNIFICANT CHANGE UP (ref 80–100)
NRBC # BLD: 0 /100 WBCS — SIGNIFICANT CHANGE UP
NRBC # FLD: 0 K/UL — SIGNIFICANT CHANGE UP
PHOSPHATE SERPL-MCNC: 3.8 MG/DL — SIGNIFICANT CHANGE UP (ref 2.5–4.5)
PLATELET # BLD AUTO: 537 K/UL — HIGH (ref 150–400)
POTASSIUM SERPL-MCNC: 3.7 MMOL/L — SIGNIFICANT CHANGE UP (ref 3.5–5.3)
POTASSIUM SERPL-SCNC: 3.7 MMOL/L — SIGNIFICANT CHANGE UP (ref 3.5–5.3)
PROT SERPL-MCNC: 6.7 G/DL — SIGNIFICANT CHANGE UP (ref 6–8.3)
RBC # BLD: 4.05 M/UL — SIGNIFICANT CHANGE UP (ref 3.8–5.2)
RBC # FLD: 14.6 % — HIGH (ref 10.3–14.5)
SODIUM SERPL-SCNC: 139 MMOL/L — SIGNIFICANT CHANGE UP (ref 135–145)
WBC # BLD: 9.36 K/UL — SIGNIFICANT CHANGE UP (ref 3.8–10.5)
WBC # FLD AUTO: 9.36 K/UL — SIGNIFICANT CHANGE UP (ref 3.8–10.5)

## 2022-06-03 RX ORDER — LIDOCAINE 4 G/100G
1 CREAM TOPICAL
Qty: 5 | Refills: 0
Start: 2022-06-03

## 2022-06-03 RX ORDER — PNEUMOCOCCAL 13-VALENT CONJUGATE VACCINE 2.2; 2.2; 2.2; 2.2; 2.2; 4.4; 2.2; 2.2; 2.2; 2.2; 2.2; 2.2; 2.2 UG/.5ML; UG/.5ML; UG/.5ML; UG/.5ML; UG/.5ML; UG/.5ML; UG/.5ML; UG/.5ML; UG/.5ML; UG/.5ML; UG/.5ML; UG/.5ML; UG/.5ML
0.5 INJECTION, SUSPENSION INTRAMUSCULAR ONCE
Refills: 0 | Status: DISCONTINUED | OUTPATIENT
Start: 2022-06-03 | End: 2022-06-03

## 2022-06-03 RX ORDER — OXYCODONE HYDROCHLORIDE 5 MG/1
1 TABLET ORAL
Qty: 8 | Refills: 0
Start: 2022-06-03

## 2022-06-03 RX ADMIN — PANTOPRAZOLE SODIUM 40 MILLIGRAM(S): 20 TABLET, DELAYED RELEASE ORAL at 05:55

## 2022-06-03 RX ADMIN — Medication 975 MILLIGRAM(S): at 06:41

## 2022-06-03 RX ADMIN — Medication 975 MILLIGRAM(S): at 05:55

## 2022-06-03 RX ADMIN — Medication 137 MICROGRAM(S): at 05:56

## 2022-06-03 RX ADMIN — LIDOCAINE 2 PATCH: 4 CREAM TOPICAL at 08:02

## 2022-06-03 RX ADMIN — Medication 10 MILLIGRAM(S): at 05:56

## 2022-06-03 RX ADMIN — LIDOCAINE 2 PATCH: 4 CREAM TOPICAL at 06:41

## 2022-06-03 NOTE — DISCHARGE NOTE NURSING/CASE MANAGEMENT/SOCIAL WORK - NSDCPEFALRISK_GEN_ALL_CORE
For information on Fall & Injury Prevention, visit: [email or web address] OR  [email or web address] OR  https://www.cdc.gov/steadi/patient.html

## 2022-06-03 NOTE — PROGRESS NOTE ADULT - ASSESSMENT
46F s/p  distal panc, splenectomy, left adrenalectomy 5/27.    PLAN  - Diet: NPO, NGT  - NGT clamp trial 4 times today  - Pain control  - Hydrocortisone, appreciate Endo recs  - d/c'ed prevena  - DVT ppx  - OOB/incentive spirometry     D Team Surgery   #[id number] 46F s/p  distal panc, splenectomy, left adrenalectomy 5/27.    PLAN  - Diet: LFD  - Pain control  - d/c'ed prevena  - DVT ppx  - OOB/incentive spirometry   - dispo: home today    D Team Surgery   #[id number]

## 2022-06-03 NOTE — DISCHARGE NOTE NURSING/CASE MANAGEMENT/SOCIAL WORK - PATIENT PORTAL LINK FT
You can access the FollowMyHealth Patient Portal offered by [facility] by registering at the following website: [email or web address]. By joining [facility]’s FollowMyHealth portal, you will also be able to view your health information using other applications (apps) compatible with our system.

## 2022-06-03 NOTE — PROGRESS NOTE ADULT - SUBJECTIVE AND OBJECTIVE BOX
Subjective:   Patient seen at bedside this AM. Reports feeling well, without complaints. Denies chest pain, SOB. Tolerating diet without N/V.     24h Events:   - Overnight, no acute events    Objective:  Vital Signs  T(C): 36.8 (06-02 @ 23:54), Max: 37.2 (06-02 @ 16:51)  HR: 70 (06-02 @ 23:54) (70 - 87)  BP: 116/79 (06-02 @ 23:54) (113/77 - 124/70)  RR: 18 (06-02 @ 23:54) (18 - 18)  SpO2: 99% (06-02 @ 23:54) (96% - 100%)  06-01-22 @ 07:01  -  06-02-22 @ 07:00  --------------------------------------------------------  IN:  Total IN: 0 mL    OUT:    Nasogastric/Oral tube (mL): 30 mL    Voided (mL): 1400 mL  Total OUT: 1430 mL    Total NET: -1430 mL      06-02-22 @ 07:01  -  06-03-22 @ 02:02  --------------------------------------------------------  IN:  Total IN: 0 mL    OUT:    Voided (mL): 2850 mL  Total OUT: 2850 mL    Total NET: -2850 mL        PHYSICAL EXAM:    GENERAL: NAD, lying in bed comfortably  Abdomen: Soft, non distended. No pain on palpation. NGT in place, output bilious, prevena out today.   HEENT: NGT      Labs:                        13.8   6.89  )-----------( 420      ( 01 Jun 2022 06:37 )             42.1   06-01    139  |  103  |  5<L>  ----------------------------<  89  3.5   |  25  |  0.58    Ca    9.2      01 Jun 2022 06:37  Phos  3.8     06-01  Mg     2.00     06-01    TPro  6.9  /  Alb  3.4  /  TBili  0.7  /  DBili  x   /  AST  41<H>  /  ALT  31  /  AlkPhos  130<H>  06-01    CAPILLARY BLOOD GLUCOSE          Medications:   MEDICATIONS  (STANDING):  acetaminophen     Tablet .. 975 milliGRAM(s) Oral every 6 hours  enoxaparin Injectable 40 milliGRAM(s) SubCutaneous every 24 hours  escitalopram 15 milliGRAM(s) Oral daily  ketorolac   Injectable 15 milliGRAM(s) IV Push every 6 hours  levothyroxine 137 MICROGram(s) Oral daily  lidocaine   4% Patch 2 Patch Transdermal daily  metoclopramide 10 milliGRAM(s) Oral every 8 hours  pantoprazole    Tablet 40 milliGRAM(s) Oral before breakfast  pneumococcal  13 Vaccine (PREVNAR 13) 0.5 milliLiter(s) IntraMuscular once    MEDICATIONS  (PRN):  diphenhydrAMINE 25 milliGRAM(s) Oral every 8 hours PRN Rash and/or Itching  naloxone Injectable 0.1 milliGRAM(s) IV Push every 3 minutes PRN For ANY of the following changes in patient status:  A. RR LESS THAN 10 breaths per minute, B. Oxygen saturation LESS THAN 90%, C. Sedation score of 6  ondansetron   Disintegrating Tablet 4 milliGRAM(s) Oral every 8 hours PRN Nausea and/or Vomiting  oxyCODONE    IR 5 milliGRAM(s) Oral every 4 hours PRN Moderate Pain (4 - 6)  oxyCODONE    IR 10 milliGRAM(s) Oral every 4 hours PRN Severe Pain (7 - 10)      Imaging:     Subjective:   Patient seen at bedside this AM. Reports feeling well, without complaints. Denies chest pain, SOB. Tolerating diet without N/V.     24h Events:   - Overnight, no acute events    Objective:  Vital Signs  T(C): 36.8 (06-02 @ 23:54), Max: 37.2 (06-02 @ 16:51)  HR: 70 (06-02 @ 23:54) (70 - 87)  BP: 116/79 (06-02 @ 23:54) (113/77 - 124/70)  RR: 18 (06-02 @ 23:54) (18 - 18)  SpO2: 99% (06-02 @ 23:54) (96% - 100%)  06-01-22 @ 07:01  -  06-02-22 @ 07:00  --------------------------------------------------------  IN:  Total IN: 0 mL    OUT:    Nasogastric/Oral tube (mL): 30 mL    Voided (mL): 1400 mL  Total OUT: 1430 mL    Total NET: -1430 mL      06-02-22 @ 07:01  -  06-03-22 @ 02:02  --------------------------------------------------------  IN:  Total IN: 0 mL    OUT:    Voided (mL): 2850 mL  Total OUT: 2850 mL    Total NET: -2850 mL        PHYSICAL EXAM:    GENERAL: NAD, lying in bed comfortably  Abdomen: Soft, non distended. No pain on palpation.         Labs:                        13.8   6.89  )-----------( 420      ( 01 Jun 2022 06:37 )             42.1   06-01    139  |  103  |  5<L>  ----------------------------<  89  3.5   |  25  |  0.58    Ca    9.2      01 Jun 2022 06:37  Phos  3.8     06-01  Mg     2.00     06-01    TPro  6.9  /  Alb  3.4  /  TBili  0.7  /  DBili  x   /  AST  41<H>  /  ALT  31  /  AlkPhos  130<H>  06-01    CAPILLARY BLOOD GLUCOSE          Medications:   MEDICATIONS  (STANDING):  acetaminophen     Tablet .. 975 milliGRAM(s) Oral every 6 hours  enoxaparin Injectable 40 milliGRAM(s) SubCutaneous every 24 hours  escitalopram 15 milliGRAM(s) Oral daily  ketorolac   Injectable 15 milliGRAM(s) IV Push every 6 hours  levothyroxine 137 MICROGram(s) Oral daily  lidocaine   4% Patch 2 Patch Transdermal daily  metoclopramide 10 milliGRAM(s) Oral every 8 hours  pantoprazole    Tablet 40 milliGRAM(s) Oral before breakfast  pneumococcal  13 Vaccine (PREVNAR 13) 0.5 milliLiter(s) IntraMuscular once    MEDICATIONS  (PRN):  diphenhydrAMINE 25 milliGRAM(s) Oral every 8 hours PRN Rash and/or Itching  naloxone Injectable 0.1 milliGRAM(s) IV Push every 3 minutes PRN For ANY of the following changes in patient status:  A. RR LESS THAN 10 breaths per minute, B. Oxygen saturation LESS THAN 90%, C. Sedation score of 6  ondansetron   Disintegrating Tablet 4 milliGRAM(s) Oral every 8 hours PRN Nausea and/or Vomiting  oxyCODONE    IR 5 milliGRAM(s) Oral every 4 hours PRN Moderate Pain (4 - 6)  oxyCODONE    IR 10 milliGRAM(s) Oral every 4 hours PRN Severe Pain (7 - 10)      Imaging:

## 2022-06-04 ENCOUNTER — OUTPATIENT (OUTPATIENT)
Dept: OUTPATIENT SERVICES | Facility: HOSPITAL | Age: 47
LOS: 1 days | Discharge: HOME | End: 2022-06-04

## 2022-06-04 DIAGNOSIS — Z98.890 OTHER SPECIFIED POSTPROCEDURAL STATES: Chronic | ICD-10-CM

## 2022-06-04 DIAGNOSIS — K52.1 TOXIC GASTROENTERITIS AND COLITIS: ICD-10-CM

## 2022-06-04 DIAGNOSIS — Z45.2 ENCOUNTER FOR ADJUSTMENT AND MANAGEMENT OF VASCULAR ACCESS DEVICE: Chronic | ICD-10-CM

## 2022-06-04 DIAGNOSIS — G62.0 DRUG-INDUCED POLYNEUROPATHY: ICD-10-CM

## 2022-06-04 DIAGNOSIS — D70.1 AGRANULOCYTOSIS SECONDARY TO CANCER CHEMOTHERAPY: ICD-10-CM

## 2022-06-04 DIAGNOSIS — Z79.899 OTHER LONG TERM (CURRENT) DRUG THERAPY: ICD-10-CM

## 2022-06-04 DIAGNOSIS — C25.9 MALIGNANT NEOPLASM OF PANCREAS, UNSPECIFIED: ICD-10-CM

## 2022-06-04 PROBLEM — Z15.01 GENETIC SUSCEPTIBILITY TO MALIGNANT NEOPLASM OF BREAST: Chronic | Status: ACTIVE | Noted: 2022-05-13

## 2022-06-04 PROBLEM — U07.1 COVID-19: Chronic | Status: ACTIVE | Noted: 2022-05-13

## 2022-06-04 PROBLEM — F41.9 ANXIETY DISORDER, UNSPECIFIED: Chronic | Status: ACTIVE | Noted: 2022-05-13

## 2022-06-08 LAB — SURGICAL PATHOLOGY STUDY: SIGNIFICANT CHANGE UP

## 2022-06-10 ENCOUNTER — APPOINTMENT (OUTPATIENT)
Dept: INFUSION THERAPY | Facility: CLINIC | Age: 47
End: 2022-06-10

## 2022-06-10 ENCOUNTER — APPOINTMENT (OUTPATIENT)
Dept: HEMATOLOGY ONCOLOGY | Facility: CLINIC | Age: 47
End: 2022-06-10
Payer: COMMERCIAL

## 2022-06-10 VITALS
SYSTOLIC BLOOD PRESSURE: 101 MMHG | HEART RATE: 86 BPM | DIASTOLIC BLOOD PRESSURE: 68 MMHG | TEMPERATURE: 98.7 F | RESPIRATION RATE: 18 BRPM | WEIGHT: 157 LBS | OXYGEN SATURATION: 97 % | BODY MASS INDEX: 26.95 KG/M2

## 2022-06-10 DIAGNOSIS — Z79.899 OTHER LONG TERM (CURRENT) DRUG THERAPY: ICD-10-CM

## 2022-06-10 DIAGNOSIS — Z78.9 OTHER LONG TERM (CURRENT) DRUG THERAPY: ICD-10-CM

## 2022-06-10 PROCEDURE — 99215 OFFICE O/P EST HI 40 MIN: CPT

## 2022-06-10 NOTE — REVIEW OF SYSTEMS
[Patient Intake Form Reviewed] : Patient intake form was reviewed [Fatigue] : fatigue [Negative] : Allergic/Immunologic [FreeTextEntry7] : per HPI  [de-identified] : per HPI

## 2022-06-10 NOTE — ASSESSMENT
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 \par \par S/p 8 cycles of neodjuvant FOLFIRINOX. \par \par S/p  neoadjuvant RT concurrent capecitabine to  further improve resectability.\par \par  5/27/22 distal pancreatectomy, splenectomy L adrenalectomy , LN dissection. Complete pathologic response.\par \par Recovering from surgery. \par \par Discussed prognosis, future monitoring. \par \par She will need to complete adjuvant chemotherapy - needs FOLFIRINOX x 4 more cycles for a total of 12 cycles of cristino ( pre and post) operative chemotherapy.\par \par Plan to start chemotherapy in ~ 6 weeks post surgery- if OK from surgical point.\par \par

## 2022-06-10 NOTE — HISTORY OF PRESENT ILLNESS
[Disease: _____________________] : Disease: [unfilled] [T: ___] : T[unfilled] [N: ___] : N[unfilled] [AJCC Stage: ____] : AJCC Stage: [unfilled] [de-identified] : BOUCHRA PALACIOS is a 45 y.o. with a PMH significant for hypothyroidism, in Oct 2021 diagnosed with  a clinical stage III pancreatic cancer.\par \par ~8/2021 - Presented at age 45 with abdominal pains.  Saw PCP, referred to GI Dr. Maldonado. \par 9/30/21 - Abd US - 3.9 x 4.6 x 4.8cm heterogeneous mass in region of pancreatic tail. \par 10/7/21 - CT A/P - pancreatic tail mass with splenic artery encasement and splenic vein thrombosis ( tumor thrombus).  Borderline PALN's. \par 10/11/21 - 10/15/21 - Admit to  for severe pain.  Was set up for outpatient EGD with biopsy but had severe pains, nausea, dry-heaving and so went to ER. \par 10/11/21 - CT Chest - no evidence of disease.\par 10/11/21 - MRCP - tumor encases splenic artery, celiac artery contact <180, splenic vein occlusion.  Possible retroperitoneal extension with left adrenal gland contact and possible extension into left celiac ganglion.\par 10/12/21 - EUS, FNA pancreatic mass - scanty specimen - PATH - poorly differentiated carcinoma. \par 10/14/21 - Celiac block attempted - not successful - no window due to tumor overlying plexus. \par Case presented at tumor board - for NEOAdjuvant FOLFIRINOX up to 12 cycles, reassess after 4.  Consider chemoRT if suboptimal response. \par \par \par FOLFIRINOX x 8 10/19/21 - 2/15/21 \par \par CT CAP 2/15/22 ( post 8 cycles of  Folfirinox) further decrease in pancreatic mass, no new areas of disease.\par \par Neoadjuvant chemoRT to  further improve resectability .\par \par Completed dose  5000 cGy to pancreas  on 4/18/22 ( Dr Tobar) Concurrent capecitabine- stopped due to worsening neuropathies \par \par CT AP 5/12/22  infiltrative pancreatic tail mass minimally enlarged with occlusion of splenic artery and partial vascular encasement of celiac axis and left hepatic artery\par \par 5/27/22 Distal pancreatectomy, splenectomy, left adrenalectomy  : complete response, no residual cancer. 29 lymph nodes negative .  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 Tolerating frequent small meals. Still with abd discomfort but slowly improving.\par Neuropathy - fingertips, toes- stable. \par

## 2022-06-10 NOTE — PHYSICAL EXAM
[Restricted in physically strenuous activity but ambulatory and able to carry out work of a light or sedentary nature] : Status 1- Restricted in physically strenuous activity but ambulatory and able to carry out work of a light or sedentary nature, e.g., light house work, office work [Normal] : no peripheral adenopathy appreciated [de-identified] : looks OK  [de-identified] : mediport  [de-identified] :  midline surgical incision healing well, abd soft

## 2022-06-15 ENCOUNTER — APPOINTMENT (OUTPATIENT)
Dept: SURGICAL ONCOLOGY | Facility: CLINIC | Age: 47
End: 2022-06-15
Payer: COMMERCIAL

## 2022-06-15 VITALS
HEART RATE: 76 BPM | OXYGEN SATURATION: 97 % | HEIGHT: 64 IN | SYSTOLIC BLOOD PRESSURE: 118 MMHG | BODY MASS INDEX: 26.46 KG/M2 | DIASTOLIC BLOOD PRESSURE: 81 MMHG | TEMPERATURE: 98 F | RESPIRATION RATE: 16 BRPM | WEIGHT: 155 LBS

## 2022-06-15 PROCEDURE — 99024 POSTOP FOLLOW-UP VISIT: CPT

## 2022-06-23 NOTE — HISTORY OF PRESENT ILLNESS
[de-identified] : Ms. BOUCHRA PALACIOS  is a 46 year  old female  presenting for a post op visit s/p diagnostic lap, distal pancreatectomy, splenectomy, left adrenalectomy, . \par \par HOSPITAL COURSE @ [facility] 10/11/2021-10/15/2021: \par 45 year old female with a PMHx of hypothyroidism and depression presented to the [facility] ED on 10/11 for abdominal pain that had lasted for a month. Prior to presentation patient had been worked up by her gastroenterologist outpatient who had ordered an abdominal ultrasound. Ultrasound at that time showed a pancreatic mass. Subsequent CT abd/pelvis showed a pancreatic tail mass with splenic encasement. Patient was scheduled for a EGD with biopsy with Dr. Campbell however patient was in acute distress and presented to ED on 10/10. Gastroenterology was consulted and patient was seen by Dr. Campbell who performed a EUS FNB. 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 Hem/Onc was consulted and patient was evaluated with Dr. Mendez who did not recommend anti-coagulation because the thrombosis was a direct result of the tumor encasement. Patient was also seen by Dr. Howell Surgical Oncology who plans to resect the mass. Patient case was presented at a GI Tumour board and a 12 cycle course of Folfirinox prior to surgical resection was planned. CXR and CT Abdomen indicated no metastatic disease to the lungs or liver. During hospitalization a nerve block of the celiac plexus was attempted by Adrian but was unsuccessful due to risk of puncturing and seeding the pancreatic mass. Patient's pain was controlled via IV Dilaudid and a Fentanyl patch. Patient \par will continue to take PO Dilaudid and apply the fentanyl patch. Patient also underwent a procedure with Interventional Radiology to implant a chemotherapy port. Patient will see Dr. Mendez outpatient on 10/18/21 and will begin chemotherapy 10/19/21. \par \par Pancreas tail biopsy 10/11/2021- PATH: Poorly differentiated carcinoma\par \par Pts paternal aunt underwent genetic testing and was found to be BRCA 2+.  Pt. then decided to pursue genetic Testing in 2021 which revealed she is a BRCA 2+ gene carrier.  She met with Dr. Vera (Genetic counselor) and will undergo more extensive genetic testing. Results pending. \par \par PMH: Depression, hypothyroidism, BRCA 2+, \par PSH:  EGD/EUS\par Social Hx: Never a smoker, social alcohol use\par \par INTERVAL HISTORY:\par 10/21/2021- Pt. was started on neoadjuvant chemotherapy with FOLFIRINOX on 10/19/2021.  Having nausea and diarrhea.  Also pain in back with poor pain control with narcotic medication.\par \par 21 Ca 19-9 28 U/mL\par \par CT C/A/P performed on 21 revealed Pancreatic tail mass decreased in size since 10/11/2021. Apparent new occlusion splenic artery with extensive splenic infarction. Encasement left gastric artery and celiac axis. Splenic vein occlusion.\par \par 21- Pt cont. on Folfirinox. Today she is feeling pretty well.  She has had some tough symptoms which have been managed appropriately.  She is overall holding up well.\par \par 2022- Pt. testing positive for COVID in 2021. On chemo under the care of Dr. Mendez.  Re-staging CT C/A/P performed on 2/15/2022 showed further decrease in size of the pancreatic tail carcinoma as described with focal abutment less than 180 degrees of proximal celiac artery and proximal common hepatic artery by ill-defined peripancreatic low-attenuation which could represent neurovascular extent of disease versus pancreatitis or treatment-related changes. No venous encasement involving superior mesenteric vein or main portal vein. Similar extent of infiltrative low-attenuation which abuts the left adrenal gland and left celiac ganglion. Acute colitis involving the ascending transverse and descending colon.  No imaging evidence of metastatic disease involving the chest, abdomen or pelvis. \par \par 3/14/2022- Started XRT x 5 weeks with Xeloda (nadeem/ Lourdes Tobar)\par \par 2022- Xeloda was stopped due to neuropathies\par \par 22- XRT completed \par \par 5/10/22- Received 3 vaccines in preparation for splenectomy (Bexsero, Menactra, Hib)\par \par 2022- CT A/P - infiltrative pancreatic tail mass minimally enlarged since 2/15/22 with occlusion of splenic artery and vein and partial encasement celiac axis and left hepatic artery as previously demonstrated.  Size: Approximately 2.7 x 1.7 cm, previously 2.3 x 1.6 cm. \par \par 22- Here to discuss recent CT.  Scheduled for distal pancreatectomy and splenectomy on 22. \par \par ***SURGERY: Diagnostic, laparoscopy, distal pancreatectomy, splenectomy, left adrenalectomy. No metastases on diagnostic lap.\par *** PATHOLOGY:\par Pancreas, spleen and left adrenal gland, distal pancreatectomy,\par splenectomy, and left adrenalectomy\par - Negative for residual carcinoma (Complete response, score 0)\par - Twenty-nine lymph nodes negative for tumor (0/29)\par - Pathologic stage (AJCC 8th Ed.) ypT0N0\par \par 6/15/2022: No post op complications. Incision healing without evidence of infection. Denies fever or chills. No complaints of abdominal pain, nausea, vomiting, or bowel habits. Adequate appetite. Pt has seen Med/Onc - MD Mendez on 6/10- she is to complete 4 more rounds of adjuvant chemo (FOLFIRINOX) and will restart in 6 weeks post surgery. \par

## 2022-06-23 NOTE — CONSULT LETTER
[Dear  ___] : Dear  [unfilled], [Courtesy Letter:] : I had the pleasure of seeing your patient, [unfilled], in my office today. [Consult Closing:] : Thank you very much for allowing me to participate in the care of this patient.  If you have any questions, please do not hesitate to contact me. [Sincerely,] : Sincerely, [FreeTextEntry3] : Juni Howell MD, MPH, FACS, FSSO\par , [facility] Surgical Oncology [facility]\par [facility]\par Associate Professor of Surgery\par [employer] [facility]  [DrVidhi  ___] : Dr. MENDEZ [DrVidhi ___] : Dr. MENDEZ

## 2022-06-23 NOTE — ASSESSMENT
[FreeTextEntry1] : Ms. BOUCHRA PALACIOS  is a 46 year old female with PMHx hypothyroid, depression and BRCA2+, recently diagnosed with clinical stage III pancreatic tail cancer. Pt. was started on neoadjuvant FOLFIRINOX on 10/19/2021.  Pt. will continue for up to 12 cycles, restage after 4th cycle. \par \par CT C/A/P performed on 12/13/21 revealed Pancreatic tail mass decreased in size since 10/11/2021. Apparent new occlusion splenic artery with extensive splenic infarction. Encasement left gastric artery and celiac axis. Splenic vein occlusion.\par \par -Pt. testing positive for COVID in 12/2021. \par -On chemo under the care of Dr. Mendez.  \par -Re-staging CT C/A/P performed on 2/15/2022 showed further decrease in size of the pancreatic tail carcinoma as described with focal abutment less than 180 degrees of proximal celiac artery and proximal common hepatic artery by ill-defined peripancreatic low-attenuation which could represent neurovascular extent of disease versus pancreatitis or treatment-related changes. No venous encasement involving superior mesenteric vein or main portal vein. Similar extent of infiltrative low-attenuation which abuts the left adrenal gland and left celiac ganglion. Acute colitis involving the ascending transverse and descending colon.  No imaging evidence of metastatic disease involving the chest, abdomen or pelvis. \par \par 3/14/2022- Started XRT x 5 weeks with Xeloda (w/ Lourdes Tobar)\par 4/12/2022- Xeloda was stopped due to neuropathies\par 4/18/22- XRT completed \par 5/12/2022- CT A/P - infiltrative pancreatic tail mass minimally enlarged since 2/15/22 with occlusion of splenic artery and vein and partial encasement celiac axis and left hepatic artery as previously demonstrated.  Size: Approximately 2.7 x 1.7 cm, previously 2.3 x 1.6 cm. \par \par ***SURGERY: Diagnostic, laparoscopy, distal pancreatectomy, splenectomy, left adrenalectomy. No metastases on diagnostic lap.\par *** PATHOLOGY:\par Pancreas, spleen and left adrenal gland, distal pancreatectomy,\par splenectomy, and left adrenalectomy\par - Negative for residual carcinoma (Complete response, score 0)\par - Twenty-nine lymph nodes negative for tumor (0/29)\par - Pathologic stage (AJCC 8th Ed.) ypT0N0\par \par 6/15/2022: No post op complications. Incision healing without evidence of infection. Denies fever or chills. No complaints of abdominal pain, nausea, vomiting, or bowel habits. Adequate appetite. \par \par PLAN:\par 1) RTO 1 month\par 2) F/u w/ med/onc to complete 4 more cycles of adjuvant therapy- will continue again at 6 weeks post op \par 3) Repeat imaging after completing chemotherapy

## 2022-06-23 NOTE — REASON FOR VISIT
[Post-Op] : a post-op for [FreeTextEntry2] : s/p diagnostic lap, distal pancreatectomy, splenectomy, left adrenalectomy

## 2022-06-23 NOTE — PHYSICAL EXAM
[Normal] : supple, no neck mass and thyroid not enlarged [Normal Neck Lymph Nodes] : normal neck lymph nodes  [Normal Supraclavicular Lymph Nodes] : normal supraclavicular lymph nodes [Normal Axillary Lymph Nodes] : normal axillary lymph nodes [Normal] : oriented to person, place and time, with appropriate affect [de-identified] : soft NT ND, healing incision sites without evidence of infection [de-identified] : healing incision sites w/o evidence of infection

## 2022-07-07 ENCOUNTER — APPOINTMENT (OUTPATIENT)
Dept: SURGICAL ONCOLOGY | Facility: CLINIC | Age: 47
End: 2022-07-07

## 2022-07-07 VITALS
RESPIRATION RATE: 15 BRPM | BODY MASS INDEX: 26.46 KG/M2 | HEART RATE: 68 BPM | TEMPERATURE: 98.5 F | SYSTOLIC BLOOD PRESSURE: 115 MMHG | HEIGHT: 64 IN | OXYGEN SATURATION: 95 % | WEIGHT: 155 LBS | DIASTOLIC BLOOD PRESSURE: 76 MMHG

## 2022-07-07 PROCEDURE — 99024 POSTOP FOLLOW-UP VISIT: CPT

## 2022-07-12 ENCOUNTER — APPOINTMENT (OUTPATIENT)
Dept: INFUSION THERAPY | Facility: CLINIC | Age: 47
End: 2022-07-12

## 2022-07-12 ENCOUNTER — RESULT REVIEW (OUTPATIENT)
Age: 47
End: 2022-07-12

## 2022-07-12 ENCOUNTER — APPOINTMENT (OUTPATIENT)
Dept: HEMATOLOGY ONCOLOGY | Facility: CLINIC | Age: 47
End: 2022-07-12

## 2022-07-12 VITALS
TEMPERATURE: 98.3 F | RESPIRATION RATE: 16 BRPM | DIASTOLIC BLOOD PRESSURE: 67 MMHG | SYSTOLIC BLOOD PRESSURE: 106 MMHG | OXYGEN SATURATION: 95 % | BODY MASS INDEX: 27.37 KG/M2 | WEIGHT: 159.44 LBS | HEART RATE: 83 BPM

## 2022-07-12 LAB
ALBUMIN SERPL ELPH-MCNC: 4.1 G/DL — SIGNIFICANT CHANGE UP (ref 3.3–5)
ALP SERPL-CCNC: 116 U/L — SIGNIFICANT CHANGE UP (ref 40–120)
ALT FLD-CCNC: 20 U/L — SIGNIFICANT CHANGE UP (ref 10–45)
ANION GAP SERPL CALC-SCNC: 12 MMOL/L — SIGNIFICANT CHANGE UP (ref 5–17)
AST SERPL-CCNC: 22 U/L — SIGNIFICANT CHANGE UP (ref 10–40)
BASOPHILS # BLD AUTO: 0.06 K/UL — SIGNIFICANT CHANGE UP (ref 0–0.2)
BASOPHILS NFR BLD AUTO: 0.6 % — SIGNIFICANT CHANGE UP (ref 0–2)
BILIRUB SERPL-MCNC: 1.1 MG/DL — SIGNIFICANT CHANGE UP (ref 0.2–1.2)
BUN SERPL-MCNC: 20 MG/DL — SIGNIFICANT CHANGE UP (ref 7–23)
CALCIUM SERPL-MCNC: 9.2 MG/DL — SIGNIFICANT CHANGE UP (ref 8.4–10.5)
CANCER AG19-9 SERPL-ACNC: 10 U/ML — SIGNIFICANT CHANGE UP
CHLORIDE SERPL-SCNC: 102 MMOL/L — SIGNIFICANT CHANGE UP (ref 96–108)
CO2 SERPL-SCNC: 24 MMOL/L — SIGNIFICANT CHANGE UP (ref 22–31)
CREAT SERPL-MCNC: 0.68 MG/DL — SIGNIFICANT CHANGE UP (ref 0.5–1.3)
EGFR: 110 ML/MIN/1.73M2 — SIGNIFICANT CHANGE UP
EOSINOPHIL # BLD AUTO: 0.5 K/UL — SIGNIFICANT CHANGE UP (ref 0–0.5)
EOSINOPHIL NFR BLD AUTO: 4.7 % — SIGNIFICANT CHANGE UP (ref 0–6)
ERYTHROCYTE [SEDIMENTATION RATE] IN BLOOD: 22 MM/HR — HIGH (ref 0–15)
FERRITIN SERPL-MCNC: 144 NG/ML — SIGNIFICANT CHANGE UP (ref 15–150)
FOLATE SERPL-MCNC: 7.7 NG/ML — SIGNIFICANT CHANGE UP
GLUCOSE SERPL-MCNC: 88 MG/DL — SIGNIFICANT CHANGE UP (ref 70–99)
HCT VFR BLD CALC: 39.2 % — SIGNIFICANT CHANGE UP (ref 34.5–45)
HGB BLD-MCNC: 13 G/DL — SIGNIFICANT CHANGE UP (ref 11.5–15.5)
IMM GRANULOCYTES NFR BLD AUTO: 0.4 % — SIGNIFICANT CHANGE UP (ref 0–1.5)
IRON SATN MFR SERPL: 35 % — SIGNIFICANT CHANGE UP (ref 14–50)
IRON SATN MFR SERPL: 95 UG/DL — SIGNIFICANT CHANGE UP (ref 30–160)
LYMPHOCYTES # BLD AUTO: 1.93 K/UL — SIGNIFICANT CHANGE UP (ref 1–3.3)
LYMPHOCYTES # BLD AUTO: 18.3 % — SIGNIFICANT CHANGE UP (ref 13–44)
MCHC RBC-ENTMCNC: 30.4 PG — SIGNIFICANT CHANGE UP (ref 27–34)
MCHC RBC-ENTMCNC: 33.2 GM/DL — SIGNIFICANT CHANGE UP (ref 32–36)
MCV RBC AUTO: 91.6 FL — SIGNIFICANT CHANGE UP (ref 80–100)
MONOCYTES # BLD AUTO: 1.06 K/UL — HIGH (ref 0–0.9)
MONOCYTES NFR BLD AUTO: 10 % — SIGNIFICANT CHANGE UP (ref 2–14)
NEUTROPHILS # BLD AUTO: 6.97 K/UL — SIGNIFICANT CHANGE UP (ref 1.8–7.4)
NEUTROPHILS NFR BLD AUTO: 66 % — SIGNIFICANT CHANGE UP (ref 43–77)
NRBC # BLD: 0 /100 WBCS — SIGNIFICANT CHANGE UP (ref 0–0)
PLATELET # BLD AUTO: 468 K/UL — HIGH (ref 150–400)
POTASSIUM SERPL-MCNC: 4.5 MMOL/L — SIGNIFICANT CHANGE UP (ref 3.5–5.3)
POTASSIUM SERPL-SCNC: 4.5 MMOL/L — SIGNIFICANT CHANGE UP (ref 3.5–5.3)
PROT SERPL-MCNC: 6.8 G/DL — SIGNIFICANT CHANGE UP (ref 6–8.3)
RBC # BLD: 4.28 M/UL — SIGNIFICANT CHANGE UP (ref 3.8–5.2)
RBC # FLD: 14.6 % — HIGH (ref 10.3–14.5)
SODIUM SERPL-SCNC: 139 MMOL/L — SIGNIFICANT CHANGE UP (ref 135–145)
TIBC SERPL-MCNC: 273 UG/DL — SIGNIFICANT CHANGE UP (ref 220–430)
UIBC SERPL-MCNC: 178 UG/DL — SIGNIFICANT CHANGE UP (ref 110–370)
VIT B12 SERPL-MCNC: 472 PG/ML — SIGNIFICANT CHANGE UP (ref 232–1245)
WBC # BLD: 10.56 K/UL — HIGH (ref 3.8–10.5)
WBC # FLD AUTO: 10.56 K/UL — HIGH (ref 3.8–10.5)

## 2022-07-12 PROCEDURE — 99214 OFFICE O/P EST MOD 30 MIN: CPT

## 2022-07-12 NOTE — PHYSICAL EXAM
[Normal] : affect appropriate [Fully active, able to carry on all pre-disease performance without restriction] : Status 0 - Fully active, able to carry on all pre-disease performance without restriction [de-identified] : looks great  [de-identified] : mediport  [de-identified] :  midline surgical incision healed

## 2022-07-12 NOTE — HISTORY OF PRESENT ILLNESS
[Disease: _____________________] : Disease: [unfilled] [T: ___] : T[unfilled] [N: ___] : N[unfilled] [AJCC Stage: ____] : AJCC Stage: [unfilled] [de-identified] : BOUCHRA PALACIOS is a 45 y.o. with a PMH significant for hypothyroidism, in Oct 2021 diagnosed with  a clinical stage III pancreatic cancer.\par \par ~8/2021 - Presented at age 45 with abdominal pains.  Saw PCP, referred to GI Dr. Maldonado. \par 9/30/21 - Abd US - 3.9 x 4.6 x 4.8cm heterogeneous mass in region of pancreatic tail. \par 10/7/21 - CT A/P - pancreatic tail mass with splenic artery encasement and splenic vein thrombosis ( tumor thrombus).  Borderline PALN's. \par 10/11/21 - 10/15/21 - Admit to  for severe pain.  Was set up for outpatient EGD with biopsy but had severe pains, nausea, dry-heaving and so went to ER. \par 10/11/21 - CT Chest - no evidence of disease.\par 10/11/21 - MRCP - tumor encases splenic artery, celiac artery contact <180, splenic vein occlusion.  Possible retroperitoneal extension with left adrenal gland contact and possible extension into left celiac ganglion.\par 10/12/21 - EUS, FNA pancreatic mass - scanty specimen - PATH - poorly differentiated carcinoma. \par 10/14/21 - Celiac block attempted - not successful - no window due to tumor overlying plexus. \par Case presented at tumor board - for NEOAdjuvant FOLFIRINOX up to 12 cycles, reassess after 4.  Consider chemoRT if suboptimal response. \par \par \par FOLFIRINOX x 8 10/19/21 - 2/15/21 \par \par CT CAP 2/15/22 ( post 8 cycles of  Folfirinox) further decrease in pancreatic mass, no new areas of disease.\par \par Neoadjuvant chemoRT to  further improve resectability .\par \par Completed dose  5000 cGy to pancreas  on 4/18/22 ( Dr Tobar) Concurrent capecitabine- stopped due to worsening neuropathies \par \par CT AP 5/12/22  infiltrative pancreatic tail mass minimally enlarged with occlusion of splenic artery and partial vascular encasement of celiac axis and left hepatic artery\par \par 5/27/22 Distal pancreatectomy, splenectomy, left adrenalectomy  : complete response, no residual cancer. 29 lymph nodes negative .  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 Gained some weight. Eating well. Occasional discomfort in the incision area but otherwise feels great.

## 2022-07-12 NOTE — ASSESSMENT
[FreeTextEntry1] : 45 y/o female BRCA 2 mutation carrier  with clinical stage III  pancreatic adenocarcinoma. \par \par S/p 8 cycles of neodjuvant FOLFIRINOX. \par \par S/p  neoadjuvant RT concurrent capecitabine to  further improve resectability.\par \par  5/27/22 distal pancreatectomy, splenectomy L adrenalectomy , LN dissection. Complete pathologic response.\par \par Recovered well from surgery. \par \par She will need to complete adjuvant chemotherapy - FOLFIRINOX x 4 more cycles for a total of 12 cycles of cristino ( pre and post) operative chemotherapy.\par \par She will start today.\par reviewed antiemetic regimen ( takes Zyprexa and dexamethasone   compazine and Zofran ODT as needed).\par \par Growth factors- Neulasta as needed. \par \par Follow up scans for routine surveillance- after chemo completed.\par \par

## 2022-07-12 NOTE — REVIEW OF SYSTEMS
[Patient Intake Form Reviewed] : Patient intake form was reviewed [Fatigue] : no fatigue [Negative] : Gastrointestinal [FreeTextEntry7] : I  [de-identified] : residual mild neuropathy

## 2022-07-13 DIAGNOSIS — R11.2 NAUSEA WITH VOMITING, UNSPECIFIED: ICD-10-CM

## 2022-07-13 DIAGNOSIS — Z51.11 ENCOUNTER FOR ANTINEOPLASTIC CHEMOTHERAPY: ICD-10-CM

## 2022-07-14 ENCOUNTER — APPOINTMENT (OUTPATIENT)
Dept: INFUSION THERAPY | Facility: CLINIC | Age: 47
End: 2022-07-14

## 2022-07-14 VITALS
SYSTOLIC BLOOD PRESSURE: 108 MMHG | TEMPERATURE: 98 F | DIASTOLIC BLOOD PRESSURE: 68 MMHG | HEART RATE: 70 BPM | OXYGEN SATURATION: 96 % | RESPIRATION RATE: 17 BRPM

## 2022-07-15 DIAGNOSIS — E86.0 DEHYDRATION: ICD-10-CM

## 2022-07-15 NOTE — PHYSICAL EXAM
[Normal] : supple, no neck mass and thyroid not enlarged [Normal Neck Lymph Nodes] : normal neck lymph nodes  [Normal Supraclavicular Lymph Nodes] : normal supraclavicular lymph nodes [Normal Axillary Lymph Nodes] : normal axillary lymph nodes [Normal] : oriented to person, place and time, with appropriate affect [de-identified] : healing incision sites w/o evidence of infection [de-identified] : soft NT ND, healing incision sites without evidence of infection

## 2022-07-15 NOTE — CONSULT LETTER
[Dear  ___] : Dear  [unfilled], [Courtesy Letter:] : I had the pleasure of seeing your patient, [unfilled], in my office today. [Consult Closing:] : Thank you very much for allowing me to participate in the care of this patient.  If you have any questions, please do not hesitate to contact me. [Sincerely,] : Sincerely, [FreeTextEntry3] : Juni Howell MD, MPH, FACS, FSSO\par , [facility] General Surgical Oncology [facility]\par [facility]\par Associate Professor of Surgery\par [employer] at [facility]  [DrVidhi  ___] : Dr. MENDEZ [DrVidhi ___] : Dr. MENDEZ

## 2022-07-15 NOTE — HISTORY OF PRESENT ILLNESS
[de-identified] : Ms. BOUCHRA PALACIOS  is a 46 year  old female  presenting for a post op visit s/p diagnostic lap, distal pancreatectomy, splenectomy, left adrenalectomy, . \par \par HOSPITAL COURSE @ [facility] 10/11/2021-10/15/2021: \par 45 year old female with a PMHx of hypothyroidism and depression presented to the [facility] ED on 10/11 for abdominal pain that had lasted for a month. Prior to presentation patient had been worked up by her gastroenterologist outpatient who had ordered an abdominal ultrasound. Ultrasound at that time showed a pancreatic mass. Subsequent CT abd/pelvis showed a pancreatic tail mass with splenic encasement. Patient was scheduled for a EGD with biopsy with Dr. Campbell however patient was in acute distress and presented to ED on 10/10. Gastroenterology was consulted and patient was seen by Dr. Campbell who performed a EUS FNB. 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 Hem/Onc was consulted and patient was evaluated with Dr. Mendez who did not recommend anti-coagulation because the thrombosis was a direct result of the tumor encasement. Patient was also seen by Dr. Howell Surgical Oncology who plans to resect the mass. Patient case was presented at a GI Tumour board and a 12 cycle course of Folfirinox prior to surgical resection was planned. CXR and CT Abdomen indicated no metastatic disease to the lungs or liver. During hospitalization a nerve block of the celiac plexus was attempted by Adrian but was unsuccessful due to risk of puncturing and seeding the pancreatic mass. Patient's pain was controlled via IV Dilaudid and a Fentanyl patch. Patient \par will continue to take PO Dilaudid and apply the fentanyl patch. Patient also underwent a procedure with Interventional Radiology to implant a chemotherapy port. Patient will see Dr. Mendez outpatient on 10/18/21 and will begin chemotherapy 10/19/21. \par \par Pancreas tail biopsy 10/11/2021- PATH: Poorly differentiated carcinoma\par \par Pts paternal aunt underwent genetic testing and was found to be BRCA 2+.  Pt. then decided to pursue genetic Testing in 2021 which revealed she is a BRCA 2+ gene carrier.  She met with Dr. Vera (Genetic counselor) and will undergo more extensive genetic testing. Results pending. \par \par PMH: Depression, hypothyroidism, BRCA 2+, \par PSH:  EGD/EUS\par Social Hx: Never a smoker, social alcohol use\par \par INTERVAL HISTORY:\par 10/21/2021- Pt. was started on neoadjuvant chemotherapy with FOLFIRINOX on 10/19/2021.  Having nausea and diarrhea.  Also pain in back with poor pain control with narcotic medication.\par \par 21 Ca 19-9 28 U/mL\par \par CT C/A/P performed on 21 revealed Pancreatic tail mass decreased in size since 10/11/2021. Apparent new occlusion splenic artery with extensive splenic infarction. Encasement left gastric artery and celiac axis. Splenic vein occlusion.\par \par 21- Pt cont. on Folfirinox. Today she is feeling pretty well.  She has had some tough symptoms which have been managed appropriately.  She is overall holding up well.\par \par 2022- Pt. testing positive for COVID in 2021. On chemo under the care of Dr. Mendez.  Re-staging CT C/A/P performed on 2/15/2022 showed further decrease in size of the pancreatic tail carcinoma as described with focal abutment less than 180 degrees of proximal celiac artery and proximal common hepatic artery by ill-defined peripancreatic low-attenuation which could represent neurovascular extent of disease versus pancreatitis or treatment-related changes. No venous encasement involving superior mesenteric vein or main portal vein. Similar extent of infiltrative low-attenuation which abuts the left adrenal gland and left celiac ganglion. Acute colitis involving the ascending transverse and descending colon.  No imaging evidence of metastatic disease involving the chest, abdomen or pelvis. \par \par 3/14/2022- Started XRT x 5 weeks with Xeloda (nadeem/ Lourdes Tobar)\par \par 2022- Xeloda was stopped due to neuropathies\par \par 22- XRT completed \par \par 5/10/22- Received 3 vaccines in preparation for splenectomy (Bexsero, Menactra, Hib)\par \par 2022- CT A/P - infiltrative pancreatic tail mass minimally enlarged since 2/15/22 with occlusion of splenic artery and vein and partial encasement celiac axis and left hepatic artery as previously demonstrated.  Size: Approximately 2.7 x 1.7 cm, previously 2.3 x 1.6 cm. \par \par 22- Here to discuss recent CT.  Scheduled for distal pancreatectomy and splenectomy on 22. \par \par ***SURGERY: Diagnostic, laparoscopy, distal pancreatectomy, splenectomy, left adrenalectomy. No metastases on diagnostic lap.\par *** PATHOLOGY:\par Pancreas, spleen and left adrenal gland, distal pancreatectomy,\par splenectomy, and left adrenalectomy\par - Negative for residual carcinoma (Complete response, score 0)\par - Twenty-nine lymph nodes negative for tumor (0/29)\par - Pathologic stage (AJCC 8th Ed.) ypT0N0\par \par 6/15/2022: No post op complications. Incision healing without evidence of infection. Denies fever or chills. No complaints of abdominal pain, nausea, vomiting, or bowel habits. Adequate appetite. Pt has seen Med/Onc - MD Mendez on 6/10- she is to complete 4 more rounds of adjuvant chemo (FOLFIRINOX) and will restart in 6 weeks post surgery. \par \par 2022- She is healing well, scar well approximated, she reports postprandial pains if eating larger portions. She has good appetite. No other abdominal discomfort. Denies N/V/D. \par

## 2022-07-15 NOTE — ASSESSMENT
[FreeTextEntry1] : Ms. BOUCHRA PALACIOS  is a 46 year old female with PMHx hypothyroid, depression and BRCA2+, recently diagnosed with clinical stage III pancreatic tail cancer. Pt. was started on neoadjuvant FOLFIRINOX on 10/19/2021.  Pt. will continue for up to 12 cycles, restage after 4th cycle. \par \par CT C/A/P performed on 12/13/21 revealed Pancreatic tail mass decreased in size since 10/11/2021. Apparent new occlusion splenic artery with extensive splenic infarction. Encasement left gastric artery and celiac axis. Splenic vein occlusion.\par \par -Pt. testing positive for COVID in 12/2021. \par -On chemo under the care of Dr. Mendez.  \par -Re-staging CT C/A/P performed on 2/15/2022 showed further decrease in size of the pancreatic tail carcinoma as described with focal abutment less than 180 degrees of proximal celiac artery and proximal common hepatic artery by ill-defined peripancreatic low-attenuation which could represent neurovascular extent of disease versus pancreatitis or treatment-related changes. No venous encasement involving superior mesenteric vein or main portal vein. Similar extent of infiltrative low-attenuation which abuts the left adrenal gland and left celiac ganglion. Acute colitis involving the ascending transverse and descending colon.  No imaging evidence of metastatic disease involving the chest, abdomen or pelvis. \par \par 3/14/2022- Started XRT x 5 weeks with Xeloda (w/ Lourdes Tobar)\par 4/12/2022- Xeloda was stopped due to neuropathies\par 4/18/22- XRT completed \par 5/12/2022- CT A/P - infiltrative pancreatic tail mass minimally enlarged since 2/15/22 with occlusion of splenic artery and vein and partial encasement celiac axis and left hepatic artery as previously demonstrated.  Size: Approximately 2.7 x 1.7 cm, previously 2.3 x 1.6 cm. \par \par ***SURGERY: Diagnostic, laparoscopy, distal pancreatectomy, splenectomy, left adrenalectomy. No metastases on diagnostic lap.\par *** PATHOLOGY:\par Pancreas, spleen and left adrenal gland, distal pancreatectomy,\par splenectomy, and left adrenalectomy\par - Negative for residual carcinoma (Complete response, score 0)\par - Twenty-nine lymph nodes negative for tumor (0/29)\par - Pathologic stage (AJCC 8th Ed.) ypT0N0\par \par 6/15/2022: No post op complications. Incision healing without evidence of infection. Denies fever or chills. No complaints of abdominal pain, nausea, vomiting, or bowel habits. Adequate appetite. \par \par PLAN:\par 1) RTO 3 months\par 2) F/u w/ med/onc to complete 4 more cycles of adjuvant therapy - will continue again at 6 weeks post op \par 3) Repeat imaging after completing chemotherapy

## 2022-07-25 PROBLEM — B00.1 RECURRENT COLD SORES: Status: RESOLVED | Noted: 2022-03-21 | Resolved: 2022-07-25

## 2022-07-25 PROBLEM — L27.1 HAND FOOT SYNDROME: Status: RESOLVED | Noted: 2022-04-04 | Resolved: 2022-07-25

## 2022-07-26 ENCOUNTER — APPOINTMENT (OUTPATIENT)
Dept: INFUSION THERAPY | Facility: CLINIC | Age: 47
End: 2022-07-26

## 2022-07-26 ENCOUNTER — RESULT REVIEW (OUTPATIENT)
Age: 47
End: 2022-07-26

## 2022-07-26 ENCOUNTER — APPOINTMENT (OUTPATIENT)
Dept: HEMATOLOGY ONCOLOGY | Facility: CLINIC | Age: 47
End: 2022-07-26

## 2022-07-26 VITALS
HEART RATE: 69 BPM | WEIGHT: 160.44 LBS | SYSTOLIC BLOOD PRESSURE: 108 MMHG | BODY MASS INDEX: 27.39 KG/M2 | OXYGEN SATURATION: 98 % | HEIGHT: 64 IN | RESPIRATION RATE: 18 BRPM | DIASTOLIC BLOOD PRESSURE: 73 MMHG | TEMPERATURE: 98 F

## 2022-07-26 DIAGNOSIS — B00.1 HERPESVIRAL VESICULAR DERMATITIS: ICD-10-CM

## 2022-07-26 DIAGNOSIS — L27.1 LOCALIZED SKIN ERUPTION DUE TO DRUGS AND MEDICAMENTS TAKEN INTERNALLY: ICD-10-CM

## 2022-07-26 LAB
ANISOCYTOSIS BLD QL: SLIGHT — SIGNIFICANT CHANGE UP
BASO STIPL BLD QL SMEAR: PRESENT — SIGNIFICANT CHANGE UP
BASOPHILS # BLD AUTO: 0 K/UL — SIGNIFICANT CHANGE UP (ref 0–0.2)
BASOPHILS NFR BLD AUTO: 0 % — SIGNIFICANT CHANGE UP (ref 0–2)
EOSINOPHIL # BLD AUTO: 0.03 K/UL — SIGNIFICANT CHANGE UP (ref 0–0.5)
EOSINOPHIL NFR BLD AUTO: 1 % — SIGNIFICANT CHANGE UP (ref 0–6)
GIANT PLATELETS BLD QL SMEAR: PRESENT — SIGNIFICANT CHANGE UP
HCT VFR BLD CALC: 35.2 % — SIGNIFICANT CHANGE UP (ref 34.5–45)
HGB BLD-MCNC: 11.8 G/DL — SIGNIFICANT CHANGE UP (ref 11.5–15.5)
HOWELL-JOLLY BOD BLD QL SMEAR: PRESENT — SIGNIFICANT CHANGE UP
LG PLATELETS BLD QL AUTO: SLIGHT — SIGNIFICANT CHANGE UP
LYMPHOCYTES # BLD AUTO: 1.22 K/UL — SIGNIFICANT CHANGE UP (ref 1–3.3)
LYMPHOCYTES # BLD AUTO: 37 % — SIGNIFICANT CHANGE UP (ref 13–44)
LYMPHOCYTES # SPEC AUTO: 5 % — HIGH (ref 0–0)
MACROCYTES BLD QL: SLIGHT — SIGNIFICANT CHANGE UP
MCHC RBC-ENTMCNC: 30.5 PG — SIGNIFICANT CHANGE UP (ref 27–34)
MCHC RBC-ENTMCNC: 33.5 GM/DL — SIGNIFICANT CHANGE UP (ref 32–36)
MCV RBC AUTO: 91 FL — SIGNIFICANT CHANGE UP (ref 80–100)
MONOCYTES # BLD AUTO: 0.83 K/UL — SIGNIFICANT CHANGE UP (ref 0–0.9)
MONOCYTES NFR BLD AUTO: 25 % — HIGH (ref 2–14)
NEUTROPHILS # BLD AUTO: 0.79 K/UL — LOW (ref 1.8–7.4)
NEUTROPHILS NFR BLD AUTO: 24 % — LOW (ref 43–77)
NRBC # BLD: 0 /100 — SIGNIFICANT CHANGE UP (ref 0–0)
NRBC # BLD: SIGNIFICANT CHANGE UP /100 WBCS (ref 0–0)
PLAT MORPH BLD: NORMAL — SIGNIFICANT CHANGE UP
PLATELET # BLD AUTO: 387 K/UL — SIGNIFICANT CHANGE UP (ref 150–400)
POIKILOCYTOSIS BLD QL AUTO: SLIGHT — SIGNIFICANT CHANGE UP
RBC # BLD: 3.87 M/UL — SIGNIFICANT CHANGE UP (ref 3.8–5.2)
RBC # FLD: 14.5 % — SIGNIFICANT CHANGE UP (ref 10.3–14.5)
RBC BLD AUTO: SIGNIFICANT CHANGE UP
VARIANT LYMPHS # BLD: 8 % — HIGH (ref 0–6)
WBC # BLD: 3.3 K/UL — LOW (ref 3.8–10.5)
WBC # FLD AUTO: 3.3 K/UL — LOW (ref 3.8–10.5)

## 2022-07-26 PROCEDURE — 99214 OFFICE O/P EST MOD 30 MIN: CPT

## 2022-08-02 ENCOUNTER — RESULT REVIEW (OUTPATIENT)
Age: 47
End: 2022-08-02

## 2022-08-02 ENCOUNTER — APPOINTMENT (OUTPATIENT)
Dept: INFUSION THERAPY | Facility: CLINIC | Age: 47
End: 2022-08-02

## 2022-08-02 VITALS
HEART RATE: 66 BPM | WEIGHT: 163 LBS | BODY MASS INDEX: 28.17 KG/M2 | DIASTOLIC BLOOD PRESSURE: 83 MMHG | RESPIRATION RATE: 18 BRPM | TEMPERATURE: 98.4 F | SYSTOLIC BLOOD PRESSURE: 121 MMHG | HEIGHT: 63.78 IN | OXYGEN SATURATION: 98 %

## 2022-08-02 LAB
ALBUMIN SERPL ELPH-MCNC: 4.1 G/DL — SIGNIFICANT CHANGE UP (ref 3.3–5)
ALP SERPL-CCNC: 121 U/L — HIGH (ref 40–120)
ALT FLD-CCNC: 33 U/L — SIGNIFICANT CHANGE UP (ref 10–45)
ANION GAP SERPL CALC-SCNC: 11 MMOL/L — SIGNIFICANT CHANGE UP (ref 5–17)
AST SERPL-CCNC: 29 U/L — SIGNIFICANT CHANGE UP (ref 10–40)
BASOPHILS # BLD AUTO: 0.05 K/UL — SIGNIFICANT CHANGE UP (ref 0–0.2)
BASOPHILS NFR BLD AUTO: 0.9 % — SIGNIFICANT CHANGE UP (ref 0–2)
BILIRUB SERPL-MCNC: 0.5 MG/DL — SIGNIFICANT CHANGE UP (ref 0.2–1.2)
BUN SERPL-MCNC: 11 MG/DL — SIGNIFICANT CHANGE UP (ref 7–23)
CALCIUM SERPL-MCNC: 9.2 MG/DL — SIGNIFICANT CHANGE UP (ref 8.4–10.5)
CHLORIDE SERPL-SCNC: 102 MMOL/L — SIGNIFICANT CHANGE UP (ref 96–108)
CO2 SERPL-SCNC: 24 MMOL/L — SIGNIFICANT CHANGE UP (ref 22–31)
CREAT SERPL-MCNC: 0.6 MG/DL — SIGNIFICANT CHANGE UP (ref 0.5–1.3)
EGFR: 113 ML/MIN/1.73M2 — SIGNIFICANT CHANGE UP
EOSINOPHIL # BLD AUTO: 0.13 K/UL — SIGNIFICANT CHANGE UP (ref 0–0.5)
EOSINOPHIL NFR BLD AUTO: 2.4 % — SIGNIFICANT CHANGE UP (ref 0–6)
GLUCOSE SERPL-MCNC: 99 MG/DL — SIGNIFICANT CHANGE UP (ref 70–99)
HCT VFR BLD CALC: 38 % — SIGNIFICANT CHANGE UP (ref 34.5–45)
HGB BLD-MCNC: 12.6 G/DL — SIGNIFICANT CHANGE UP (ref 11.5–15.5)
IMM GRANULOCYTES NFR BLD AUTO: 0.7 % — SIGNIFICANT CHANGE UP (ref 0–1.5)
LYMPHOCYTES # BLD AUTO: 1.9 K/UL — SIGNIFICANT CHANGE UP (ref 1–3.3)
LYMPHOCYTES # BLD AUTO: 34.7 % — SIGNIFICANT CHANGE UP (ref 13–44)
MCHC RBC-ENTMCNC: 30.4 PG — SIGNIFICANT CHANGE UP (ref 27–34)
MCHC RBC-ENTMCNC: 33.2 GM/DL — SIGNIFICANT CHANGE UP (ref 32–36)
MCV RBC AUTO: 91.6 FL — SIGNIFICANT CHANGE UP (ref 80–100)
MONOCYTES # BLD AUTO: 1.1 K/UL — HIGH (ref 0–0.9)
MONOCYTES NFR BLD AUTO: 20.1 % — HIGH (ref 2–14)
NEUTROPHILS # BLD AUTO: 2.26 K/UL — SIGNIFICANT CHANGE UP (ref 1.8–7.4)
NEUTROPHILS NFR BLD AUTO: 41.2 % — LOW (ref 43–77)
NRBC # BLD: 0 /100 WBCS — SIGNIFICANT CHANGE UP (ref 0–0)
PLATELET # BLD AUTO: 467 K/UL — HIGH (ref 150–400)
POTASSIUM SERPL-MCNC: 4.5 MMOL/L — SIGNIFICANT CHANGE UP (ref 3.5–5.3)
POTASSIUM SERPL-SCNC: 4.5 MMOL/L — SIGNIFICANT CHANGE UP (ref 3.5–5.3)
PROT SERPL-MCNC: 6.3 G/DL — SIGNIFICANT CHANGE UP (ref 6–8.3)
RBC # BLD: 4.15 M/UL — SIGNIFICANT CHANGE UP (ref 3.8–5.2)
RBC # FLD: 15 % — HIGH (ref 10.3–14.5)
SODIUM SERPL-SCNC: 137 MMOL/L — SIGNIFICANT CHANGE UP (ref 135–145)
WBC # BLD: 5.48 K/UL — SIGNIFICANT CHANGE UP (ref 3.8–10.5)
WBC # FLD AUTO: 5.48 K/UL — SIGNIFICANT CHANGE UP (ref 3.8–10.5)

## 2022-08-04 ENCOUNTER — OUTPATIENT (OUTPATIENT)
Dept: OUTPATIENT SERVICES | Facility: HOSPITAL | Age: 47
LOS: 1 days | Discharge: HOME | End: 2022-08-04

## 2022-08-04 ENCOUNTER — APPOINTMENT (OUTPATIENT)
Dept: INFUSION THERAPY | Facility: CLINIC | Age: 47
End: 2022-08-04

## 2022-08-04 DIAGNOSIS — Z45.2 ENCOUNTER FOR ADJUSTMENT AND MANAGEMENT OF VASCULAR ACCESS DEVICE: Chronic | ICD-10-CM

## 2022-08-04 DIAGNOSIS — Z98.890 OTHER SPECIFIED POSTPROCEDURAL STATES: Chronic | ICD-10-CM

## 2022-08-04 DIAGNOSIS — C25.9 MALIGNANT NEOPLASM OF PANCREAS, UNSPECIFIED: ICD-10-CM

## 2022-08-05 ENCOUNTER — APPOINTMENT (OUTPATIENT)
Dept: INFUSION THERAPY | Facility: CLINIC | Age: 47
End: 2022-08-05

## 2022-08-05 VITALS
HEART RATE: 75 BPM | DIASTOLIC BLOOD PRESSURE: 75 MMHG | OXYGEN SATURATION: 75 % | TEMPERATURE: 97.7 F | RESPIRATION RATE: 18 BRPM | SYSTOLIC BLOOD PRESSURE: 112 MMHG

## 2022-08-05 DIAGNOSIS — E86.0 DEHYDRATION: ICD-10-CM

## 2022-08-05 DIAGNOSIS — R11.2 NAUSEA WITH VOMITING, UNSPECIFIED: ICD-10-CM

## 2022-08-08 DIAGNOSIS — Z51.89 ENCOUNTER FOR OTHER SPECIFIED AFTERCARE: ICD-10-CM

## 2022-08-11 ENCOUNTER — APPOINTMENT (OUTPATIENT)
Dept: INFUSION THERAPY | Facility: CLINIC | Age: 47
End: 2022-08-11

## 2022-08-16 ENCOUNTER — RESULT REVIEW (OUTPATIENT)
Age: 47
End: 2022-08-16

## 2022-08-16 ENCOUNTER — APPOINTMENT (OUTPATIENT)
Dept: HEMATOLOGY ONCOLOGY | Facility: CLINIC | Age: 47
End: 2022-08-16

## 2022-08-16 ENCOUNTER — APPOINTMENT (OUTPATIENT)
Dept: INFUSION THERAPY | Facility: CLINIC | Age: 47
End: 2022-08-16

## 2022-08-16 VITALS
BODY MASS INDEX: 29.1 KG/M2 | HEIGHT: 63 IN | RESPIRATION RATE: 18 BRPM | HEART RATE: 65 BPM | SYSTOLIC BLOOD PRESSURE: 110 MMHG | TEMPERATURE: 97.9 F | DIASTOLIC BLOOD PRESSURE: 72 MMHG | OXYGEN SATURATION: 97 % | WEIGHT: 164.25 LBS

## 2022-08-16 DIAGNOSIS — D70.1 AGRANULOCYTOSIS SECONDARY TO CANCER CHEMOTHERAPY: ICD-10-CM

## 2022-08-16 DIAGNOSIS — T45.1X5A NAUSEA WITH VOMITING, UNSPECIFIED: ICD-10-CM

## 2022-08-16 DIAGNOSIS — T45.1X5A TOXIC GASTROENTERITIS AND COLITIS: ICD-10-CM

## 2022-08-16 DIAGNOSIS — K52.1 TOXIC GASTROENTERITIS AND COLITIS: ICD-10-CM

## 2022-08-16 DIAGNOSIS — R11.2 NAUSEA WITH VOMITING, UNSPECIFIED: ICD-10-CM

## 2022-08-16 DIAGNOSIS — T45.1X5A DRUG-INDUCED POLYNEUROPATHY: ICD-10-CM

## 2022-08-16 DIAGNOSIS — G62.0 DRUG-INDUCED POLYNEUROPATHY: ICD-10-CM

## 2022-08-16 DIAGNOSIS — T45.1X5A AGRANULOCYTOSIS SECONDARY TO CANCER CHEMOTHERAPY: ICD-10-CM

## 2022-08-16 LAB
BASOPHILS # BLD AUTO: 0.05 K/UL — SIGNIFICANT CHANGE UP (ref 0–0.2)
BASOPHILS NFR BLD AUTO: 0.4 % — SIGNIFICANT CHANGE UP (ref 0–2)
EOSINOPHIL # BLD AUTO: 0.15 K/UL — SIGNIFICANT CHANGE UP (ref 0–0.5)
EOSINOPHIL NFR BLD AUTO: 1.1 % — SIGNIFICANT CHANGE UP (ref 0–6)
HCT VFR BLD CALC: 38.4 % — SIGNIFICANT CHANGE UP (ref 34.5–45)
HGB BLD-MCNC: 12.9 G/DL — SIGNIFICANT CHANGE UP (ref 11.5–15.5)
IMM GRANULOCYTES NFR BLD AUTO: 1.7 % — HIGH (ref 0–1.5)
LYMPHOCYTES # BLD AUTO: 1.73 K/UL — SIGNIFICANT CHANGE UP (ref 1–3.3)
LYMPHOCYTES # BLD AUTO: 13.1 % — SIGNIFICANT CHANGE UP (ref 13–44)
MCHC RBC-ENTMCNC: 30.4 PG — SIGNIFICANT CHANGE UP (ref 27–34)
MCHC RBC-ENTMCNC: 33.6 GM/DL — SIGNIFICANT CHANGE UP (ref 32–36)
MCV RBC AUTO: 90.4 FL — SIGNIFICANT CHANGE UP (ref 80–100)
MONOCYTES # BLD AUTO: 1.72 K/UL — HIGH (ref 0–0.9)
MONOCYTES NFR BLD AUTO: 13 % — SIGNIFICANT CHANGE UP (ref 2–14)
NEUTROPHILS # BLD AUTO: 9.32 K/UL — HIGH (ref 1.8–7.4)
NEUTROPHILS NFR BLD AUTO: 70.7 % — SIGNIFICANT CHANGE UP (ref 43–77)
NRBC # BLD: 0 /100 WBCS — SIGNIFICANT CHANGE UP (ref 0–0)
PLATELET # BLD AUTO: 403 K/UL — HIGH (ref 150–400)
RBC # BLD: 4.25 M/UL — SIGNIFICANT CHANGE UP (ref 3.8–5.2)
RBC # FLD: 16.1 % — HIGH (ref 10.3–14.5)
WBC # BLD: 13.19 K/UL — HIGH (ref 3.8–10.5)
WBC # FLD AUTO: 13.19 K/UL — HIGH (ref 3.8–10.5)

## 2022-08-16 PROCEDURE — 99213 OFFICE O/P EST LOW 20 MIN: CPT

## 2022-08-16 NOTE — PHYSICAL EXAM
[Fully active, able to carry on all pre-disease performance without restriction] : Status 0 - Fully active, able to carry on all pre-disease performance without restriction [Normal] : grossly intact [de-identified] : looks great  [de-identified] : anicteric [de-identified] : no c/c/e, right port without s/s infection [de-identified] : no rashes

## 2022-08-16 NOTE — REVIEW OF SYSTEMS
[Patient Intake Form Reviewed] : Patient intake form was reviewed [Negative] : Allergic/Immunologic [Fatigue] : fatigue [FreeTextEntry2] : usual chemo fatigue [FreeTextEntry4] : mild mouth "sores" - ulcers [FreeTextEntry7] : see interval hx [de-identified] : mild neuropathies hands and feet

## 2022-08-16 NOTE — HISTORY OF PRESENT ILLNESS
[Disease: _____________________] : Disease: [unfilled] [T: ___] : T[unfilled] [N: ___] : N[unfilled] [AJCC Stage: ____] : AJCC Stage: [unfilled] [de-identified] : BOUCHRA PALACIOS is a 45 y.o. with a PMH significant for hypothyroidism, in Oct 2021 diagnosed with a cIII T4 pancreatic tail cancer -> y stage 0.\par \par ~8/2021 - Presented at age 45 with abdominal pains.   W/u with pancreatic tail mass with splenic artery encasement and splenic vein thrombosis (tumor thrombus).  Borderline PALN's. \par 10/11/21 - MRCP - tumor encases splenic artery, celiac artery contact <180, splenic vein occlusion.  Possible retroperitoneal extension with left adrenal gland contact and possible extension into left celiac ganglion.\par 10/12/21 -  PATH - poorly differentiated carcinoma. \par  \par 10/19/21 - 2/15/21 - NEOAdjuvant FOLFIRINOX x 8 \par \par 2/15/22 - CT C/A/P - (after 8 cycles) further decrease in pancreatic mass, no new areas of disease.\par \par 3/14/22 - 4/18/22 - Chemo/RT to pancreas -  5000 cGy with concurrent capecitabine- stopped due to worsening neuropathies.  (Dr. Tobar).\par \par 5/27/22 - Distal pancreatectomy, splenectomy, left adrenalectomy -complete response, no residual cancer.  29 lymph nodes negative.  (Dr. 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 Had chemo on a Tuesday.  She did have some acute diarrhea Tuesday nite into Wed, but then bowels were fine. \par Had some mild "mouth sores"  - reports she had stopped acyclovir. \par Mild neuropathies in her fingers and toes.\par Denies any changes in her family, medical, or social history since her last visit of 7/12/22. \par

## 2022-08-16 NOTE — PHYSICAL EXAM
[Fully active, able to carry on all pre-disease performance without restriction] : Status 0 - Fully active, able to carry on all pre-disease performance without restriction [Normal] : affect appropriate [de-identified] : anicteric, poofy today [de-identified] : no c/c/e, right port without s/s infection [de-identified] : no rashes

## 2022-08-16 NOTE — REASON FOR VISIT
[Follow-Up Visit] : a follow-up [Pre-Treatment Visit] : a pre-treatment [FreeTextEntry2] : Pancreatic Cancer - D1 C10 Adjuvant FOLFIRINOX

## 2022-08-16 NOTE — REASON FOR VISIT
[Follow-Up Visit] : a follow-up [Pre-Treatment Visit] : a pre-treatment [FreeTextEntry2] : Pancreatic Cancer - D1 C11 Adjuvant FOLFIRINOX

## 2022-08-16 NOTE — ASSESSMENT
[FreeTextEntry1] : Patient is a 46 y.o. BRCA2 mutation carrier with c III  pancreatic tail adenocarcinoma, s/p 8 cycles of of neoadjuvant FOLFIRINOX ->  neoadjuvant RT/capecitabine -> distal pancreatectomy, splenectomy resection on 5/27/22 with a complete response.  \par \par 7/12/22 - Re-Started on Adjuvant FOLFIRINOX 9/12 \par Tolerated very well. \par C# 10/12 - delayed 1 week due to neutropenia. \par C#11 due today.  Doing OK with cumulative fatigue. \par \par 1. Neutropenia - can hold growth factor today as ANC 9.3.  \par 2. N/V - Doing well on current regime for delayed N/V - Aloxi on disconnect day and Decadron D3,4,5.\par 3. Diarrhea - Atropine added as a premed instead of PRN.  Dose increased.  Patient aware can take Imodium as well. \par 4. Mouth sores - continue acyclovir until ~ 3 months after chemo has been completed. \par 5. Neuropathies - Reminded patient cumulative - will continue to monitor closely. \par 6. Onc - C#12 8/30/22.  PAtient given Rx for new scans to have ~ 1 month post chemo.  Instructed to schedule PE with Dr. Mendez ~ 1 week after scans to review. \par Advised to see me in mid-September to review Survivor care plan. \par \par ETHEL AGRCÍA \par Lourdes Tobar\par Juni Howell\par Akash Cobian\par

## 2022-08-16 NOTE — REVIEW OF SYSTEMS
[Patient Intake Form Reviewed] : Patient intake form was reviewed [Fatigue] : fatigue [Negative] : Allergic/Immunologic [FreeTextEntry2] : usual chemo fatigue [FreeTextEntry4] : mild mouth "sores" - ulcers - better since starting Acyclovir [FreeTextEntry7] : see interval hx [de-identified] : mild neuropathies hands and feet

## 2022-08-16 NOTE — HISTORY OF PRESENT ILLNESS
[Disease: _____________________] : Disease: [unfilled] [T: ___] : T[unfilled] [N: ___] : N[unfilled] [AJCC Stage: ____] : AJCC Stage: [unfilled] [de-identified] : BOUCHRA PALACIOS is a 45 y.o. with a PMH significant for hypothyroidism, in Oct 2021 diagnosed with a cIII T4 pancreatic tail cancer -> y stage 0.\par \par ~8/2021 - Presented at age 45 with abdominal pains.   W/u with pancreatic tail mass with splenic artery encasement and splenic vein thrombosis (tumor thrombus).  Borderline PALN's. \par 10/11/21 - MRCP - tumor encases splenic artery, celiac artery contact <180, splenic vein occlusion.  Possible retroperitoneal extension with left adrenal gland contact and possible extension into left celiac ganglion.\par 10/12/21 -  PATH - poorly differentiated carcinoma. \par  \par 10/19/21 - 2/15/21 - NEOAdjuvant FOLFIRINOX x 8 \par \par 2/15/22 - CT C/A/P - (after 8 cycles) further decrease in pancreatic mass, no new areas of disease.\par \par 3/14/22 - 4/18/22 - Chemo/RT to pancreas -  5000 cGy with concurrent capecitabine- stopped due to worsening neuropathies.  (Dr. Tobar).\par \par 5/27/22 - Distal pancreatectomy, splenectomy, left adrenalectomy -complete response, no residual cancer.  29 lymph nodes negative.  (Dr. Howell) \par \par 7/12/22 - Restarted FOLFIRINOX #9/12\par C#10 dose delayed 1 week due to neutropenia.  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  C#10 was delayed 1 week due to neutropenia. \par Major complaint is that she has diarrhea D2 - 3, then is OK.  Reluctant to take Imodium since it is short lived.  \lakhwinder Has delayed nausea but it is now well managed with Decadron and D3 Aloxi.  \par Neuropathies in hands a bit better, toes the same. \lakhwinder Has allergies today. \lakhwinder Denies any changes in her family, medical, or social history since her last visit of 7/12/22. \par

## 2022-08-16 NOTE — ASSESSMENT
[FreeTextEntry1] : Patient is a 46 y.o. BRCA2 mutation carrier with c III  pancreatic tail adenocarcinoma, s/p 8 cycles of of neoadjuvant FOLFIRINOX ->  neoadjuvant RT/capecitabine -> distal pancreatectomy, splenectomy resection on 5/27/22 with a complete response.  \par \par 7/12/22 - Re-Started on Adjuvant FOLFIRINOX 9/12 \par Tolerated very well. \par Today # 10/12 - ANC only 0.79\par \par 1. Neutropenia - will need to delay treatment x 1 week, will then need growth factor after.  \par 2. N/V - Doing well on current regime for delayed N/V - Aloxi on disconnect day and Decadron D3,4,5.\par 3. Diarrhea - Atropine added as a premed instead of PRN.  Dose increased.  Patient aware can take Imodium as well. \par 4. Mouth sores - patient instructed to restart acyclovir and remain on this until ~ 3 months after chemo has been completed. \par 5. Neuropathies - Reminded patient cumulative - will continue to monitor closely. \par PE wit C#11 8/16/22. \par \par ETHEL GARCÍA \par Lourdes Tobar\par Juni Howell\par Akash Cobian\par

## 2022-08-17 DIAGNOSIS — F41.9 ANXIETY DISORDER, UNSPECIFIED: ICD-10-CM

## 2022-08-17 DIAGNOSIS — Z79.899 OTHER LONG TERM (CURRENT) DRUG THERAPY: ICD-10-CM

## 2022-08-17 DIAGNOSIS — D70.1 AGRANULOCYTOSIS SECONDARY TO CANCER CHEMOTHERAPY: ICD-10-CM

## 2022-08-17 DIAGNOSIS — G62.0 DRUG-INDUCED POLYNEUROPATHY: ICD-10-CM

## 2022-08-17 DIAGNOSIS — Z51.11 ENCOUNTER FOR ANTINEOPLASTIC CHEMOTHERAPY: ICD-10-CM

## 2022-08-17 DIAGNOSIS — K52.1 TOXIC GASTROENTERITIS AND COLITIS: ICD-10-CM

## 2022-08-18 ENCOUNTER — APPOINTMENT (OUTPATIENT)
Dept: INFUSION THERAPY | Facility: CLINIC | Age: 47
End: 2022-08-18

## 2022-08-30 ENCOUNTER — RESULT REVIEW (OUTPATIENT)
Age: 47
End: 2022-08-30

## 2022-08-30 ENCOUNTER — APPOINTMENT (OUTPATIENT)
Dept: INFUSION THERAPY | Facility: CLINIC | Age: 47
End: 2022-08-30

## 2022-08-30 VITALS
OXYGEN SATURATION: 98 % | RESPIRATION RATE: 18 BRPM | SYSTOLIC BLOOD PRESSURE: 116 MMHG | DIASTOLIC BLOOD PRESSURE: 78 MMHG | WEIGHT: 157.13 LBS | TEMPERATURE: 98.1 F | HEART RATE: 71 BPM | BODY MASS INDEX: 27.83 KG/M2

## 2022-08-30 LAB
ALBUMIN SERPL ELPH-MCNC: 4.1 G/DL — SIGNIFICANT CHANGE UP (ref 3.3–5)
ALP SERPL-CCNC: 108 U/L — SIGNIFICANT CHANGE UP (ref 40–120)
ALT FLD-CCNC: 34 U/L — SIGNIFICANT CHANGE UP (ref 10–45)
ANION GAP SERPL CALC-SCNC: 13 MMOL/L — SIGNIFICANT CHANGE UP (ref 5–17)
AST SERPL-CCNC: 32 U/L — SIGNIFICANT CHANGE UP (ref 10–40)
BASOPHILS # BLD AUTO: 0.02 K/UL — SIGNIFICANT CHANGE UP (ref 0–0.2)
BASOPHILS NFR BLD AUTO: 0.4 % — SIGNIFICANT CHANGE UP (ref 0–2)
BILIRUB SERPL-MCNC: 0.3 MG/DL — SIGNIFICANT CHANGE UP (ref 0.2–1.2)
BUN SERPL-MCNC: 10 MG/DL — SIGNIFICANT CHANGE UP (ref 7–23)
CALCIUM SERPL-MCNC: 9.8 MG/DL — SIGNIFICANT CHANGE UP (ref 8.4–10.5)
CHLORIDE SERPL-SCNC: 102 MMOL/L — SIGNIFICANT CHANGE UP (ref 96–108)
CO2 SERPL-SCNC: 25 MMOL/L — SIGNIFICANT CHANGE UP (ref 22–31)
CREAT SERPL-MCNC: 0.68 MG/DL — SIGNIFICANT CHANGE UP (ref 0.5–1.3)
EGFR: 110 ML/MIN/1.73M2 — SIGNIFICANT CHANGE UP
EOSINOPHIL # BLD AUTO: 0.17 K/UL — SIGNIFICANT CHANGE UP (ref 0–0.5)
EOSINOPHIL NFR BLD AUTO: 3.3 % — SIGNIFICANT CHANGE UP (ref 0–6)
GLUCOSE SERPL-MCNC: 93 MG/DL — SIGNIFICANT CHANGE UP (ref 70–99)
HCT VFR BLD CALC: 37.3 % — SIGNIFICANT CHANGE UP (ref 34.5–45)
HGB BLD-MCNC: 12.8 G/DL — SIGNIFICANT CHANGE UP (ref 11.5–15.5)
IMM GRANULOCYTES NFR BLD AUTO: 0.2 % — SIGNIFICANT CHANGE UP (ref 0–1.5)
LYMPHOCYTES # BLD AUTO: 1.58 K/UL — SIGNIFICANT CHANGE UP (ref 1–3.3)
LYMPHOCYTES # BLD AUTO: 30.3 % — SIGNIFICANT CHANGE UP (ref 13–44)
MCHC RBC-ENTMCNC: 30.3 PG — SIGNIFICANT CHANGE UP (ref 27–34)
MCHC RBC-ENTMCNC: 34.3 GM/DL — SIGNIFICANT CHANGE UP (ref 32–36)
MCV RBC AUTO: 88.4 FL — SIGNIFICANT CHANGE UP (ref 80–100)
MONOCYTES # BLD AUTO: 1.05 K/UL — HIGH (ref 0–0.9)
MONOCYTES NFR BLD AUTO: 20.2 % — HIGH (ref 2–14)
NEUTROPHILS # BLD AUTO: 2.38 K/UL — SIGNIFICANT CHANGE UP (ref 1.8–7.4)
NEUTROPHILS NFR BLD AUTO: 45.6 % — SIGNIFICANT CHANGE UP (ref 43–77)
NRBC # BLD: 0 /100 WBCS — SIGNIFICANT CHANGE UP (ref 0–0)
PLATELET # BLD AUTO: 363 K/UL — SIGNIFICANT CHANGE UP (ref 150–400)
POTASSIUM SERPL-MCNC: 4.4 MMOL/L — SIGNIFICANT CHANGE UP (ref 3.5–5.3)
POTASSIUM SERPL-SCNC: 4.4 MMOL/L — SIGNIFICANT CHANGE UP (ref 3.5–5.3)
PROT SERPL-MCNC: 6.7 G/DL — SIGNIFICANT CHANGE UP (ref 6–8.3)
RBC # BLD: 4.22 M/UL — SIGNIFICANT CHANGE UP (ref 3.8–5.2)
RBC # FLD: 16.7 % — HIGH (ref 10.3–14.5)
SODIUM SERPL-SCNC: 140 MMOL/L — SIGNIFICANT CHANGE UP (ref 135–145)
WBC # BLD: 5.21 K/UL — SIGNIFICANT CHANGE UP (ref 3.8–10.5)
WBC # FLD AUTO: 5.21 K/UL — SIGNIFICANT CHANGE UP (ref 3.8–10.5)

## 2022-09-01 ENCOUNTER — APPOINTMENT (OUTPATIENT)
Dept: INFUSION THERAPY | Facility: CLINIC | Age: 47
End: 2022-09-01

## 2022-09-01 VITALS
OXYGEN SATURATION: 98 % | SYSTOLIC BLOOD PRESSURE: 109 MMHG | RESPIRATION RATE: 17 BRPM | HEART RATE: 80 BPM | DIASTOLIC BLOOD PRESSURE: 72 MMHG | TEMPERATURE: 98.3 F

## 2022-09-08 ENCOUNTER — APPOINTMENT (OUTPATIENT)
Dept: SURGICAL ONCOLOGY | Facility: CLINIC | Age: 47
End: 2022-09-08

## 2022-09-08 VITALS
DIASTOLIC BLOOD PRESSURE: 84 MMHG | SYSTOLIC BLOOD PRESSURE: 122 MMHG | OXYGEN SATURATION: 98 % | WEIGHT: 156 LBS | BODY MASS INDEX: 27.64 KG/M2 | HEART RATE: 75 BPM | HEIGHT: 63 IN | RESPIRATION RATE: 21 BRPM

## 2022-09-08 PROCEDURE — 99214 OFFICE O/P EST MOD 30 MIN: CPT

## 2022-09-11 NOTE — ASSESSMENT
[FreeTextEntry1] : Ms. BOUCHRA PALACIOS  is a 46 year old female with PMHx hypothyroid, depression and BRCA2+, recently diagnosed with clinical stage III pancreatic tail cancer. Pt. was started on neoadjuvant FOLFIRINOX on 10/19/2021.  Pt. will continue for up to 12 cycles, restage after 4th cycle. \par \par CT C/A/P performed on 12/13/21 revealed Pancreatic tail mass decreased in size since 10/11/2021. Apparent new occlusion splenic artery with extensive splenic infarction. Encasement left gastric artery and celiac axis. Splenic vein occlusion.\par \par -Pt. testing positive for COVID in 12/2021. \par -On chemo under the care of Dr. Mendez.  \par -Re-staging CT C/A/P performed on 2/15/2022 showed further decrease in size of the pancreatic tail carcinoma as described with focal abutment less than 180 degrees of proximal celiac artery and proximal common hepatic artery by ill-defined peripancreatic low-attenuation which could represent neurovascular extent of disease versus pancreatitis or treatment-related changes. No venous encasement involving superior mesenteric vein or main portal vein. Similar extent of infiltrative low-attenuation which abuts the left adrenal gland and left celiac ganglion. Acute colitis involving the ascending transverse and descending colon.  No imaging evidence of metastatic disease involving the chest, abdomen or pelvis. \par \par 3/14/2022- Started XRT x 5 weeks with Xeloda (w/ Lourdes Tobar)\par 4/12/2022- Xeloda was stopped due to neuropathies\par 4/18/22- XRT completed \par 5/12/2022- CT A/P - infiltrative pancreatic tail mass minimally enlarged since 2/15/22 with occlusion of splenic artery and vein and partial encasement celiac axis and left hepatic artery as previously demonstrated.  Size: Approximately 2.7 x 1.7 cm, previously 2.3 x 1.6 cm. \par \par ***SURGERY: Diagnostic, laparoscopy, distal pancreatectomy, splenectomy, left adrenalectomy. No metastases on diagnostic lap.\par *** PATHOLOGY:\par Pancreas, spleen and left adrenal gland, distal pancreatectomy,\par splenectomy, and left adrenalectomy\par - Negative for residual carcinoma (Complete response, score 0)\par - Twenty-nine lymph nodes negative for tumor (0/29)\par - Pathologic stage (AJCC 8th Ed.) [id number]\par \par 6/15/2022: No post op complications. Incision healing without evidence of infection. Denies fever or chills. No complaints of abdominal pain, nausea, vomiting, or bowel habits. Adequate appetite. \par \par PLAN:\par 1) RTO 3 months\par 2) Completed chemotherapy - doing well\par 3) Repeat imaging after completing chemotherapy\par 4) Will need to obtain MRI breast for high risk screening\par 5) Will also refer to gynecology for discussion of prophylactic oophorectomy

## 2022-09-11 NOTE — REASON FOR VISIT
[Follow-Up Visit] : a follow-up visit for [FreeTextEntry2] : s/p diagnostic lap, distal pancreatectomy, splenectomy, left adrenalectomy

## 2022-09-11 NOTE — CONSULT LETTER
[Dear  ___] : Dear  [unfilled], [Courtesy Letter:] : I had the pleasure of seeing your patient, [unfilled], in my office today. [Consult Closing:] : Thank you very much for allowing me to participate in the care of this patient.  If you have any questions, please do not hesitate to contact me. [Sincerely,] : Sincerely, [DrVidhi  ___] : Dr. MENDEZ [DrVidhi ___] : Dr. MENDEZ [FreeTextEntry3] : Juni Howell MD, MPH, FACS, FSSO\par , [facility]\par [facility]\par Associate Professor of Surgery\par [employer] [facility]

## 2022-09-11 NOTE — HISTORY OF PRESENT ILLNESS
[de-identified] : Ms. BOUCHRA PALACIOS  is a 46 year  old female  presenting for a follow up visit.\par She is s/p diagnostic lap, distal pancreatectomy, splenectomy, left adrenalectomy, . \par \par HOSPITAL COURSE @ \[facility]\"" Hospital 10/11/2021-10/15/2021: \par 45 year old female with a PMHx of hypothyroidism and depression [facility]11 for abdominal pain that had lasted for a month. Prior to presentation patient had been worked up by her gastroenterologist outpatient who had ordered an abdominal ultrasound. Ultrasound at that time showed a pancreatic mass. Subsequent CT abd/pelvis showed a pancreatic tail mass with splenic encasement. Patient was scheduled for a EGD with biopsy with Dr. Campbell however patient was in acute distress and presented to ED on 10/10. Gastroenterology was consulted and patient was seen by Dr. Campbell who performed a EUS FNB. 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 Hem/Onc was consulted and patient was evaluated with Dr. Menedz who did not recommend anti-coagulation because the thrombosis was a direct result of the tumor encasement. Patient was also seen by Dr. Howell Surgical Oncology who plans to resect the mass. Patient case was presented at a GI Tumour board and a 12 cycle course of Folfirinox prior to surgical resection was planned. CXR and CT Abdomen indicated no metastatic disease to the lungs or liver. During hospitalization a nerve block of the celiac plexus was attempted by Adrian but was unsuccessful due to risk of puncturing and seeding the pancreatic mass. Patient's pain was controlled via IV Dilaudid and a Fentanyl patch. Patient \par will continue to take PO Dilaudid and apply the fentanyl patch. Patient also underwent a procedure with Interventional Radiology to implant a chemotherapy port. Patient will see Dr. Mendez outpatient on 10/18/21 and will begin chemotherapy 10/19/21. \par \par Pancreas tail biopsy 10/11/2021- PATH: Poorly differentiated carcinoma\par \par Pts paternal aunt underwent genetic testing and was found to be BRCA 2+.  Pt. then decided to pursue genetic Testing in 2021 which revealed she is a BRCA 2+ gene carrier.  She met with Dr. Vera (Genetic counselor) and will undergo more extensive genetic testing. Results pending. \par \par PMH: Depression, hypothyroidism, BRCA 2+, \par PSH:  EGD/EUS\par Social Hx: Never a smoker, social alcohol use\par \par INTERVAL HISTORY:\par 10/21/2021- Pt. was started on neoadjuvant chemotherapy with FOLFIRINOX on 10/19/2021.  Having nausea and diarrhea.  Also pain in back with poor pain control with narcotic medication.\par \par 21 Ca 19-9 28 U/mL\par \par CT C/A/P performed on 21 revealed Pancreatic tail mass decreased in size since 10/11/2021. Apparent new occlusion splenic artery with extensive splenic infarction. Encasement left gastric artery and celiac axis. Splenic vein occlusion.\par \par 21- Pt cont. on Folfirinox. Today she is feeling pretty well.  She has had some tough symptoms which have been managed appropriately.  She is overall holding up well.\par \par 2022- Pt. testing positive for COVID in 2021. On chemo under the care of Dr. Mendez.  Re-staging CT C/A/P performed on 2/15/2022 showed further decrease in size of the pancreatic tail carcinoma as described with focal abutment less than 180 degrees of proximal celiac artery and proximal common hepatic artery by ill-defined peripancreatic low-attenuation which could represent neurovascular extent of disease versus pancreatitis or treatment-related changes. No venous encasement involving superior mesenteric vein or main portal vein. Similar extent of infiltrative low-attenuation which abuts the left adrenal gland and left celiac ganglion. Acute colitis involving the ascending transverse and descending colon.  No imaging evidence of metastatic disease involving the chest, abdomen or pelvis. \par \par 3/14/2022- Started XRT x 5 weeks with Xeloda (nadeem/ Lourdes Tobar)\par \par 2022- Xeloda was stopped due to neuropathies\par \par 22- XRT completed \par \par 5/10/22- Received 3 vaccines in preparation for splenectomy (Bexsero, Menactra, Hib)\par \par 2022- CT A/P - infiltrative pancreatic tail mass minimally enlarged since 2/15/22 with occlusion of splenic artery and vein and partial encasement celiac axis and left hepatic artery as previously demonstrated.  Size: Approximately 2.7 x 1.7 cm, previously 2.3 x 1.6 cm. \par \par 22- Here to discuss recent CT.  Scheduled for distal pancreatectomy and splenectomy on 22. \par \par ***SURGERY 22: Diagnostic, laparoscopy, distal pancreatectomy, splenectomy, left adrenalectomy. No metastases on diagnostic lap.\par *** PATHOLOGY:\par Pancreas, spleen and left adrenal gland, distal pancreatectomy,\par splenectomy, and left adrenalectomy\par - Negative for residual carcinoma (Complete response, score 0)\par - Twenty-nine lymph nodes negative for tumor (0/)\par - Pathologic stage (AJCC 8th Ed.) [id number]\par \par 6/15/2022: No post op complications. Incision healing without evidence of infection. Denies fever or chills. No complaints of abdominal pain, nausea, vomiting, or bowel habits. Adequate appetite. Pt has seen Med/Onc - MD Mendez on 6/10- she is to complete 4 more rounds of adjuvant chemo (FOLFIRINOX) and will restart in 6 weeks post surgery. \par \par 2022- She is healing well, scar well approximated, she reports postprandial pains if eating larger portions. She has good appetite. No other abdominal discomfort. Denies N/V/D. \par \par 22- She finished chemotherapy 2022, still experiencing symptoms related to the treatment: N/D no vomiting. Neuropathy present in feet mostly and somewhat hands but improving.\par

## 2022-09-13 ENCOUNTER — RESULT REVIEW (OUTPATIENT)
Age: 47
End: 2022-09-13

## 2022-09-27 ENCOUNTER — OUTPATIENT (OUTPATIENT)
Dept: OUTPATIENT SERVICES | Facility: HOSPITAL | Age: 47
LOS: 1 days | End: 2022-09-27
Payer: COMMERCIAL

## 2022-09-27 ENCOUNTER — APPOINTMENT (OUTPATIENT)
Dept: CT IMAGING | Facility: CLINIC | Age: 47
End: 2022-09-27

## 2022-09-27 DIAGNOSIS — Z98.890 OTHER SPECIFIED POSTPROCEDURAL STATES: Chronic | ICD-10-CM

## 2022-09-27 DIAGNOSIS — Z45.2 ENCOUNTER FOR ADJUSTMENT AND MANAGEMENT OF VASCULAR ACCESS DEVICE: Chronic | ICD-10-CM

## 2022-09-27 DIAGNOSIS — C25.9 MALIGNANT NEOPLASM OF PANCREAS, UNSPECIFIED: ICD-10-CM

## 2022-09-27 PROCEDURE — 74177 CT ABD & PELVIS W/CONTRAST: CPT | Mod: 26

## 2022-09-27 PROCEDURE — 74177 CT ABD & PELVIS W/CONTRAST: CPT

## 2022-09-27 PROCEDURE — 71260 CT THORAX DX C+: CPT

## 2022-09-27 PROCEDURE — 71260 CT THORAX DX C+: CPT | Mod: 26

## 2022-09-29 ENCOUNTER — APPOINTMENT (OUTPATIENT)
Dept: HEMATOLOGY ONCOLOGY | Facility: CLINIC | Age: 47
End: 2022-09-29

## 2022-09-29 VITALS
RESPIRATION RATE: 18 BRPM | TEMPERATURE: 98.1 F | WEIGHT: 158.13 LBS | DIASTOLIC BLOOD PRESSURE: 77 MMHG | OXYGEN SATURATION: 98 % | SYSTOLIC BLOOD PRESSURE: 116 MMHG | BODY MASS INDEX: 28.01 KG/M2 | HEART RATE: 74 BPM

## 2022-09-29 PROCEDURE — 99215 OFFICE O/P EST HI 40 MIN: CPT

## 2022-09-29 NOTE — DISCUSSION/SUMMARY
[Cancer Type / Location / Histology Subtype: ________] : Cancer Type / Location / Histology Subtype: [unfilled] [Diagnosis Date (year): ____] : Diagnosis Date (year): [unfilled] [Surgery] : Surgery: Yes [Surgery Date(s) (year): ____] : Surgery Date(s) (year): [unfilled] [Surgical Procedure / Location / Findings: _________] : Surgical Procedure / Location / Findings: [unfilled] [Radiation] : Radiation: Yes [Body Area Treated: _________] : Body Area Treated: [unfilled] [End Date (year): ____] : End Date (year): [unfilled] [Systemic Therapy (chemotherapy, hormonal therapy, other)] : Systemic Therapy (chemotherapy, hormonal therapy, other): Yes [Genetic / hereditary risk factor(s) or predisposing conditions: __________] : Genetic / hereditary risk factor(s) or predisposing conditions: [unfilled] [Genetic Counseling] : Genetic Counseling: Yes [Follow up with Oncologist in _____] : Follow up with Oncologist in [unfilled] [Follow up with Surgeon in _____] : Follow up with Surgeon in [unfilled] [Follow up with Radiation MD in _____] : Follow up with Radiation MD in [unfilled] [Primary care physician] : primary care physician [Colonoscopy] : colonoscopy [Skin Checks] : skin checks [Annual Flu Shot] : annual flu shot [Other: _____] : [unfilled] [Cognitive Function] : cognitive function [Sexual Function] : sexual function [Emotional and mental health] : Emotional and mental health [Physical Functioning] : Physical functioning [Memory or Concentration Loss] : Memory or concentration loss [Fatigue] : Fatigue [Weight Changes] : Weight changes [Fertility] : Fertility [Weigh Management (loss / gain)] : Weight management (loss / gain) [Diet] : Diet [Physical activity] : Physical activity [Path to Wellness Survivorship Program] : Path to Wellness Survivorship Program [American Cancer Society] : the American Cancer Society [FreeTextEntry1] : NEOADJUVANT FOLFIRINOX x 8, then adjuvant x 4. \par NEOADJUVANT 10/19/21 - 2/8/22:\par Oxaliplatin                     85mg/m2     Q14 x 6,  Dose reduced to 75mg/m2 for C6 & 7.\par Leucovorin                   400mg/m2    Q14 x 6, Dose reduced to 200mg/m2 for C6 & 7\par Irinotecan                      150mg/m2    Q14 x 6, Dose reduced to 130mg/m2 for C6 & 7\par 5FU bolus                      400mg/m2    x 1 - (dropped after C1)\par 5FU CI                            2400mg/m2  Q14 x 6, Dose reduced to 2000mg/m2 for C6 & 7\par \par Patient had a 1 week dose delay after C3 for neutropenia.  \par Had a 1 week dose delay after C5 for COVID.\par \par \par ADJUVANT FOLFIRINOX x 4 cycles 7/12/22 - 8/16/22 - \par Oxaliplatin                    75mg/m2     Q14 x 4 \par Leucovorin                   200mg/m2    Q14 x 4 \par Irinotecan                      130mg/m2    Q14 x 4 \par 5FU CI                            2000mg/m2  Q14 x 4 \par \par 1 week dose delay after C #9 for neutropenia. \par \par Had 25 fractions XRT.  Xeloda was held last 4 days due to neuropathies.  [de-identified] : Port flush every 6 weeks (every other practitioner visit). \par Labs every other visit\par CT scans ~ every 6 months for 1st 2 years [de-identified] : Should see breast surgeon as high risk for breast cancer.  They will order breast imaging - mammo/sono/MRI's.\par Yearly GYN exam as high risk for ovarian cancer. \par Per Dr. Metcalf - slightly higher risk for melanoma - consider annual full body skin exams.  [FreeTextEntry7] : [facility] [FreeTextEntry8] : Stacy Cummings RN, NP, AOCNP [FreeTextEntry9] : 9/29/22

## 2022-09-30 DIAGNOSIS — Z15.01 GENETIC SUSCEPTIBILITY TO MALIGNANT NEOPLASM OF BREAST: ICD-10-CM

## 2022-10-13 ENCOUNTER — OUTPATIENT (OUTPATIENT)
Dept: OUTPATIENT SERVICES | Facility: HOSPITAL | Age: 47
LOS: 1 days | Discharge: HOME | End: 2022-10-13

## 2022-10-13 DIAGNOSIS — Z98.890 OTHER SPECIFIED POSTPROCEDURAL STATES: Chronic | ICD-10-CM

## 2022-10-13 DIAGNOSIS — C25.9 MALIGNANT NEOPLASM OF PANCREAS, UNSPECIFIED: ICD-10-CM

## 2022-10-13 DIAGNOSIS — Z45.2 ENCOUNTER FOR ADJUSTMENT AND MANAGEMENT OF VASCULAR ACCESS DEVICE: Chronic | ICD-10-CM

## 2022-10-19 ENCOUNTER — RESULT REVIEW (OUTPATIENT)
Age: 47
End: 2022-10-19

## 2022-10-19 ENCOUNTER — APPOINTMENT (OUTPATIENT)
Dept: INFUSION THERAPY | Facility: CLINIC | Age: 47
End: 2022-10-19

## 2022-10-19 ENCOUNTER — APPOINTMENT (OUTPATIENT)
Dept: HEMATOLOGY ONCOLOGY | Facility: CLINIC | Age: 47
End: 2022-10-19

## 2022-10-19 VITALS
RESPIRATION RATE: 17 BRPM | OXYGEN SATURATION: 96 % | DIASTOLIC BLOOD PRESSURE: 76 MMHG | SYSTOLIC BLOOD PRESSURE: 131 MMHG | WEIGHT: 161.19 LBS | TEMPERATURE: 98.3 F | BODY MASS INDEX: 28.55 KG/M2 | HEART RATE: 72 BPM

## 2022-10-19 DIAGNOSIS — Z79.899 OTHER LONG TERM (CURRENT) DRUG THERAPY: ICD-10-CM

## 2022-10-19 LAB
ACANTHOCYTES BLD QL SMEAR: SLIGHT — SIGNIFICANT CHANGE UP
ALBUMIN SERPL ELPH-MCNC: 4.6 G/DL — SIGNIFICANT CHANGE UP (ref 3.3–5)
ALP SERPL-CCNC: 125 U/L — HIGH (ref 40–120)
ALT FLD-CCNC: 22 U/L — SIGNIFICANT CHANGE UP (ref 10–45)
ANION GAP SERPL CALC-SCNC: 11 MMOL/L — SIGNIFICANT CHANGE UP (ref 5–17)
ANISOCYTOSIS BLD QL: SLIGHT — SIGNIFICANT CHANGE UP
AST SERPL-CCNC: 29 U/L — SIGNIFICANT CHANGE UP (ref 10–40)
BASOPHILS # BLD AUTO: 0.1 K/UL — SIGNIFICANT CHANGE UP (ref 0–0.2)
BASOPHILS NFR BLD AUTO: 1 % — SIGNIFICANT CHANGE UP (ref 0–2)
BILIRUB SERPL-MCNC: 0.9 MG/DL — SIGNIFICANT CHANGE UP (ref 0.2–1.2)
BUN SERPL-MCNC: 16 MG/DL — SIGNIFICANT CHANGE UP (ref 7–23)
BURR CELLS BLD QL SMEAR: PRESENT — SIGNIFICANT CHANGE UP
CALCIUM SERPL-MCNC: 9.2 MG/DL — SIGNIFICANT CHANGE UP (ref 8.4–10.5)
CANCER AG19-9 SERPL-ACNC: 9 U/ML — SIGNIFICANT CHANGE UP
CHLORIDE SERPL-SCNC: 103 MMOL/L — SIGNIFICANT CHANGE UP (ref 96–108)
CO2 SERPL-SCNC: 25 MMOL/L — SIGNIFICANT CHANGE UP (ref 22–31)
CREAT SERPL-MCNC: 0.61 MG/DL — SIGNIFICANT CHANGE UP (ref 0.5–1.3)
EGFR: 113 ML/MIN/1.73M2 — SIGNIFICANT CHANGE UP
EOSINOPHIL # BLD AUTO: 2.57 K/UL — HIGH (ref 0–0.5)
EOSINOPHIL NFR BLD AUTO: 25 % — HIGH (ref 0–6)
GLUCOSE SERPL-MCNC: 86 MG/DL — SIGNIFICANT CHANGE UP (ref 70–99)
HCT VFR BLD CALC: 38.6 % — SIGNIFICANT CHANGE UP (ref 34.5–45)
HGB BLD-MCNC: 13 G/DL — SIGNIFICANT CHANGE UP (ref 11.5–15.5)
HYPOCHROMIA BLD QL: SLIGHT — SIGNIFICANT CHANGE UP
LG PLATELETS BLD QL AUTO: SLIGHT — SIGNIFICANT CHANGE UP
LYMPHOCYTES # BLD AUTO: 2.06 K/UL — SIGNIFICANT CHANGE UP (ref 1–3.3)
LYMPHOCYTES # BLD AUTO: 20 % — SIGNIFICANT CHANGE UP (ref 13–44)
MACROCYTES BLD QL: SLIGHT — SIGNIFICANT CHANGE UP
MCHC RBC-ENTMCNC: 31.4 PG — SIGNIFICANT CHANGE UP (ref 27–34)
MCHC RBC-ENTMCNC: 33.7 GM/DL — SIGNIFICANT CHANGE UP (ref 32–36)
MCV RBC AUTO: 93.2 FL — SIGNIFICANT CHANGE UP (ref 80–100)
MICROCYTES BLD QL: SLIGHT — SIGNIFICANT CHANGE UP
MONOCYTES # BLD AUTO: 1.13 K/UL — HIGH (ref 0–0.9)
MONOCYTES NFR BLD AUTO: 11 % — SIGNIFICANT CHANGE UP (ref 2–14)
NEUTROPHILS # BLD AUTO: 4.21 K/UL — SIGNIFICANT CHANGE UP (ref 1.8–7.4)
NEUTROPHILS NFR BLD AUTO: 40 % — LOW (ref 43–77)
NEUTS BAND # BLD: 1 % — SIGNIFICANT CHANGE UP (ref 0–8)
NRBC # BLD: 0 /100 — SIGNIFICANT CHANGE UP (ref 0–0)
NRBC # BLD: SIGNIFICANT CHANGE UP /100 WBCS (ref 0–0)
OVALOCYTES BLD QL SMEAR: SLIGHT — SIGNIFICANT CHANGE UP
PLAT MORPH BLD: NORMAL — SIGNIFICANT CHANGE UP
PLATELET # BLD AUTO: 464 K/UL — HIGH (ref 150–400)
POIKILOCYTOSIS BLD QL AUTO: SLIGHT — SIGNIFICANT CHANGE UP
POTASSIUM SERPL-MCNC: 4.8 MMOL/L — SIGNIFICANT CHANGE UP (ref 3.5–5.3)
POTASSIUM SERPL-SCNC: 4.8 MMOL/L — SIGNIFICANT CHANGE UP (ref 3.5–5.3)
PROT SERPL-MCNC: 7.3 G/DL — SIGNIFICANT CHANGE UP (ref 6–8.3)
RBC # BLD: 4.14 M/UL — SIGNIFICANT CHANGE UP (ref 3.8–5.2)
RBC # FLD: 17.2 % — HIGH (ref 10.3–14.5)
RBC BLD AUTO: ABNORMAL
SODIUM SERPL-SCNC: 139 MMOL/L — SIGNIFICANT CHANGE UP (ref 135–145)
STOMATOCYTES BLD QL SMEAR: SLIGHT — SIGNIFICANT CHANGE UP
TARGETS BLD QL SMEAR: SLIGHT — SIGNIFICANT CHANGE UP
VARIANT LYMPHS # BLD: 2 % — SIGNIFICANT CHANGE UP (ref 0–6)
WBC # BLD: 10.28 K/UL — SIGNIFICANT CHANGE UP (ref 3.8–10.5)
WBC # FLD AUTO: 10.28 K/UL — SIGNIFICANT CHANGE UP (ref 3.8–10.5)

## 2022-10-19 PROCEDURE — 99214 OFFICE O/P EST MOD 30 MIN: CPT

## 2022-10-25 DIAGNOSIS — Z15.01 GENETIC SUSCEPTIBILITY TO MALIGNANT NEOPLASM OF BREAST: ICD-10-CM

## 2022-10-25 PROBLEM — Z79.899 ON ANTINEOPLASTIC CHEMOTHERAPY: Status: RESOLVED | Noted: 2021-10-14 | Resolved: 2022-10-25

## 2022-10-25 NOTE — ASSESSMENT
[FreeTextEntry1] : 45 y/o female BRCA 2 mutation carrier  with clinical stage III  pancreatic adenocarcinoma. \par \par S/p 8 cycles of neodjuvant FOLFIRINOX. \par \par S/p  neoadjuvant RT concurrent capecitabine to  further improve resectability.\par \par  5/27/22 distal pancreatectomy, splenectomy L adrenalectomy , LN dissection. Complete pathologic response.\par \par Adjuvant chemotherapy - FOLFIRINOX x 4 more cycles for a total of 12 cycles of cristino ( pre and post) operative chemotherapy. Completed 8/30/22. \par \par Discussed follow up- monitoring. \par Will keep mediport for now.\par next scans  in 6 months \par Return visit in 3 months.\par \par BRCA 2 positive.\par Screening  MRI of breast ordered \par Also to see GYN Onc. \par \par \par \par

## 2022-10-25 NOTE — HISTORY OF PRESENT ILLNESS
[Disease: _____________________] : Disease: [unfilled] [T: ___] : T[unfilled] [N: ___] : N[unfilled] [AJCC Stage: ____] : AJCC Stage: [unfilled] [de-identified] : BOUCHRA PALACIOS is a 45 y.o. with a PMH significant for hypothyroidism, in Oct 2021 diagnosed with  a clinical stage III pancreatic cancer.\par \par ~8/2021 - Presented at age 45 with abdominal pains.  Saw PCP, referred to GI Dr. Maldonado. \par 9/30/21 - Abd US - 3.9 x 4.6 x 4.8cm heterogeneous mass in region of pancreatic tail. \par 10/7/21 - CT A/P - pancreatic tail mass with splenic artery encasement and splenic vein thrombosis ( tumor thrombus).  Borderline PALN's. \par 10/11/21 - 10/15/21 - Admit to  for severe pain.  Was set up for outpatient EGD with biopsy but had severe pains, nausea, dry-heaving and so went to ER. \par 10/11/21 - CT Chest - no evidence of disease.\par 10/11/21 - MRCP - tumor encases splenic artery, celiac artery contact <180, splenic vein occlusion.  Possible retroperitoneal extension with left adrenal gland contact and possible extension into left celiac ganglion.\par 10/12/21 - EUS, FNA pancreatic mass - scanty specimen - PATH - poorly differentiated carcinoma. \par 10/14/21 - Celiac block attempted - not successful - no window due to tumor overlying plexus. \par Case presented at tumor board - for NEOAdjuvant FOLFIRINOX up to 12 cycles, reassess after 4.  Consider chemoRT if suboptimal response. \par \par \par FOLFIRINOX x 8 10/19/21 - 2/15/21 \par \par CT CAP 2/15/22 ( post 8 cycles of  Folfirinox) further decrease in pancreatic mass, no new areas of disease.\par \par Neoadjuvant chemoRT to  further improve resectability .\par \par Completed dose  5000 cGy to pancreas  on 4/18/22 ( Dr Tobar) Concurrent capecitabine- stopped due to worsening neuropathies \par \par 5/27/22 Distal pancreatectomy, splenectomy, left adrenalectomy  : complete response, no residual cancer. 29 lymph nodes negative .  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 Eating well Weight  stable. No GI c/o. Neuropathy improved.

## 2022-10-25 NOTE — PHYSICAL EXAM
[Fully active, able to carry on all pre-disease performance without restriction] : Status 0 - Fully active, able to carry on all pre-disease performance without restriction [Normal] : affect appropriate [de-identified] : looks great  [de-identified] : mediport  [de-identified] :  midline surgical incision healed

## 2022-10-25 NOTE — REVIEW OF SYSTEMS
[Patient Intake Form Reviewed] : Patient intake form was reviewed [Negative] : Allergic/Immunologic [Fatigue] : no fatigue [FreeTextEntry7] : I  [de-identified] : residual mild neuropathy

## 2022-11-18 ENCOUNTER — RX ONLY (RX ONLY)
Age: 47
End: 2022-11-18

## 2022-11-18 ENCOUNTER — OFFICE (OUTPATIENT)
Dept: URBAN - METROPOLITAN AREA CLINIC 100 | Facility: CLINIC | Age: 47
Setting detail: OPHTHALMOLOGY
End: 2022-11-18
Payer: COMMERCIAL

## 2022-11-18 DIAGNOSIS — B30.9: ICD-10-CM

## 2022-11-18 PROCEDURE — 92002 INTRM OPH EXAM NEW PATIENT: CPT | Performed by: OPHTHALMOLOGY

## 2022-11-18 ASSESSMENT — REFRACTION_AUTOREFRACTION
OD_SPHERE: -3.00
OS_CYLINDER: -0.75
OS_SPHERE: -2.75
OD_AXIS: 142
OD_CYLINDER: -0.50
OS_AXIS: 089

## 2022-11-18 ASSESSMENT — AXIALLENGTH_DERIVED
OD_AL: 23.7109
OS_AL: 23.6618

## 2022-11-18 ASSESSMENT — KERATOMETRY
OD_K1POWER_DIOPTERS: 46.25
OD_AXISANGLE_DEGREES: 172
OS_AXISANGLE_DEGREES: 012
OS_K2POWER_DIOPTERS: 47.00
OD_K2POWER_DIOPTERS: 47.00
OS_K1POWER_DIOPTERS: 46.25

## 2022-11-18 ASSESSMENT — REFRACTION_CURRENTRX
OD_SPHERE: -2.25
OS_SPHERE: -2.25
OS_OVR_VA: 20/
OD_OVR_VA: 20/
OS_AXIS: 065
OD_CYLINDER: -0.25
OS_CYLINDER: -0.75
OS_VPRISM_DIRECTION: SV
OD_AXIS: 095
OD_VPRISM_DIRECTION: SV

## 2022-11-18 ASSESSMENT — CONFRONTATIONAL VISUAL FIELD TEST (CVF)
OS_FINDINGS: FULL
OD_FINDINGS: FULL

## 2022-11-18 ASSESSMENT — VISUAL ACUITY
OS_BCVA: 20/30-2
OD_BCVA: 20/25

## 2022-11-18 ASSESSMENT — TONOMETRY
OS_IOP_MMHG: 15
OD_IOP_MMHG: 15

## 2022-11-18 ASSESSMENT — SPHEQUIV_DERIVED
OS_SPHEQUIV: -3.125
OD_SPHEQUIV: -3.25

## 2022-11-30 ENCOUNTER — APPOINTMENT (OUTPATIENT)
Dept: INFUSION THERAPY | Facility: CLINIC | Age: 47
End: 2022-11-30

## 2022-11-30 VITALS
SYSTOLIC BLOOD PRESSURE: 113 MMHG | OXYGEN SATURATION: 96 % | HEART RATE: 81 BPM | DIASTOLIC BLOOD PRESSURE: 71 MMHG | RESPIRATION RATE: 18 BRPM | TEMPERATURE: 98 F

## 2022-12-01 NOTE — ASU PREOP CHECKLIST - ORDERS/MEDICATION ADMINISTRATION RECORD ON CHART
done Eucrisa Counseling: Patient may experience a mild burning sensation during topical application. Eucrisa is not approved in children less than 2 years of age.

## 2022-12-09 ENCOUNTER — OFFICE (OUTPATIENT)
Dept: URBAN - METROPOLITAN AREA CLINIC 102 | Facility: CLINIC | Age: 47
Setting detail: OPHTHALMOLOGY
End: 2022-12-09
Payer: COMMERCIAL

## 2022-12-09 DIAGNOSIS — H10.45: ICD-10-CM

## 2022-12-09 DIAGNOSIS — H10.503: ICD-10-CM

## 2022-12-09 PROBLEM — H52.7 REFRACTIVE ERROR: Status: ACTIVE | Noted: 2022-12-09

## 2022-12-09 PROCEDURE — 99213 OFFICE O/P EST LOW 20 MIN: CPT | Performed by: OPHTHALMOLOGY

## 2022-12-09 ASSESSMENT — REFRACTION_CURRENTRX
OD_AXIS: 095
OD_VPRISM_DIRECTION: SV
OS_VPRISM_DIRECTION: SV
OS_CYLINDER: -0.75
OD_CYLINDER: -0.25
OS_OVR_VA: 20/
OD_OVR_VA: 20/
OS_SPHERE: -2.25
OS_AXIS: 065
OD_SPHERE: -2.25

## 2022-12-09 ASSESSMENT — KERATOMETRY
METHOD_AUTO_MANUAL: AUTO
OS_AXISANGLE_DEGREES: 090
OS_K2POWER_DIOPTERS: 46.50
OD_K2POWER_DIOPTERS: 46.50
OD_AXISANGLE_DEGREES: 064
OD_K1POWER_DIOPTERS: 46.25
OS_K1POWER_DIOPTERS: 46.50

## 2022-12-09 ASSESSMENT — REFRACTION_AUTOREFRACTION
OS_AXIS: 083
OD_SPHERE: -2.75
OD_CYLINDER: -0.50
OS_CYLINDER: -0.75
OS_SPHERE: -3.00
OD_AXIS: 116

## 2022-12-09 ASSESSMENT — TONOMETRY
OD_IOP_MMHG: 13
OS_IOP_MMHG: 12

## 2022-12-09 ASSESSMENT — CONFRONTATIONAL VISUAL FIELD TEST (CVF)
OS_FINDINGS: FULL
OD_FINDINGS: FULL

## 2022-12-09 ASSESSMENT — LID EXAM ASSESSMENTS
OD_BLEPHARITIS: 1+ 2+
OS_BLEPHARITIS: 1+ 2+

## 2022-12-09 ASSESSMENT — VISUAL ACUITY
OS_BCVA: 20/25
OD_BCVA: 20/25-1

## 2022-12-09 ASSESSMENT — SPHEQUIV_DERIVED
OD_SPHEQUIV: -3
OS_SPHEQUIV: -3.375

## 2022-12-09 ASSESSMENT — AXIALLENGTH_DERIVED
OD_AL: 23.7052
OS_AL: 23.8068

## 2023-01-12 ENCOUNTER — OUTPATIENT (OUTPATIENT)
Dept: OUTPATIENT SERVICES | Facility: HOSPITAL | Age: 48
LOS: 1 days | End: 2023-01-12
Payer: COMMERCIAL

## 2023-01-12 ENCOUNTER — APPOINTMENT (OUTPATIENT)
Dept: MRI IMAGING | Facility: CLINIC | Age: 48
End: 2023-01-12
Payer: COMMERCIAL

## 2023-01-12 DIAGNOSIS — Z15.01 GENETIC SUSCEPTIBILITY TO MALIGNANT NEOPLASM OF BREAST: ICD-10-CM

## 2023-01-12 DIAGNOSIS — Z98.890 OTHER SPECIFIED POSTPROCEDURAL STATES: Chronic | ICD-10-CM

## 2023-01-12 DIAGNOSIS — Z00.8 ENCOUNTER FOR OTHER GENERAL EXAMINATION: ICD-10-CM

## 2023-01-12 DIAGNOSIS — Z45.2 ENCOUNTER FOR ADJUSTMENT AND MANAGEMENT OF VASCULAR ACCESS DEVICE: Chronic | ICD-10-CM

## 2023-01-12 PROCEDURE — A9585: CPT

## 2023-01-12 PROCEDURE — C8937: CPT

## 2023-01-12 PROCEDURE — C8908: CPT

## 2023-01-12 PROCEDURE — 77049 MRI BREAST C-+ W/CAD BI: CPT | Mod: 26

## 2023-01-15 ENCOUNTER — OUTPATIENT (OUTPATIENT)
Dept: OUTPATIENT SERVICES | Facility: HOSPITAL | Age: 48
LOS: 1 days | Discharge: HOME | End: 2023-01-15

## 2023-01-15 DIAGNOSIS — Z45.2 ENCOUNTER FOR ADJUSTMENT AND MANAGEMENT OF VASCULAR ACCESS DEVICE: Chronic | ICD-10-CM

## 2023-01-15 DIAGNOSIS — C25.9 MALIGNANT NEOPLASM OF PANCREAS, UNSPECIFIED: ICD-10-CM

## 2023-01-15 DIAGNOSIS — Z98.890 OTHER SPECIFIED POSTPROCEDURAL STATES: Chronic | ICD-10-CM

## 2023-01-18 ENCOUNTER — RESULT REVIEW (OUTPATIENT)
Age: 48
End: 2023-01-18

## 2023-01-18 ENCOUNTER — APPOINTMENT (OUTPATIENT)
Dept: INFUSION THERAPY | Facility: CLINIC | Age: 48
End: 2023-01-18
Payer: COMMERCIAL

## 2023-01-18 ENCOUNTER — APPOINTMENT (OUTPATIENT)
Dept: HEMATOLOGY ONCOLOGY | Facility: CLINIC | Age: 48
End: 2023-01-18
Payer: COMMERCIAL

## 2023-01-18 VITALS
HEART RATE: 88 BPM | OXYGEN SATURATION: 98 % | TEMPERATURE: 98 F | BODY MASS INDEX: 29.59 KG/M2 | RESPIRATION RATE: 18 BRPM | WEIGHT: 167 LBS | DIASTOLIC BLOOD PRESSURE: 80 MMHG | SYSTOLIC BLOOD PRESSURE: 118 MMHG | HEIGHT: 63 IN

## 2023-01-18 LAB
BASOPHILS # BLD AUTO: 0.09 K/UL — SIGNIFICANT CHANGE UP (ref 0–0.2)
BASOPHILS NFR BLD AUTO: 0.9 % — SIGNIFICANT CHANGE UP (ref 0–2)
CANCER AG19-9 SERPL-ACNC: 10 U/ML — SIGNIFICANT CHANGE UP
EOSINOPHIL # BLD AUTO: 0.39 K/UL — SIGNIFICANT CHANGE UP (ref 0–0.5)
EOSINOPHIL NFR BLD AUTO: 4.1 % — SIGNIFICANT CHANGE UP (ref 0–6)
HCT VFR BLD CALC: 39.2 % — SIGNIFICANT CHANGE UP (ref 34.5–45)
HGB BLD-MCNC: 13.3 G/DL — SIGNIFICANT CHANGE UP (ref 11.5–15.5)
IMM GRANULOCYTES NFR BLD AUTO: 0.1 % — SIGNIFICANT CHANGE UP (ref 0–0.9)
LYMPHOCYTES # BLD AUTO: 1.58 K/UL — SIGNIFICANT CHANGE UP (ref 1–3.3)
LYMPHOCYTES # BLD AUTO: 16.6 % — SIGNIFICANT CHANGE UP (ref 13–44)
MCHC RBC-ENTMCNC: 30.4 PG — SIGNIFICANT CHANGE UP (ref 27–34)
MCHC RBC-ENTMCNC: 33.9 GM/DL — SIGNIFICANT CHANGE UP (ref 32–36)
MCV RBC AUTO: 89.7 FL — SIGNIFICANT CHANGE UP (ref 80–100)
MONOCYTES # BLD AUTO: 1.18 K/UL — HIGH (ref 0–0.9)
MONOCYTES NFR BLD AUTO: 12.4 % — SIGNIFICANT CHANGE UP (ref 2–14)
NEUTROPHILS # BLD AUTO: 6.28 K/UL — SIGNIFICANT CHANGE UP (ref 1.8–7.4)
NEUTROPHILS NFR BLD AUTO: 65.9 % — SIGNIFICANT CHANGE UP (ref 43–77)
NRBC # BLD: 0 /100 WBCS — SIGNIFICANT CHANGE UP (ref 0–0)
PLATELET # BLD AUTO: 378 K/UL — SIGNIFICANT CHANGE UP (ref 150–400)
RBC # BLD: 4.37 M/UL — SIGNIFICANT CHANGE UP (ref 3.8–5.2)
RBC # FLD: 14.4 % — SIGNIFICANT CHANGE UP (ref 10.3–14.5)
WBC # BLD: 9.53 K/UL — SIGNIFICANT CHANGE UP (ref 3.8–10.5)
WBC # FLD AUTO: 9.53 K/UL — SIGNIFICANT CHANGE UP (ref 3.8–10.5)

## 2023-01-18 PROCEDURE — 99215 OFFICE O/P EST HI 40 MIN: CPT

## 2023-01-19 DIAGNOSIS — E86.0 DEHYDRATION: ICD-10-CM

## 2023-01-19 DIAGNOSIS — R59.0 LOCALIZED ENLARGED LYMPH NODES: ICD-10-CM

## 2023-01-19 DIAGNOSIS — Z15.01 GENETIC SUSCEPTIBILITY TO MALIGNANT NEOPLASM OF BREAST: ICD-10-CM

## 2023-01-19 LAB
ALBUMIN SERPL ELPH-MCNC: 4.3 G/DL — SIGNIFICANT CHANGE UP (ref 3.3–5)
ALP SERPL-CCNC: 94 U/L — SIGNIFICANT CHANGE UP (ref 40–120)
ALT FLD-CCNC: 27 U/L — SIGNIFICANT CHANGE UP (ref 10–45)
ANION GAP SERPL CALC-SCNC: 14 MMOL/L — SIGNIFICANT CHANGE UP (ref 5–17)
AST SERPL-CCNC: 27 U/L — SIGNIFICANT CHANGE UP (ref 10–40)
BILIRUB SERPL-MCNC: 0.6 MG/DL — SIGNIFICANT CHANGE UP (ref 0.2–1.2)
BUN SERPL-MCNC: 17 MG/DL — SIGNIFICANT CHANGE UP (ref 7–23)
CALCIUM SERPL-MCNC: 9.1 MG/DL — SIGNIFICANT CHANGE UP (ref 8.4–10.5)
CHLORIDE SERPL-SCNC: 103 MMOL/L — SIGNIFICANT CHANGE UP (ref 96–108)
CO2 SERPL-SCNC: 23 MMOL/L — SIGNIFICANT CHANGE UP (ref 22–31)
CREAT SERPL-MCNC: 0.73 MG/DL — SIGNIFICANT CHANGE UP (ref 0.5–1.3)
EGFR: 103 ML/MIN/1.73M2 — SIGNIFICANT CHANGE UP
GLUCOSE SERPL-MCNC: 112 MG/DL — HIGH (ref 70–99)
POTASSIUM SERPL-MCNC: 4.3 MMOL/L — SIGNIFICANT CHANGE UP (ref 3.5–5.3)
POTASSIUM SERPL-SCNC: 4.3 MMOL/L — SIGNIFICANT CHANGE UP (ref 3.5–5.3)
PROT SERPL-MCNC: 7.3 G/DL — SIGNIFICANT CHANGE UP (ref 6–8.3)
SODIUM SERPL-SCNC: 140 MMOL/L — SIGNIFICANT CHANGE UP (ref 135–145)

## 2023-01-19 NOTE — ASSESSMENT
[FreeTextEntry1] : 48 y/o female BRCA 2 mutation carrier  with clinical stage III  pancreatic adenocarcinoma. \par \par S/p 8 cycles of neodjuvant FOLFIRINOX. \par \par S/p  neoadjuvant RT concurrent capecitabine to  further improve resectability.\par \par  5/27/22 distal pancreatectomy, splenectomy L adrenalectomy , LN dissection. Complete pathologic response.\par \par Adjuvant chemotherapy - FOLFIRINOX x 4 more cycles for a total of 12 cycles of cristino ( pre and post) operative chemotherapy. Completed 8/30/22. \par \par Continue monitoring- exams, tumor marker and scans.  Next scans ( q 6 months) CT CAP due in February. \par Will keep mediport for now.\par \par \par BRCA 2 positive.\par Recent screening  MRI of breast showed R axillary adenopathy, no abnormal  findings in the breast.Breast exam is normal.\par Will get R axillary US-  likely will need US guided R axillary  LN biopsy. will d/w Dr Howell\par \par She also need to follow up with GYN Onc. \par \par Routine appointment in 6 weeks ( after scans) / sooner PRN\par \par \par \par 
7

## 2023-01-19 NOTE — PHYSICAL EXAM
[Fully active, able to carry on all pre-disease performance without restriction] : Status 0 - Fully active, able to carry on all pre-disease performance without restriction [Normal] : affect appropriate [de-identified] : looks well  [de-identified] : mediport  [de-identified] : no palpable masses in both breast. palpable soft movable R axillary node ; no left axillarya denopathy  [de-identified] :  midline surgical scar [de-identified] : R axillary LN

## 2023-01-19 NOTE — HISTORY OF PRESENT ILLNESS
[Disease: _____________________] : Disease: [unfilled] [T: ___] : T[unfilled] [N: ___] : N[unfilled] [AJCC Stage: ____] : AJCC Stage: [unfilled] [de-identified] : BOUCHRA PALACIOS is a 45 y.o. with a PMH significant for hypothyroidism, in Oct 2021 diagnosed with  a clinical stage III pancreatic cancer. Genetic testing - BRCA 2 positive.\par \par ~8/2021 - Presented at age 45 with abdominal pains.  Saw PCP, referred to GI Dr. Maldonado. \par 9/30/21 - Abd US - 3.9 x 4.6 x 4.8cm heterogeneous mass in region of pancreatic tail. \par 10/7/21 - CT A/P - pancreatic tail mass with splenic artery encasement and splenic vein thrombosis ( tumor thrombus).  Borderline PALN's. \par 10/11/21 - 10/15/21 - Admit to  for severe pain.  Was set up for outpatient EGD with biopsy but had severe pains, nausea, dry-heaving and so went to ER. \par 10/11/21 - CT Chest - no evidence of disease.\par 10/11/21 - MRCP - tumor encases splenic artery, celiac artery contact <180, splenic vein occlusion.  Possible retroperitoneal extension with left adrenal gland contact and possible extension into left celiac ganglion.\par 10/12/21 - EUS, FNA pancreatic mass - scanty specimen - PATH - poorly differentiated carcinoma. \par \par Neoadjuvant FOLFIRINOX x 8 10/19/21 - 2/15/21 \par \par CT CAP 2/15/22 ( post 8 cycles of  Folfirinox) further decrease in pancreatic mass, no new areas of disease.\par \par Neoadjuvant chemoRT to  further improve resectability .\par \par Completed dose  5000 cGy to pancreas  on 4/18/22 ( Dr Tobar) Concurrent capecitabine- stopped due to worsening neuropathies \par \par 5/27/22 Distal pancreatectomy, splenectomy, left adrenalectomy  : complete response, no residual cancer. 29 lymph nodes negative .  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 Back to full time work . Eating well Weight  stable. No GI c/o. Neuropathy minimal continues to improve.  \par Recently had screening breast MRI .

## 2023-01-19 NOTE — REVIEW OF SYSTEMS
[Patient Intake Form Reviewed] : Patient intake form was reviewed [Negative] : Allergic/Immunologic [Fatigue] : no fatigue [FreeTextEntry7] : I  [de-identified] : residual mild neuropathy improving

## 2023-01-19 NOTE — RESULTS/DATA
[FreeTextEntry1] : Breast MRI for high risk screening 1/12/23 : R axillary NIDIA 2.5 cm axillary node with cortical thickening- new from CT chest Sept 2022 No abnormal findings in the breast

## 2023-01-23 ENCOUNTER — OUTPATIENT (OUTPATIENT)
Dept: OUTPATIENT SERVICES | Facility: HOSPITAL | Age: 48
LOS: 1 days | End: 2023-01-23
Payer: COMMERCIAL

## 2023-01-23 ENCOUNTER — RESULT REVIEW (OUTPATIENT)
Age: 48
End: 2023-01-23

## 2023-01-23 ENCOUNTER — APPOINTMENT (OUTPATIENT)
Dept: ULTRASOUND IMAGING | Facility: CLINIC | Age: 48
End: 2023-01-23
Payer: COMMERCIAL

## 2023-01-23 DIAGNOSIS — Z98.890 OTHER SPECIFIED POSTPROCEDURAL STATES: Chronic | ICD-10-CM

## 2023-01-23 DIAGNOSIS — R59.0 LOCALIZED ENLARGED LYMPH NODES: ICD-10-CM

## 2023-01-23 DIAGNOSIS — Z45.2 ENCOUNTER FOR ADJUSTMENT AND MANAGEMENT OF VASCULAR ACCESS DEVICE: Chronic | ICD-10-CM

## 2023-01-23 PROCEDURE — 76642 ULTRASOUND BREAST LIMITED: CPT

## 2023-01-23 PROCEDURE — 76642 ULTRASOUND BREAST LIMITED: CPT | Mod: 26,RT

## 2023-01-26 ENCOUNTER — RESULT REVIEW (OUTPATIENT)
Age: 48
End: 2023-01-26

## 2023-01-26 ENCOUNTER — OUTPATIENT (OUTPATIENT)
Dept: OUTPATIENT SERVICES | Facility: HOSPITAL | Age: 48
LOS: 1 days | End: 2023-01-26
Payer: COMMERCIAL

## 2023-01-26 ENCOUNTER — APPOINTMENT (OUTPATIENT)
Dept: ULTRASOUND IMAGING | Facility: CLINIC | Age: 48
End: 2023-01-26
Payer: COMMERCIAL

## 2023-01-26 DIAGNOSIS — Z98.890 OTHER SPECIFIED POSTPROCEDURAL STATES: Chronic | ICD-10-CM

## 2023-01-26 DIAGNOSIS — Z15.01 GENETIC SUSCEPTIBILITY TO MALIGNANT NEOPLASM OF BREAST: ICD-10-CM

## 2023-01-26 DIAGNOSIS — Z45.2 ENCOUNTER FOR ADJUSTMENT AND MANAGEMENT OF VASCULAR ACCESS DEVICE: Chronic | ICD-10-CM

## 2023-01-26 PROCEDURE — 76942 ECHO GUIDE FOR BIOPSY: CPT | Mod: 26

## 2023-01-26 PROCEDURE — 76942 ECHO GUIDE FOR BIOPSY: CPT

## 2023-01-26 PROCEDURE — 77065 DX MAMMO INCL CAD UNI: CPT | Mod: 26,RT

## 2023-01-26 PROCEDURE — 38505 NEEDLE BIOPSY LYMPH NODES: CPT

## 2023-01-26 PROCEDURE — A4648: CPT

## 2023-01-26 PROCEDURE — 88305 TISSUE EXAM BY PATHOLOGIST: CPT | Mod: 26

## 2023-01-26 PROCEDURE — 77065 DX MAMMO INCL CAD UNI: CPT

## 2023-01-26 PROCEDURE — 38505 NEEDLE BIOPSY LYMPH NODES: CPT | Mod: RT

## 2023-01-26 PROCEDURE — 88305 TISSUE EXAM BY PATHOLOGIST: CPT

## 2023-01-31 ENCOUNTER — APPOINTMENT (OUTPATIENT)
Dept: GASTROENTEROLOGY | Facility: CLINIC | Age: 48
End: 2023-01-31
Payer: COMMERCIAL

## 2023-01-31 ENCOUNTER — APPOINTMENT (OUTPATIENT)
Dept: SURGICAL ONCOLOGY | Facility: CLINIC | Age: 48
End: 2023-01-31
Payer: COMMERCIAL

## 2023-01-31 ENCOUNTER — NON-APPOINTMENT (OUTPATIENT)
Age: 48
End: 2023-01-31

## 2023-01-31 VITALS
DIASTOLIC BLOOD PRESSURE: 93 MMHG | SYSTOLIC BLOOD PRESSURE: 125 MMHG | BODY MASS INDEX: 29.59 KG/M2 | HEIGHT: 63 IN | HEART RATE: 75 BPM | WEIGHT: 167 LBS

## 2023-01-31 VITALS
HEART RATE: 64 BPM | RESPIRATION RATE: 17 BRPM | DIASTOLIC BLOOD PRESSURE: 79 MMHG | OXYGEN SATURATION: 98 % | HEIGHT: 63 IN | WEIGHT: 167 LBS | BODY MASS INDEX: 29.59 KG/M2 | SYSTOLIC BLOOD PRESSURE: 122 MMHG | TEMPERATURE: 97.6 F

## 2023-01-31 DIAGNOSIS — R59.0 LOCALIZED ENLARGED LYMPH NODES: ICD-10-CM

## 2023-01-31 PROCEDURE — 99214 OFFICE O/P EST MOD 30 MIN: CPT

## 2023-01-31 PROCEDURE — 99213 OFFICE O/P EST LOW 20 MIN: CPT

## 2023-01-31 NOTE — HISTORY OF PRESENT ILLNESS
[FreeTextEntry1] : Ms. BOUCHRA PALACIOS is a 47 year old female with history of pancreatic cancer status post resection, adjuvant chemotherapy and radiation.  Patient had a very early stage pancreatic cancer resected and has completed a course of chemotherapy.  Clinically, patient feels well.  Patient is being evaluated for BRCA mutation and is considering prophylactic oophorectomy.  Patient never had prior screening:.  Patient has noted an improvement in her bowel movements since distal pancreatectomy surgery.\par

## 2023-01-31 NOTE — REASON FOR VISIT
[Consultation] : a consultation visit [FreeTextEntry1] : history of pancreatic cancer, history of BRCA2

## 2023-01-31 NOTE — ASSESSMENT
[FreeTextEntry1] : 48 yo female with history of early pancreatic cancer and BRCA 2 mutation.  Will arrange colonoscopy.

## 2023-01-31 NOTE — PHYSICAL EXAM

## 2023-02-01 PROBLEM — R59.0 AXILLARY ADENOPATHY: Status: ACTIVE | Noted: 2023-01-18

## 2023-02-01 NOTE — HISTORY OF PRESENT ILLNESS
[de-identified] : Ms. BOUCHRA PALACIOS  is a 47 year  old female  presenting for a follow up visit.\par She is s/p diagnostic lap, distal pancreatectomy, splenectomy, left adrenalectomy, . \par \par HOSPITAL COURSE @ [facility] 10/11/2021-10/15/2021: \par 45 year old female with a PMHx of hypothyroidism and depression presented to the [facility] ED on 10/11 for abdominal pain that had lasted for a month. Prior to presentation patient had been worked up by her gastroenterologist outpatient who had ordered an abdominal ultrasound. Ultrasound at that time showed a pancreatic mass. Subsequent CT abd/pelvis showed a pancreatic tail mass with splenic encasement. Patient was scheduled for a EGD with biopsy with Dr. Campbell however patient was in acute distress and presented to ED on 10/10. Gastroenterology was consulted and patient was seen by Dr. Campbell who performed a EUS FNB. 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 Hem/Onc was consulted and patient was evaluated with Dr. Mendez who did not recommend anti-coagulation because the thrombosis was a direct result of the tumor encasement. Patient was also seen by Dr. Howell Surgical Oncology who plans to resect the mass. Patient case was presented at a GI Tumour board and a 12 cycle course of Folfirinox prior to surgical resection was planned. CXR and CT Abdomen indicated no metastatic disease to the lungs or liver. During hospitalization a nerve block of the celiac plexus was attempted by Adrian but was unsuccessful due to risk of puncturing and seeding the pancreatic mass. Patient's pain was controlled via IV Dilaudid and a Fentanyl patch. Patient \par will continue to take PO Dilaudid and apply the fentanyl patch. Patient also underwent a procedure with Interventional Radiology to implant a chemotherapy port. Patient will see Dr. Mendez outpatient on 10/18/21 and will begin chemotherapy 10/19/21. \par \par Pancreas tail biopsy 10/11/2021- PATH: Poorly differentiated carcinoma\par \par Pts paternal aunt underwent genetic testing and was found to be BRCA 2+.  Pt. then decided to pursue genetic Testing in 2021 which revealed she is a BRCA 2+ gene carrier.  She met with Dr. Vera (Genetic counselor) and will undergo more extensive genetic testing. \par \par 10/18/21 - [facility] Comprehensive genetic panel - BRCA 2 ( heterogenous for pathogenic variant in BRCA 2) \par \par PMH: Depression, hypothyroidism, BRCA 2+, \par PSH:  EGD/EUS\par Social Hx: Never a smoker, social alcohol use\par \par INTERVAL HISTORY:\par 10/21/2021- Pt. was started on neoadjuvant chemotherapy with FOLFIRINOX on 10/19/2021.  Having nausea and diarrhea.  Also pain in back with poor pain control with narcotic medication.\par \par 21 Ca 19-9 28 U/mL\par \par CT C/A/P performed on 21 revealed Pancreatic tail mass decreased in size since 10/11/2021. Apparent new occlusion splenic artery with extensive splenic infarction. Encasement left gastric artery and celiac axis. Splenic vein occlusion.\par \par 21- Pt cont. on Folfirinox. Today she is feeling pretty well.  She has had some tough symptoms which have been managed appropriately.  She is overall holding up well.\par \par 2022- Pt. testing positive for COVID in 2021. On chemo under the care of Dr. Mendez.  Re-staging CT C/A/P performed on 2/15/2022 showed further decrease in size of the pancreatic tail carcinoma as described with focal abutment less than 180 degrees of proximal celiac artery and proximal common hepatic artery by ill-defined peripancreatic low-attenuation which could represent neurovascular extent of disease versus pancreatitis or treatment-related changes. No venous encasement involving superior mesenteric vein or main portal vein. Similar extent of infiltrative low-attenuation which abuts the left adrenal gland and left celiac ganglion. Acute colitis involving the ascending transverse and descending colon.  No imaging evidence of metastatic disease involving the chest, abdomen or pelvis. \par \par 3/14/2022- Started XRT x 5 weeks with Xeloda (nadeem/ Lourdes Tobar)\par \par 2022- Xeloda was stopped due to neuropathies\par \par 22- XRT completed \par \par 5/10/22- Received 3 vaccines in preparation for splenectomy (Bexsero, Menactra, Hib)\par \par 2022- CT A/P - infiltrative pancreatic tail mass minimally enlarged since 2/15/22 with occlusion of splenic artery and vein and partial encasement celiac axis and left hepatic artery as previously demonstrated.  Size: Approximately 2.7 x 1.7 cm, previously 2.3 x 1.6 cm. \par \par 22- Here to discuss recent CT.  Scheduled for distal pancreatectomy and splenectomy on 22. \par \par ***SURGERY 22: Diagnostic, laparoscopy, distal pancreatectomy, splenectomy, left adrenalectomy. No metastases on diagnostic lap.\par *** PATHOLOGY:\par Pancreas, spleen and left adrenal gland, distal pancreatectomy,\par splenectomy, and left adrenalectomy\par - Negative for residual carcinoma (Complete response, score 0)\par - Twenty-nine lymph nodes negative for tumor (0/29)\par - Pathologic stage (AJCC 8th Ed.) [id number]\par \par 6/15/2022: No post op complications. Incision healing without evidence of infection. Denies fever or chills. No complaints of abdominal pain, nausea, vomiting, or bowel habits. Adequate appetite. Pt has seen Med/Onc - MD Mendez on 6/10- she is to complete 4 more rounds of adjuvant chemo (FOLFIRINOX) and will restart in 6 weeks post surgery. \par \par 2022- She is healing well, scar well approximated, she reports postprandial pains if eating larger portions. She has good appetite. No other abdominal discomfort. Denies N/V/D. \par \par 22- She finished chemotherapy 2022, still experiencing symptoms related to the treatment: N/D no vomiting. Neuropathy present in feet mostly and somewhat hands but improving.\par \par CT chest/abdomen/pelvis 22- RITESH\par \par CA 19-9 drawn 23: 10\par \par Breast MRI 23- right axillary adenopathy w/ cortical thickening, new since CT 22 (BIRADS 4b).\par \par Right breast ultrasound performed 23 revealed an abnormal right axillary LN corresponding with a LN noted on MRI (BIRADS 4a). She is status post  biopsy on 23 demonstrated fragments of benign reactive lymph node. *Benign and concordant, recommend follow up ultrasound in one year.\par \par 23- Returns to discuss biopsy findings.  She denies any palpable breast mass, nipple discharge, inversion or skin change.  She would like to schedule an consult with gyn. oncology as she is ready to proceed with a risk reduction BSO.  She is not ready to undergo risk reduction mastectomies at this time and prefers to continue close observation with mammogram/sonogram and breast MRI.  She has a colonoscopy scheduled in May 2023 and a dermatology evaluation scheduled in 2023.

## 2023-02-01 NOTE — CONSULT LETTER
[Dear  ___] : Dear  [unfilled], [Courtesy Letter:] : I had the pleasure of seeing your patient, [unfilled], in my office today. [Consult Closing:] : Thank you very much for allowing me to participate in the care of this patient.  If you have any questions, please do not hesitate to contact me. [Sincerely,] : Sincerely, [FreeTextEntry3] : Juni Howell MD, MPH, FACS, FSSO\par , [facility]\par [facility] [place]\par Associate Professor of Surgery\par [employer] [facility]  [DrVidhi  ___] : Dr. MENDEZ [DrVidhi ___] : Dr. MENDEZ

## 2023-02-07 ENCOUNTER — APPOINTMENT (OUTPATIENT)
Dept: GYNECOLOGIC ONCOLOGY | Facility: CLINIC | Age: 48
End: 2023-02-07
Payer: COMMERCIAL

## 2023-02-07 VITALS
SYSTOLIC BLOOD PRESSURE: 124 MMHG | DIASTOLIC BLOOD PRESSURE: 84 MMHG | HEART RATE: 67 BPM | BODY MASS INDEX: 29.23 KG/M2 | WEIGHT: 165 LBS | HEIGHT: 63 IN

## 2023-02-07 DIAGNOSIS — Z76.89 PERSONS ENCOUNTERING HEALTH SERVICES IN OTHER SPECIFIED CIRCUMSTANCES: ICD-10-CM

## 2023-02-07 PROCEDURE — 99204 OFFICE O/P NEW MOD 45 MIN: CPT

## 2023-02-07 NOTE — OB HISTORY
[Total Preg ___] : : [unfilled] [Living ___] : [unfilled] (living) [Menarche Age ____] : age at menarche was [unfilled] [Menopause  Age ____] : menopause occurred at age [unfilled]

## 2023-02-13 ENCOUNTER — OUTPATIENT (OUTPATIENT)
Dept: OUTPATIENT SERVICES | Facility: HOSPITAL | Age: 48
LOS: 1 days | End: 2023-02-13
Payer: COMMERCIAL

## 2023-02-13 ENCOUNTER — APPOINTMENT (OUTPATIENT)
Dept: ULTRASOUND IMAGING | Facility: CLINIC | Age: 48
End: 2023-02-13
Payer: COMMERCIAL

## 2023-02-13 DIAGNOSIS — Z00.8 ENCOUNTER FOR OTHER GENERAL EXAMINATION: ICD-10-CM

## 2023-02-13 DIAGNOSIS — Z98.890 OTHER SPECIFIED POSTPROCEDURAL STATES: Chronic | ICD-10-CM

## 2023-02-13 DIAGNOSIS — Z45.2 ENCOUNTER FOR ADJUSTMENT AND MANAGEMENT OF VASCULAR ACCESS DEVICE: Chronic | ICD-10-CM

## 2023-02-13 PROCEDURE — 76830 TRANSVAGINAL US NON-OB: CPT

## 2023-02-13 PROCEDURE — 76830 TRANSVAGINAL US NON-OB: CPT | Mod: 26

## 2023-02-13 PROCEDURE — 76856 US EXAM PELVIC COMPLETE: CPT

## 2023-02-13 PROCEDURE — 76856 US EXAM PELVIC COMPLETE: CPT | Mod: 26

## 2023-02-13 PROCEDURE — 76536 US EXAM OF HEAD AND NECK: CPT

## 2023-02-13 PROCEDURE — 76536 US EXAM OF HEAD AND NECK: CPT | Mod: 26

## 2023-02-14 ENCOUNTER — NON-APPOINTMENT (OUTPATIENT)
Age: 48
End: 2023-02-14

## 2023-02-21 ENCOUNTER — OUTPATIENT (OUTPATIENT)
Dept: OUTPATIENT SERVICES | Facility: HOSPITAL | Age: 48
LOS: 1 days | End: 2023-02-21
Payer: COMMERCIAL

## 2023-02-21 VITALS
TEMPERATURE: 98 F | OXYGEN SATURATION: 99 % | RESPIRATION RATE: 16 BRPM | DIASTOLIC BLOOD PRESSURE: 82 MMHG | SYSTOLIC BLOOD PRESSURE: 121 MMHG | HEIGHT: 63 IN | HEART RATE: 65 BPM | WEIGHT: 166.89 LBS

## 2023-02-21 DIAGNOSIS — Z98.890 OTHER SPECIFIED POSTPROCEDURAL STATES: Chronic | ICD-10-CM

## 2023-02-21 DIAGNOSIS — Z15.01 GENETIC SUSCEPTIBILITY TO MALIGNANT NEOPLASM OF BREAST: ICD-10-CM

## 2023-02-21 DIAGNOSIS — Z45.2 ENCOUNTER FOR ADJUSTMENT AND MANAGEMENT OF VASCULAR ACCESS DEVICE: Chronic | ICD-10-CM

## 2023-02-21 DIAGNOSIS — E03.9 HYPOTHYROIDISM, UNSPECIFIED: ICD-10-CM

## 2023-02-21 DIAGNOSIS — F41.9 ANXIETY DISORDER, UNSPECIFIED: ICD-10-CM

## 2023-02-21 LAB
A1C WITH ESTIMATED AVERAGE GLUCOSE RESULT: 5.7 % — HIGH (ref 4–5.6)
ALBUMIN SERPL ELPH-MCNC: 4.5 G/DL — SIGNIFICANT CHANGE UP (ref 3.3–5)
ALP SERPL-CCNC: 92 U/L — SIGNIFICANT CHANGE UP (ref 40–120)
ALT FLD-CCNC: 21 U/L — SIGNIFICANT CHANGE UP (ref 4–33)
ANION GAP SERPL CALC-SCNC: 13 MMOL/L — SIGNIFICANT CHANGE UP (ref 7–14)
AST SERPL-CCNC: 23 U/L — SIGNIFICANT CHANGE UP (ref 4–32)
BILIRUB SERPL-MCNC: 1 MG/DL — SIGNIFICANT CHANGE UP (ref 0.2–1.2)
BLD GP AB SCN SERPL QL: NEGATIVE — SIGNIFICANT CHANGE UP
BUN SERPL-MCNC: 15 MG/DL — SIGNIFICANT CHANGE UP (ref 7–23)
CALCIUM SERPL-MCNC: 9.8 MG/DL — SIGNIFICANT CHANGE UP (ref 8.4–10.5)
CHLORIDE SERPL-SCNC: 101 MMOL/L — SIGNIFICANT CHANGE UP (ref 98–107)
CO2 SERPL-SCNC: 27 MMOL/L — SIGNIFICANT CHANGE UP (ref 22–31)
CREAT SERPL-MCNC: 0.73 MG/DL — SIGNIFICANT CHANGE UP (ref 0.5–1.3)
EGFR: 103 ML/MIN/1.73M2 — SIGNIFICANT CHANGE UP
ESTIMATED AVERAGE GLUCOSE: 117 — SIGNIFICANT CHANGE UP
GLUCOSE SERPL-MCNC: 61 MG/DL — LOW (ref 70–99)
HCT VFR BLD CALC: 44.5 % — SIGNIFICANT CHANGE UP (ref 34.5–45)
HGB BLD-MCNC: 14.2 G/DL — SIGNIFICANT CHANGE UP (ref 11.5–15.5)
MCHC RBC-ENTMCNC: 29.5 PG — SIGNIFICANT CHANGE UP (ref 27–34)
MCHC RBC-ENTMCNC: 31.9 GM/DL — LOW (ref 32–36)
MCV RBC AUTO: 92.3 FL — SIGNIFICANT CHANGE UP (ref 80–100)
NRBC # BLD: 0 /100 WBCS — SIGNIFICANT CHANGE UP (ref 0–0)
NRBC # FLD: 0 K/UL — SIGNIFICANT CHANGE UP (ref 0–0)
PLATELET # BLD AUTO: 515 K/UL — HIGH (ref 150–400)
POTASSIUM SERPL-MCNC: 4.1 MMOL/L — SIGNIFICANT CHANGE UP (ref 3.5–5.3)
POTASSIUM SERPL-SCNC: 4.1 MMOL/L — SIGNIFICANT CHANGE UP (ref 3.5–5.3)
PROT SERPL-MCNC: 7.9 G/DL — SIGNIFICANT CHANGE UP (ref 6–8.3)
RBC # BLD: 4.82 M/UL — SIGNIFICANT CHANGE UP (ref 3.8–5.2)
RBC # FLD: 14.7 % — HIGH (ref 10.3–14.5)
RH IG SCN BLD-IMP: POSITIVE — SIGNIFICANT CHANGE UP
SODIUM SERPL-SCNC: 141 MMOL/L — SIGNIFICANT CHANGE UP (ref 135–145)
WBC # BLD: 9.78 K/UL — SIGNIFICANT CHANGE UP (ref 3.8–10.5)
WBC # FLD AUTO: 9.78 K/UL — SIGNIFICANT CHANGE UP (ref 3.8–10.5)

## 2023-02-21 PROCEDURE — 93010 ELECTROCARDIOGRAM REPORT: CPT

## 2023-02-21 RX ORDER — SODIUM CHLORIDE 9 MG/ML
1000 INJECTION, SOLUTION INTRAVENOUS
Refills: 0 | Status: DISCONTINUED | OUTPATIENT
Start: 2023-03-06 | End: 2023-03-20

## 2023-02-21 RX ORDER — LORATADINE 10 MG/1
1 TABLET ORAL
Qty: 0 | Refills: 0 | DISCHARGE

## 2023-02-21 RX ORDER — OMEPRAZOLE 10 MG/1
1 CAPSULE, DELAYED RELEASE ORAL
Qty: 0 | Refills: 0 | DISCHARGE

## 2023-02-21 RX ORDER — LEVOTHYROXINE SODIUM 125 MCG
1 TABLET ORAL
Qty: 0 | Refills: 0 | DISCHARGE

## 2023-02-21 RX ORDER — ALPRAZOLAM 0.25 MG
1 TABLET ORAL
Qty: 0 | Refills: 0 | DISCHARGE

## 2023-02-21 RX ORDER — ESCITALOPRAM OXALATE 10 MG/1
15 TABLET, FILM COATED ORAL
Qty: 0 | Refills: 0 | DISCHARGE

## 2023-02-21 RX ORDER — CHLORHEXIDINE GLUCONATE 213 G/1000ML
1 SOLUTION TOPICAL DAILY
Refills: 0 | Status: DISCONTINUED | OUTPATIENT
Start: 2023-03-06 | End: 2023-03-20

## 2023-02-21 NOTE — H&P PST ADULT - HISTORY OF PRESENT ILLNESS
48 y/o Female with hx of pa BRCA 2 Mutation ,pancreatic cancer s/p laparoscopy distal pancreatectomy splenectomy possible left adrenalectomy, treated with chemotherapy and radiation. presents to presurgical testing scheduled for robotic bilateral salpingo oophorectomy , dilation and curettage, removal of IUD , possible hysterectomy staging laparotomy  46 y/o Female with hx of + BRCA 2 Mutation ,pancreatic cancer s/p laparoscopy distal pancreatectomy splenectomy possible left adrenalectomy, treated with chemotherapy and radiation. presents to presurgical testing scheduled for prophylactic  robotic bilateral salpingo oophorectomy , dilation and curettage, removal of IUD , possible hysterectomy staging laparotomy

## 2023-02-21 NOTE — H&P PST ADULT - PROBLEM SELECTOR PLAN 1
Patient tentatively scheduled for     Pre-op instructions provided. Pt given verbal and written instructions with teach back on chlorhexidine shampoo and Pepcid. Pt verbalized understanding with return demonstration.    Pt has a scheduled preop COVID test.

## 2023-02-21 NOTE — H&P PST ADULT - PROBLEM SELECTOR PLAN 3
Patient instructed to take with a sip of water on the morning of procedure. Patient instructed to take Lexapro, xanax with a sip of water on the morning of procedure.

## 2023-02-21 NOTE — H&P PST ADULT - NSICDXPASTSURGICALHX_GEN_ALL_CORE_FT
PAST SURGICAL HISTORY:  Encounter for venous access device care right chest    H/O endoscopy     History of pancreatic surgery

## 2023-02-21 NOTE — H&P PST ADULT - PROBLEM SELECTOR PLAN 2
Patient instructed to take with a sip of water on the morning of procedure. Patient instructed to take levothyroxine with a sip of water on the morning of procedure.

## 2023-02-21 NOTE — H&P PST ADULT - REASON FOR ADMISSION
" bilateral tubes removal, D & C, IUD removal" " bilateral tubes and ovaries removal, D & C, IUD removal"

## 2023-02-24 ENCOUNTER — OUTPATIENT (OUTPATIENT)
Dept: OUTPATIENT SERVICES | Facility: HOSPITAL | Age: 48
LOS: 1 days | End: 2023-02-24
Payer: COMMERCIAL

## 2023-02-24 ENCOUNTER — APPOINTMENT (OUTPATIENT)
Dept: CT IMAGING | Facility: CLINIC | Age: 48
End: 2023-02-24
Payer: COMMERCIAL

## 2023-02-24 DIAGNOSIS — Z98.890 OTHER SPECIFIED POSTPROCEDURAL STATES: Chronic | ICD-10-CM

## 2023-02-24 DIAGNOSIS — C25.9 MALIGNANT NEOPLASM OF PANCREAS, UNSPECIFIED: ICD-10-CM

## 2023-02-24 DIAGNOSIS — Z45.2 ENCOUNTER FOR ADJUSTMENT AND MANAGEMENT OF VASCULAR ACCESS DEVICE: Chronic | ICD-10-CM

## 2023-02-24 PROCEDURE — 74177 CT ABD & PELVIS W/CONTRAST: CPT | Mod: 26

## 2023-02-24 PROCEDURE — 74177 CT ABD & PELVIS W/CONTRAST: CPT

## 2023-02-24 PROCEDURE — 71260 CT THORAX DX C+: CPT | Mod: 26

## 2023-02-24 PROCEDURE — 71260 CT THORAX DX C+: CPT

## 2023-03-03 ENCOUNTER — APPOINTMENT (OUTPATIENT)
Dept: HEMATOLOGY ONCOLOGY | Facility: CLINIC | Age: 48
End: 2023-03-03
Payer: COMMERCIAL

## 2023-03-03 ENCOUNTER — APPOINTMENT (OUTPATIENT)
Dept: INFUSION THERAPY | Facility: CLINIC | Age: 48
End: 2023-03-03
Payer: COMMERCIAL

## 2023-03-03 ENCOUNTER — NON-APPOINTMENT (OUTPATIENT)
Age: 48
End: 2023-03-03

## 2023-03-03 VITALS
TEMPERATURE: 98.3 F | HEIGHT: 63 IN | DIASTOLIC BLOOD PRESSURE: 79 MMHG | BODY MASS INDEX: 29.59 KG/M2 | SYSTOLIC BLOOD PRESSURE: 119 MMHG | WEIGHT: 167 LBS | HEART RATE: 78 BPM | OXYGEN SATURATION: 96 % | RESPIRATION RATE: 18 BRPM

## 2023-03-03 DIAGNOSIS — Z01.818 ENCOUNTER FOR OTHER PREPROCEDURAL EXAMINATION: ICD-10-CM

## 2023-03-03 PROCEDURE — 99214 OFFICE O/P EST MOD 30 MIN: CPT

## 2023-03-03 NOTE — ASU PATIENT PROFILE, ADULT - FALL HARM RISK - UNIVERSAL INTERVENTIONS
Bed in lowest position, wheels locked, appropriate side rails in place/Call bell, personal items and telephone in reach/Instruct patient to call for assistance before getting out of bed or chair/Non-slip footwear when patient is out of bed/[place] to call system/Physically safe environment - no spills, clutter or unnecessary equipment/Purposeful Proactive Rounding/Room/bathroom lighting operational, light cord in reach

## 2023-03-03 NOTE — ASSESSMENT
[FreeTextEntry1] : 46 y/o female BRCA 2 mutation carrier  with clinical stage III  pancreatic adenocarcinoma. \par \par S/p 8 cycles of neodjuvant FOLFIRINOX. \par \par S/p  neoadjuvant RT concurrent capecitabine to  further improve resectability.\par \par  5/27/22 distal pancreatectomy, splenectomy L adrenalectomy , LN dissection. Complete pathologic response.\par \par Adjuvant chemotherapy - FOLFIRINOX x 4 more cycles for a total of 12 cycles of cristino ( pre and post) operative chemotherapy. Completed 8/30/22. \par \par Continue monitoring- exams, tumor marker and scans.  Next scans ( q 6 months) CT CAP due in August 2023. \par Will keep mediport for now.\par \par \par BRCA 2 positive.\par Considering prophylactic mastectomies in future ( does not feel ready for surgery yet)\par Prophylactic laparoscopic BSO scheduled for 3/6/23.\par \par Scheduled for routine colonoscopy . Also follows with dermatology for skin exam. \par \par \par \par Routine appointment in 6 months ( after scans) / sooner PRN\par \par \par \par

## 2023-03-03 NOTE — REVIEW OF SYSTEMS
[Patient Intake Form Reviewed] : Patient intake form was reviewed [Fatigue] : no fatigue [Negative] : Neurological [FreeTextEntry7] : I

## 2023-03-03 NOTE — HISTORY OF PRESENT ILLNESS
[Disease: _____________________] : Disease: [unfilled] [T: ___] : T[unfilled] [N: ___] : N[unfilled] [AJCC Stage: ____] : AJCC Stage: [unfilled] [de-identified] : BOUCHRA PALACIOS is a 45 y.o. with a PMH significant for hypothyroidism, in Oct 2021 diagnosed with  a clinical stage III pancreatic cancer. Genetic testing - BRCA 2 positive.\par \par ~8/2021 - Presented at age 45 with abdominal pains.  Saw PCP, referred to GI Dr. Maldonado. \par 9/30/21 - Abd US - 3.9 x 4.6 x 4.8cm heterogeneous mass in region of pancreatic tail. \par 10/7/21 - CT A/P - pancreatic tail mass with splenic artery encasement and splenic vein thrombosis ( tumor thrombus).  Borderline PALN's. \par 10/11/21 - 10/15/21 - Admit to  for severe pain.  Was set up for outpatient EGD with biopsy but had severe pains, nausea, dry-heaving and so went to ER. \par 10/11/21 - CT Chest - no evidence of disease.\par 10/11/21 - MRCP - tumor encases splenic artery, celiac artery contact <180, splenic vein occlusion.  Possible retroperitoneal extension with left adrenal gland contact and possible extension into left celiac ganglion.\par 10/12/21 - EUS, FNA pancreatic mass - scanty specimen - PATH - poorly differentiated carcinoma. \par \par Neoadjuvant FOLFIRINOX x 8 10/19/21 - 2/15/21 \par \par CT CAP 2/15/22 ( post 8 cycles of  Folfirinox) further decrease in pancreatic mass, no new areas of disease.\par \par Neoadjuvant chemoRT to  further improve resectability .\par \par Completed dose  5000 cGy to pancreas  on 4/18/22 ( Dr Tobar) Concurrent capecitabine- stopped due to worsening neuropathies \par \par 5/27/22 Distal pancreatectomy, splenectomy, left adrenalectomy  : complete response, no residual cancer. 29 lymph nodes negative .  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  Good energy. Working. Weight is good/ stable. No GI c/o

## 2023-03-03 NOTE — PHYSICAL EXAM
[Fully active, able to carry on all pre-disease performance without restriction] : Status 0 - Fully active, able to carry on all pre-disease performance without restriction [Normal] : affect appropriate [de-identified] : looks well  [de-identified] : mediport  [de-identified] :  midline surgical scar

## 2023-03-05 ENCOUNTER — TRANSCRIPTION ENCOUNTER (OUTPATIENT)
Age: 48
End: 2023-03-05

## 2023-03-06 ENCOUNTER — OUTPATIENT (OUTPATIENT)
Dept: OUTPATIENT SERVICES | Facility: HOSPITAL | Age: 48
LOS: 1 days | Discharge: HOME | End: 2023-03-06
Payer: COMMERCIAL

## 2023-03-06 ENCOUNTER — RESULT REVIEW (OUTPATIENT)
Age: 48
End: 2023-03-06

## 2023-03-06 ENCOUNTER — TRANSCRIPTION ENCOUNTER (OUTPATIENT)
Age: 48
End: 2023-03-06

## 2023-03-06 ENCOUNTER — APPOINTMENT (OUTPATIENT)
Dept: GYNECOLOGIC ONCOLOGY | Facility: HOSPITAL | Age: 48
End: 2023-03-06

## 2023-03-06 VITALS
TEMPERATURE: 98 F | OXYGEN SATURATION: 97 % | SYSTOLIC BLOOD PRESSURE: 104 MMHG | HEART RATE: 67 BPM | HEIGHT: 63 IN | RESPIRATION RATE: 16 BRPM | DIASTOLIC BLOOD PRESSURE: 69 MMHG | WEIGHT: 166.89 LBS

## 2023-03-06 VITALS
SYSTOLIC BLOOD PRESSURE: 92 MMHG | RESPIRATION RATE: 15 BRPM | HEART RATE: 84 BPM | DIASTOLIC BLOOD PRESSURE: 61 MMHG | OXYGEN SATURATION: 100 %

## 2023-03-06 DIAGNOSIS — Z45.2 ENCOUNTER FOR ADJUSTMENT AND MANAGEMENT OF VASCULAR ACCESS DEVICE: Chronic | ICD-10-CM

## 2023-03-06 DIAGNOSIS — Z98.890 OTHER SPECIFIED POSTPROCEDURAL STATES: Chronic | ICD-10-CM

## 2023-03-06 DIAGNOSIS — Z15.01 GENETIC SUSCEPTIBILITY TO MALIGNANT NEOPLASM OF BREAST: ICD-10-CM

## 2023-03-06 LAB
GLUCOSE BLDC GLUCOMTR-MCNC: 101 MG/DL — HIGH (ref 70–99)
HCG UR QL: NEGATIVE — SIGNIFICANT CHANGE UP
SARS-COV-2 N GENE NPH QL NAA+PROBE: NOT DETECTED

## 2023-03-06 PROCEDURE — 58120 DILATION AND CURETTAGE: CPT

## 2023-03-06 PROCEDURE — 58301 REMOVE INTRAUTERINE DEVICE: CPT

## 2023-03-06 PROCEDURE — 88331 PATH CONSLTJ SURG 1 BLK 1SPC: CPT | Mod: 26

## 2023-03-06 PROCEDURE — 88305 TISSUE EXAM BY PATHOLOGIST: CPT | Mod: 26

## 2023-03-06 PROCEDURE — 88112 CYTOPATH CELL ENHANCE TECH: CPT | Mod: 26

## 2023-03-06 PROCEDURE — 88300 SURGICAL PATH GROSS: CPT | Mod: 26,59

## 2023-03-06 PROCEDURE — S2900 ROBOTIC SURGICAL SYSTEM: CPT | Mod: NC

## 2023-03-06 PROCEDURE — 88332 PATH CONSLTJ SURG EA ADD BLK: CPT | Mod: 26

## 2023-03-06 PROCEDURE — 58661 LAPAROSCOPY REMOVE ADNEXA: CPT

## 2023-03-06 RX ORDER — IBUPROFEN 200 MG
1 TABLET ORAL
Qty: 0 | Refills: 0 | DISCHARGE

## 2023-03-06 RX ORDER — ONDANSETRON 8 MG/1
4 TABLET, FILM COATED ORAL ONCE
Refills: 0 | Status: COMPLETED | OUTPATIENT
Start: 2023-03-06 | End: 2023-03-06

## 2023-03-06 RX ORDER — SODIUM CHLORIDE 9 MG/ML
1000 INJECTION, SOLUTION INTRAVENOUS
Refills: 0 | Status: DISCONTINUED | OUTPATIENT
Start: 2023-03-06 | End: 2023-03-20

## 2023-03-06 RX ORDER — HYDROMORPHONE HYDROCHLORIDE 2 MG/ML
0.5 INJECTION INTRAMUSCULAR; INTRAVENOUS; SUBCUTANEOUS
Refills: 0 | Status: DISCONTINUED | OUTPATIENT
Start: 2023-03-06 | End: 2023-03-06

## 2023-03-06 RX ORDER — OXYCODONE HYDROCHLORIDE 5 MG/1
1 TABLET ORAL
Qty: 5 | Refills: 0
Start: 2023-03-06

## 2023-03-06 RX ORDER — METOCLOPRAMIDE HCL 10 MG
10 TABLET ORAL ONCE
Refills: 0 | Status: COMPLETED | OUTPATIENT
Start: 2023-03-06 | End: 2023-03-06

## 2023-03-06 RX ORDER — ACETAMINOPHEN 500 MG
2 TABLET ORAL
Qty: 0 | Refills: 0 | DISCHARGE

## 2023-03-06 RX ADMIN — HYDROMORPHONE HYDROCHLORIDE 0.5 MILLIGRAM(S): 2 INJECTION INTRAMUSCULAR; INTRAVENOUS; SUBCUTANEOUS at 12:22

## 2023-03-06 RX ADMIN — HYDROMORPHONE HYDROCHLORIDE 0.5 MILLIGRAM(S): 2 INJECTION INTRAMUSCULAR; INTRAVENOUS; SUBCUTANEOUS at 15:25

## 2023-03-06 RX ADMIN — Medication 10 MILLIGRAM(S): at 12:23

## 2023-03-06 RX ADMIN — HYDROMORPHONE HYDROCHLORIDE 0.5 MILLIGRAM(S): 2 INJECTION INTRAMUSCULAR; INTRAVENOUS; SUBCUTANEOUS at 12:37

## 2023-03-06 RX ADMIN — SODIUM CHLORIDE 125 MILLILITER(S): 9 INJECTION, SOLUTION INTRAVENOUS at 12:23

## 2023-03-06 RX ADMIN — ONDANSETRON 4 MILLIGRAM(S): 8 TABLET, FILM COATED ORAL at 12:17

## 2023-03-06 RX ADMIN — HYDROMORPHONE HYDROCHLORIDE 0.5 MILLIGRAM(S): 2 INJECTION INTRAMUSCULAR; INTRAVENOUS; SUBCUTANEOUS at 15:08

## 2023-03-06 NOTE — ASU DISCHARGE PLAN (ADULT/PEDIATRIC) - NURSING INSTRUCTIONS
DO NOT take any Tylenol (Acetaminophen) or narcotics containing Tylenol until after 4:30pm ______ . You received Tylenol during your operation and it can cause damage to your liver if too much is taken within a 24 hour time period.     DO NOT take any Ibuprofen ,Advil or Aleeve until after  4:30PM . You received Toradol during your operation and it can cause damage if too much is taken within a 24 hour time period.

## 2023-03-06 NOTE — BRIEF OPERATIVE NOTE - URINE OUTPUT
Physical Therapy Daily Note     Diagnosis:   Encounter Diagnosis   Name Primary?    Acute pain of right knee      Physician: Margarito Machuca MD    Evaluation Date: 12/20/16  Date of Surgery: 12/19/16  Visit #: 8  Total Treatment Time: 65  Precautions: s/p ACL with meniscus protocol    Subjective     Pt reports doing well and again is without pain today.    Objective     Measurements taken:     Range of motion (PROM)     Knee:  Left Right   Extension  0 -2   Flexion 145 110       Pt was instructed in and performed ther ex  to increase strength, endurance, ROM, flexibility and core stabilization with activities as follows:     [place] was instructed in and performed therapeutic exercises to develop strength, endurance, ROM, flexibility and core stabilization for 55 minutes including:   Stat bike x8 min  Heel slides 30 reps - less than 120 degrees AAROM -  Quad sets with russian e-stim (10 sec on/10 sec off) and strap x10 min  Heel prop x3 min 1/2 foam 2# - 2 minutes -np  Prone hang 3# x4 min-np  HS sets x30 bolster under knee-np  Seated AAROM R knee ext  x10 NP  4 way hip 1.5# x30  Heel raises x20 (25% WB on R) NP  Mini squats x20 (25% WB on R) NP  Calf stretch x2 min  HSS x 2min -np  Patellar mobs with STM x10 min    Treatment ended with ice x10 min    Written Home Exercises Provided: provided at initial eval  Pt demo good understanding of the education provided. [place] demonstrated good return demonstration of activities.     Education provided:  [place] verbalized good understanding of education provided.   No spiritual or educational barriers to learning identified.    Assessment   Patient continues to progress ROM appropriately according to ACL with meniscus protocol. He is progressing well with ACL with meniscus repair protocol and would continue to benefit from PT intervention to reach functional goals.    This is a 26 y.o. male  900 referred to outpatient physical therapy and presents with a medical diagnosis of s/p R ACL with medial and lateral meniscus repairs and demonstrates limitations as described in the problem list Pt prognosis is Good. Pt will continue to benefit from skilled outpatient physical therapy to address the deficits listed below in the problem list, provide pt/family education and to maximize pt's level of independence in the home and community environment.    Will the patient continue to benefit from skilled PT intervention? yes        Medical necessity is demonstrated by:   - Pain limits function of effected part for all activities  - Unable to participate in daily activities       Progress towards goals: good    New/Revised Goals:none this visit       Plan   Continue with established Plan of Care towards PT goals.     Trixie Amos, PT, DPT

## 2023-03-06 NOTE — BRIEF OPERATIVE NOTE - NSICDXBRIEFPROCEDURE_GEN_ALL_CORE_FT
PROCEDURES:  Robot-assisted bilateral salpingo-oophorectomy (BSO) 06-Mar-2023 12:00:25  Dora Temple  Dilation and curettage of uterus 06-Mar-2023 12:01:22  Dora Temple  Removal of intrauterine device 06-Mar-2023 12:01:44  Dora Temple

## 2023-03-06 NOTE — ASU DISCHARGE PLAN (ADULT/PEDIATRIC) - NS MD DC FALL RISK RISK
For information on Fall & Injury Prevention, visit: [email or web address] OR  [email or web address] OR  https://www.cdc.gov/steadi/patient.html

## 2023-03-06 NOTE — ASU DISCHARGE PLAN (ADULT/PEDIATRIC) - ACTIVITY LEVEL
No excercise/Nothing per rectum/Nothing per vagina/No tub baths/No douching/No tampons/No intercourse No excercise/No heavy lifting/No sports/gym/Nothing per vagina/No tub baths/No douching/No tampons/No intercourse

## 2023-03-06 NOTE — BRIEF OPERATIVE NOTE - SPECIMENS
1. Bilateral fallopian tubes and bilateral ovaries 2. Endometrial curettings 1. Endometrial curettings 2. right tube and ovary 3. left tube and ovary

## 2023-03-06 NOTE — ASU DISCHARGE PLAN (ADULT/PEDIATRIC) - PROCEDURE
Robotic Assisted, Laparoscopic Bilateral Salpingo-Oophorectomy, Dilation and Curettage, Intrauterine Device Removal

## 2023-03-06 NOTE — BRIEF OPERATIVE NOTE - OPERATION/FINDINGS
EUA: Exam under anesthesia reveal an anteverted uterus approximately 8 weeks in size.  Laparoscopy: Intra-abdominal survey revealed cristino-hepatic adhesions as well as omental adhesions to the anterior abdominal wall at the level of the umbilicus. Grossly normal uterus and bilateral fallopian tubes. Small cyst noted on the right ovary, sent to frozen pathology. Grossly normal left ovary. Grossly normal bowel and appendix EUA: Exam under anesthesia reveal an anteverted uterus , no palpable adnexal masses.  Laparoscopy: Intra-abdominal survey revealed cristino-hepatic adhesions as well as omental adhesions to the anterior abdominal wall superior to the level of the umbilicus. Grossly normal uterus and bilateral fallopian tubes. Small 1cm hemorrhagic cyst noted on the left ovary, sent to frozen pathology - benign on frozen. Grossly normal right ovary. Grossly normal small and large bowel and appendix.

## 2023-03-06 NOTE — CHART NOTE - NSCHARTNOTEFT_GEN_A_CORE
GYNECOLOGIC ONCOLOGY PA POST OP  NOTE    Patient seen and examined at bedside, resting comfortably.  c/o nausea earlier, Zofran and Reglan x 1 given with good response. Pain well controlled. patient denies headache, dizziness, vomiting, chest pain and sob. Tolerating clears. Baldwin removed in OR-> DTV.    OBJECTIVE:     VITALS:  T(F): 97.7 (03-06-23 @ 13:00), Max: 98.4 (03-06-23 @ 06:42)  HR: 80 (03-06-23 @ 13:30) (67 - 83)  BP: 104/62 (03-06-23 @ 13:30) (89/59 - 104/69)  RR: 15 (03-06-23 @ 13:30) (12 - 23)  SpO2: 93% (03-06-23 @ 13:30) (93% - 100%)  Wt(kg): --    I&O's Summary    06 Mar 2023 07:01  -  06 Mar 2023 13:34  --------------------------------------------------------  IN: 250 mL / OUT: 0 mL / NET: 250 mL        MEDICATIONS  (STANDING):  chlorhexidine 2% Cloths 1 Application(s) Topical daily  lactated ringers. 1000 milliLiter(s) (30 mL/Hr) IV Continuous <Continuous>  lactated ringers. 1000 milliLiter(s) (125 mL/Hr) IV Continuous <Continuous>    MEDICATIONS  (PRN):  HYDROmorphone  Injectable 0.5 milliGRAM(s) IV Push every 10 minutes PRN Moderate Pain (4 - 6)      Physical Exam:  Constitutional: NAD  Pulmonary: clear to auscultation bilaterally   Cardiovascular: Regular rate and rhythm   Abdomen: soft, non-distended, appropriate tenderness, scope sites C/D/I  Extremities: no lower extremity edema or calve tenderness, bilat venodynes in place    Assessment/Plan:  48yo female s/p Robot-assisted bilateral salpingo-oophorectomy (BSO), Dilation and curettage of uterus, Removal of intrauterine device (frozen: hemorrhagic corpus luteum cyst). See operative note for details.  -Regular diet  -LR@125  -DTV @ 7pm  -discharge home once ambulating and voiding without difficulty, tolerated more po  -discharge instructions reviewed, all questions answered  -Follow up with Dr. Hayes in 2 weeks    Noemí English PA-C  #[id number]

## 2023-03-06 NOTE — ASU DISCHARGE PLAN (ADULT/PEDIATRIC) - CARE PROVIDER_API CALL
Jenna Hayes)  Gynecologic Oncology; Obstetrics and Gynecology  [address] [place] [address]  Phone: [phone]  Fax: [phone]  Scheduled Appointment: 03/21/2023 10:30 AM

## 2023-03-06 NOTE — ASU DISCHARGE PLAN (ADULT/PEDIATRIC) - MEDICATION INSTRUCTIONS
600 mg of motrin every 6 hours as needed for pain, 975mg of tylenol every 6 hours as needed for pain, 5mg of oxycodone every 6 hours as needed for pain

## 2023-03-06 NOTE — ASU DISCHARGE PLAN (ADULT/PEDIATRIC) - FOLLOW UP APPOINTMENTS
[facility] may also call Recovery Room (PACU) 24/7 @ [phone]

## 2023-03-06 NOTE — PHYSICAL EXAM
[Chaperone Present] : A chaperone was present in the examining room during all aspects of the physical examination [Normal] : Anus and perineum: Normal sphincter tone, no masses, no prolapse. [Fully active, able to carry on all pre-disease performance without restriction] : Status 0 - Fully active, able to carry on all pre-disease performance without restriction [de-identified] : adnexa nonpalpable [de-identified] : vertical midline epigastric scar

## 2023-03-06 NOTE — LETTER BODY
[Dear  ___] : Dear  [unfilled], [I had the pleasure of evaluating your patient, [unfilled] for ___] : I had the pleasure of evaluating your patient, [unfilled] for [unfilled]. [Attached please find my note.] : Attached please find my note. [FreeTextEntry2] : She will be scheduled for surgery in the near future and I will keep you updated on her progress. Thank you very much for referring this merary patient.\par \par

## 2023-03-07 LAB — NON-GYNECOLOGICAL CYTOLOGY STUDY: SIGNIFICANT CHANGE UP

## 2023-03-08 ENCOUNTER — NON-APPOINTMENT (OUTPATIENT)
Age: 48
End: 2023-03-08

## 2023-03-10 LAB — SURGICAL PATHOLOGY STUDY: SIGNIFICANT CHANGE UP

## 2023-03-21 ENCOUNTER — APPOINTMENT (OUTPATIENT)
Dept: GYNECOLOGIC ONCOLOGY | Facility: CLINIC | Age: 48
End: 2023-03-21
Payer: COMMERCIAL

## 2023-03-21 VITALS — SYSTOLIC BLOOD PRESSURE: 119 MMHG | HEART RATE: 72 BPM | DIASTOLIC BLOOD PRESSURE: 87 MMHG

## 2023-03-21 PROCEDURE — 99212 OFFICE O/P EST SF 10 MIN: CPT

## 2023-03-21 NOTE — REASON FOR VISIT
[de-identified] : 3/6/2023 [de-identified] : Robotic-assisted bilateral salpingo-oophorectomy and lysis of adhesions;  dilation and curettage with removal of intrauterine device. [de-identified] : She reports that she has had a very smooth recovery with no issues. She denies abnl vaginal bleeding, pelvic or abdominal pain or urinary or bowel complaints. No longer requiring pain medication. Returning to usual activities and maintaining pelvic rest.\par

## 2023-03-21 NOTE — LETTER BODY
[Dear  ___] : Dear  [unfilled], [I recently saw our patient [unfilled] for a follow-up visit.] : I recently saw our patient, [unfilled] for a follow-up visit. [Attached please find my note.] : Attached please find my note. [FreeTextEntry2] : She underwent risk-reducing BSO and D&C and pathology was benign. She is healing well and will be having routine GYN followup. Thank you again for referring this merary patient.\par \par  [FreeTextEntry1] : pathology 3/6/23

## 2023-04-11 ENCOUNTER — OUTPATIENT (OUTPATIENT)
Dept: OUTPATIENT SERVICES | Facility: HOSPITAL | Age: 48
LOS: 1 days | Discharge: HOME | End: 2023-04-11

## 2023-04-11 DIAGNOSIS — Z98.890 OTHER SPECIFIED POSTPROCEDURAL STATES: Chronic | ICD-10-CM

## 2023-04-11 DIAGNOSIS — Z45.2 ENCOUNTER FOR ADJUSTMENT AND MANAGEMENT OF VASCULAR ACCESS DEVICE: Chronic | ICD-10-CM

## 2023-04-11 DIAGNOSIS — C25.9 MALIGNANT NEOPLASM OF PANCREAS, UNSPECIFIED: ICD-10-CM

## 2023-04-14 ENCOUNTER — RESULT REVIEW (OUTPATIENT)
Age: 48
End: 2023-04-14

## 2023-04-14 ENCOUNTER — APPOINTMENT (OUTPATIENT)
Dept: INFUSION THERAPY | Facility: CLINIC | Age: 48
End: 2023-04-14

## 2023-04-14 LAB
ALBUMIN SERPL ELPH-MCNC: 4.6 G/DL — SIGNIFICANT CHANGE UP (ref 3.3–5)
ALP SERPL-CCNC: 87 U/L — SIGNIFICANT CHANGE UP (ref 40–120)
ALT FLD-CCNC: 26 U/L — SIGNIFICANT CHANGE UP (ref 10–45)
ANION GAP SERPL CALC-SCNC: 13 MMOL/L — SIGNIFICANT CHANGE UP (ref 5–17)
AST SERPL-CCNC: 30 U/L — SIGNIFICANT CHANGE UP (ref 10–40)
BASOPHILS # BLD AUTO: 0.08 K/UL — SIGNIFICANT CHANGE UP (ref 0–0.2)
BASOPHILS NFR BLD AUTO: 0.9 % — SIGNIFICANT CHANGE UP (ref 0–2)
BILIRUB SERPL-MCNC: 0.8 MG/DL — SIGNIFICANT CHANGE UP (ref 0.2–1.2)
BUN SERPL-MCNC: 19 MG/DL — SIGNIFICANT CHANGE UP (ref 7–23)
CALCIUM SERPL-MCNC: 10.1 MG/DL — SIGNIFICANT CHANGE UP (ref 8.4–10.5)
CANCER AG19-9 SERPL-ACNC: 11 U/ML — SIGNIFICANT CHANGE UP
CHLORIDE SERPL-SCNC: 100 MMOL/L — SIGNIFICANT CHANGE UP (ref 96–108)
CO2 SERPL-SCNC: 24 MMOL/L — SIGNIFICANT CHANGE UP (ref 22–31)
CREAT SERPL-MCNC: 0.71 MG/DL — SIGNIFICANT CHANGE UP (ref 0.5–1.3)
EGFR: 107 ML/MIN/1.73M2 — SIGNIFICANT CHANGE UP
EOSINOPHIL # BLD AUTO: 0.47 K/UL — SIGNIFICANT CHANGE UP (ref 0–0.5)
EOSINOPHIL NFR BLD AUTO: 5.4 % — SIGNIFICANT CHANGE UP (ref 0–6)
GLUCOSE SERPL-MCNC: 155 MG/DL — HIGH (ref 70–99)
HCT VFR BLD CALC: 42.3 % — SIGNIFICANT CHANGE UP (ref 34.5–45)
HGB BLD-MCNC: 14.2 G/DL — SIGNIFICANT CHANGE UP (ref 11.5–15.5)
IMM GRANULOCYTES NFR BLD AUTO: 1.3 % — HIGH (ref 0–0.9)
LYMPHOCYTES # BLD AUTO: 2.95 K/UL — SIGNIFICANT CHANGE UP (ref 1–3.3)
LYMPHOCYTES # BLD AUTO: 34.1 % — SIGNIFICANT CHANGE UP (ref 13–44)
MCHC RBC-ENTMCNC: 30.4 PG — SIGNIFICANT CHANGE UP (ref 27–34)
MCHC RBC-ENTMCNC: 33.6 GM/DL — SIGNIFICANT CHANGE UP (ref 32–36)
MCV RBC AUTO: 90.6 FL — SIGNIFICANT CHANGE UP (ref 80–100)
MONOCYTES # BLD AUTO: 0.79 K/UL — SIGNIFICANT CHANGE UP (ref 0–0.9)
MONOCYTES NFR BLD AUTO: 9.1 % — SIGNIFICANT CHANGE UP (ref 2–14)
NEUTROPHILS # BLD AUTO: 4.24 K/UL — SIGNIFICANT CHANGE UP (ref 1.8–7.4)
NEUTROPHILS NFR BLD AUTO: 49.2 % — SIGNIFICANT CHANGE UP (ref 43–77)
NRBC # BLD: 0 /100 WBCS — SIGNIFICANT CHANGE UP (ref 0–0)
PLATELET # BLD AUTO: 464 K/UL — HIGH (ref 150–400)
POTASSIUM SERPL-MCNC: 4.6 MMOL/L — SIGNIFICANT CHANGE UP (ref 3.5–5.3)
POTASSIUM SERPL-SCNC: 4.6 MMOL/L — SIGNIFICANT CHANGE UP (ref 3.5–5.3)
PROT SERPL-MCNC: 7.6 G/DL — SIGNIFICANT CHANGE UP (ref 6–8.3)
RBC # BLD: 4.67 M/UL — SIGNIFICANT CHANGE UP (ref 3.8–5.2)
RBC # FLD: 14.1 % — SIGNIFICANT CHANGE UP (ref 10.3–14.5)
SODIUM SERPL-SCNC: 137 MMOL/L — SIGNIFICANT CHANGE UP (ref 135–145)
WBC # BLD: 8.64 K/UL — SIGNIFICANT CHANGE UP (ref 3.8–10.5)
WBC # FLD AUTO: 8.64 K/UL — SIGNIFICANT CHANGE UP (ref 3.8–10.5)

## 2023-04-17 DIAGNOSIS — Z51.11 ENCOUNTER FOR ANTINEOPLASTIC CHEMOTHERAPY: ICD-10-CM

## 2023-04-27 ENCOUNTER — NON-APPOINTMENT (OUTPATIENT)
Age: 48
End: 2023-04-27

## 2023-05-08 ENCOUNTER — APPOINTMENT (OUTPATIENT)
Dept: OBGYN | Facility: CLINIC | Age: 48
End: 2023-05-08
Payer: COMMERCIAL

## 2023-05-08 VITALS
BODY MASS INDEX: 29.23 KG/M2 | DIASTOLIC BLOOD PRESSURE: 84 MMHG | WEIGHT: 165 LBS | HEIGHT: 63 IN | SYSTOLIC BLOOD PRESSURE: 140 MMHG

## 2023-05-08 DIAGNOSIS — Z12.4 ENCOUNTER FOR SCREENING FOR MALIGNANT NEOPLASM OF CERVIX: ICD-10-CM

## 2023-05-08 PROCEDURE — 99386 PREV VISIT NEW AGE 40-64: CPT

## 2023-05-08 RX ORDER — OXYCODONE 5 MG/1
5 TABLET ORAL
Qty: 10 | Refills: 0 | Status: COMPLETED | COMMUNITY
Start: 2022-06-06 | End: 2023-05-08

## 2023-05-09 NOTE — DISCUSSION/SUMMARY
[FreeTextEntry1] : Health Maintenance:\par -Pap with HPV\par -Mammo and breast US Rxs\par -TBSE\par -colonoscopy guidelines reviewed with patient\par -Achieve Vit D levels of 30-40, intake of 1100 mg daily calcium mostly thru dark green\par leafy greens and milk products, exercise 30 minutes TIW\par \par BRCA 2 genetic test positive:\par -Pancreatic cancer - RITESH - dx late 2021 \par -discussed breast and ovarian implications\par Pt has completed BSO\par -sh now questions breast cancer screening vs mastectomy (which she currently thinks is too much)\par -Breast surgery consult highly endorsed to complement the consult which her pancreatic surgeon has provided her. She sees the wisdom in mastectomy but desired further information.\par \par Surgical menopause;\par -plagued by "brain fog"  and hot flashes\par -discussed Relizen and other OTC products such as Black Cohorsh\par -discussed triggers and avoidance\par -exercise and nutrition for cancer prevention. May contact Dr Tobar\par \par \par

## 2023-05-09 NOTE — PHYSICAL EXAM
[Appropriately responsive] : appropriately responsive [Alert] : alert [No Acute Distress] : no acute distress [No Lymphadenopathy] : no lymphadenopathy [Soft] : soft [Non-tender] : non-tender [Non-distended] : non-distended [No HSM] : No HSM [No Lesions] : no lesions [No Mass] : no mass [Oriented x3] : oriented x3 [Examination Of The Breasts] : a normal appearance [No Masses] : no breast masses were palpable [Labia Majora] : normal [Labia Minora] : normal [Normal] : normal [Uterine Adnexae] : normal [FreeTextEntry7] : Well healed 25 cm thin vertical midline scar

## 2023-05-09 NOTE — HISTORY OF PRESENT ILLNESS
[FreeTextEntry1] : 48 yo  with LMP 2020 prior to Liletta\par \par Pancreatic cancer survivor\par \par Menstrual triad: 14 x 28 x 5\par \par OB\par age 16 TOP surgical\par \par GYN:\par OCPs for 10 yrs, Nexplanon and Liletta for 3 yrs\par Abnormal Pap in 20s, cryotherapy\par \par Med:\par Pancreatic cancer - stage 3, neodajuvant chemo, RAMP Radiacal antegrade pancreatectomy Splenectomy surgery splenectomy, left adrenal, tail of pancrease\par Hashimotos\par Menopausal\par \par \par \par  [Mammogramdate] : 1/26/23 [PapSmeardate] : 2019 [ColonoscopyDate] : upcoming 5/23 [TextBox_43] : Dr Hargrove [TextBox_78] : in 20s

## 2023-05-13 LAB — CYTOLOGY CVX/VAG DOC THIN PREP: NORMAL

## 2023-05-16 ENCOUNTER — APPOINTMENT (OUTPATIENT)
Dept: GASTROENTEROLOGY | Facility: AMBULATORY MEDICAL SERVICES | Age: 48
End: 2023-05-16
Payer: COMMERCIAL

## 2023-05-16 PROCEDURE — 45378 DIAGNOSTIC COLONOSCOPY: CPT | Mod: 33

## 2023-05-23 NOTE — PATIENT PROFILE ADULT - MEDICATIONS/VISITS
Physical Therapy    Visit Type: treatment  SUBJECTIVE  Patient agreed to participate in therapy this date.  \"I want to go home.\"  Prior treatment in the past year for same condition: no therapies  Patient has not been hospitalized, in a skilled nursing facility, or seen by home health in the last 30 days.  Patient / Family Goal: maximize function    Pain   RN informed on pain level.    Location: R hip    At onset of session (out of 10): 4     OBJECTIVE     Cognitive Status   Orientation    - Oriented to: person, place, time and situation  Functional Communication   - Overall Status: within functional limits  Following Direction   - follows all commands and directions consistently    Vitals:  At end of session: 137/43      Range of Motion (ROM)   (degrees unless noted; active unless noted; norms in ( ); negative=lacking to 0, positive=beyond 0)  WFL: LLE, right ankle, right knee  Comments: Limited ROM at right hip due to pain    Strength  (out of 5 unless noted, standard test position unless noted)   WFL: LLE, right knee, right ankle  Hip:    - Flexion:        • Right: 3-, pain      Sitting Balance  (MAREN = base of support)  Static      - Trial 1 details: supervision    Standing Balance  (MAREN = base of support)  Firm Surface: Double Leg      - Static, Eyes Open       - Trial 1 details: contact guard and with double UE support      Sensation/Dermatome Testing:    Intact: LLE light touch, RLE light touch    Bed Mobility  - Supine to sit: stand by assist  - Sit to supine: stand by assist  Transfers  Assistive devices: 2-wheeled walker, gait belt  - Sit to stand: contact guard/touching/steadying assist  - Stand to sit: contact guard/touching/steadying assist    Ambulation / Gait  - Assistive device: gait belt and two-wheeled walker  - Distance (feet unless otherwise indicated): 100  - Assist Level: contact guard/touching/steadying assist  - Description: antalgic and step to    Stair Ambulation  - Number of steps: 4;   -  Assist Level: contact guard/touching/steadying assist  - Rails: bilateral rails       Interventions     Supine    Lower Extremity: Bilateral: ankle pumps, gluteus sets and quad sets, AROM, 10 reps, 1 sets  Training provided: transfer training, functional ambulation, bed mobility training, activity tolerance and use of assistive device    Skilled input: Verbal instruction/cues  Verbal Consent: Writer verbally educated and received verbal consent for hand placement, positioning of patient, and techniques to be performed today from patient for therapist position for techniques as described above and how they are pertinent to the patient's plan of care.         Education:   - Present and ready to learn: patient  Education provided during session:  - Results of above outlined education: Verbalizes understanding    ASSESSMENT   Summary of function and discharge needs based on today's assessment:   - Current level of function: slightly below baseline level of function   - Therapy needs at discharge: therapy 1-3 times per week   - Activities of daily living (ADLs) requiring support at discharge: bed mobility, transfers, ambulation and stairs   - Impairments that require further therapy intervention: balance, activity tolerance, pain and ROM    Patient seen for physical therapy session; able to ambulate household distance and complete stairs with handrails. Patient educated to have  with her on stairs at home. Patient completed necessary functional mobility this date.    AM-PAC  - Generalized Prior Level of Function: IND/MOD I ([facility] 22-24)       Key: MOD A=moderate assistance, IND/MOD I=independent/modified independent  - Generalized Current Level of Function     - Current Mobility Score: 18       AM-PAC Scoring Key= >21 Modified Independent; 20-21 Supervision; 18-19 Minimal assist; 13-18 Moderate assist; 9-12 Max assist; <9 Total assist        PLAN   Suggestions for next session as indicated:   PT Frequency: Twice  per day         Agreement to plan and goals: patient agrees with goals and treatment plan        GOALS  Review Date: 5/23/2023  Long Term Goals: (to be met by time of discharge from hospital)  Sit to supine: Patient will complete sit to supine independent.  Status: progressing/ongoing  Supine to sit: Patient will complete supine to sit independent.  Status: progressing/ongoing  Sit to stand: Patient will complete sit to stand transfer with 2-wheeled walker, modified independent.   Status: progressing/ongoing  Stand to sit: Patient will complete stand to sit transfer with 2-wheeled walker, modified independent.   Status: progressing/ongoing  Ambulation (even): Patient will ambulate on even surface for 150 feet with 2-wheeled walker, modified independent.   Status: progressing/ongoing  3-4 steps: Patient will ambulate 3-4 steps with using one rail, modified independent.  Status: progressing/ongoing    Documented in the chart in the following areas: Assessment/Plan.      Patient at End of Session:   Location: in bed  Handoff to: nurse      Therapy procedure time and total treatment time can be found documented on the Time Entry flowsheet   no

## 2023-06-02 ENCOUNTER — APPOINTMENT (OUTPATIENT)
Dept: INFUSION THERAPY | Facility: CLINIC | Age: 48
End: 2023-06-02

## 2023-06-19 ENCOUNTER — APPOINTMENT (OUTPATIENT)
Dept: BREAST CENTER | Facility: CLINIC | Age: 48
End: 2023-06-19
Payer: COMMERCIAL

## 2023-06-19 VITALS
HEIGHT: 63 IN | HEART RATE: 84 BPM | DIASTOLIC BLOOD PRESSURE: 90 MMHG | BODY MASS INDEX: 29.23 KG/M2 | SYSTOLIC BLOOD PRESSURE: 125 MMHG | WEIGHT: 165 LBS

## 2023-06-19 DIAGNOSIS — Z78.9 OTHER SPECIFIED HEALTH STATUS: ICD-10-CM

## 2023-06-19 DIAGNOSIS — Z72.3 LACK OF PHYSICAL EXERCISE: ICD-10-CM

## 2023-06-19 DIAGNOSIS — Z91.89 OTHER SPECIFIED PERSONAL RISK FACTORS, NOT ELSEWHERE CLASSIFIED: ICD-10-CM

## 2023-06-19 PROCEDURE — 99205 OFFICE O/P NEW HI 60 MIN: CPT

## 2023-06-19 RX ORDER — ACYCLOVIR 400 MG/1
400 TABLET ORAL TWICE DAILY
Qty: 18 | Refills: 3 | Status: DISCONTINUED | COMMUNITY
Start: 2022-03-21 | End: 2023-06-19

## 2023-06-19 RX ORDER — AZELASTINE HYDROCHLORIDE 137 UG/1
137 SPRAY, METERED NASAL
Qty: 30 | Refills: 0 | Status: DISCONTINUED | COMMUNITY
Start: 2023-03-23

## 2023-06-19 RX ORDER — PROCHLORPERAZINE MALEATE 10 MG/1
10 TABLET ORAL EVERY 6 HOURS
Qty: 100 | Refills: 1 | Status: DISCONTINUED | COMMUNITY
Start: 2021-10-19 | End: 2023-06-19

## 2023-06-19 RX ORDER — CLOBETASOL PROPIONATE 0.5 MG/G
0.05 OINTMENT TOPICAL
Qty: 60 | Refills: 0 | Status: DISCONTINUED | COMMUNITY
Start: 2023-04-26

## 2023-06-19 RX ORDER — DEXAMETHASONE 4 MG/1
4 TABLET ORAL
Qty: 36 | Refills: 1 | Status: DISCONTINUED | COMMUNITY
Start: 2021-10-26 | End: 2023-06-19

## 2023-06-19 RX ORDER — METHYLPREDNISOLONE 4 MG/1
4 TABLET ORAL
Qty: 21 | Refills: 0 | Status: DISCONTINUED | COMMUNITY
Start: 2023-03-23

## 2023-06-19 RX ORDER — MUPIROCIN 20 MG/G
2 OINTMENT TOPICAL
Qty: 22 | Refills: 0 | Status: DISCONTINUED | COMMUNITY
Start: 2023-04-26

## 2023-06-19 RX ORDER — DUPILUMAB 300 MG/2ML
300 INJECTION, SOLUTION SUBCUTANEOUS
Qty: 4 | Refills: 0 | Status: ACTIVE | COMMUNITY
Start: 2023-06-06

## 2023-06-19 RX ORDER — ONDANSETRON 8 MG/1
8 TABLET, ORALLY DISINTEGRATING ORAL EVERY 8 HOURS
Qty: 28 | Refills: 3 | Status: DISCONTINUED | COMMUNITY
Start: 2021-10-26 | End: 2023-06-19

## 2023-06-19 RX ORDER — DOXYCYCLINE HYCLATE 100 MG/1
100 CAPSULE ORAL
Qty: 14 | Refills: 0 | Status: DISCONTINUED | COMMUNITY
Start: 2023-03-23

## 2023-06-19 RX ORDER — SODIUM PICOSULFATE, MAGNESIUM OXIDE, AND ANHYDROUS CITRIC ACID 10; 3.5; 12 MG/160ML; G/160ML; G/160ML
10-3.5-12 MG-GM LIQUID ORAL
Qty: 1 | Refills: 0 | Status: DISCONTINUED | COMMUNITY
Start: 2023-01-31 | End: 2023-06-19

## 2023-06-19 RX ORDER — NEISSERIA MENINGITIDIS SEROGROUP B NHBA FUSION PROTEIN ANTIGEN, NEISSERIA MENINGITIDIS SEROGROUP B FHBP FUSION PROTEIN ANTIGEN AND NEISSERIA MENINGITIDIS SEROGROUP B NADA PROTEIN ANTIGEN 50; 50; 50; 25 UG/.5ML; UG/.5ML; UG/.5ML; UG/.5ML
INJECTION, SUSPENSION INTRAMUSCULAR
Qty: 1 | Refills: 0 | Status: DISCONTINUED | COMMUNITY
Start: 2022-05-03 | End: 2023-06-19

## 2023-06-19 RX ORDER — OSELTAMIVIR PHOSPHATE 75 MG/1
75 CAPSULE ORAL
Qty: 10 | Refills: 0 | Status: DISCONTINUED | COMMUNITY
Start: 2023-01-18

## 2023-06-19 RX ORDER — LORAZEPAM 2 MG/1
2 TABLET ORAL TWICE DAILY
Qty: 60 | Refills: 0 | Status: DISCONTINUED | COMMUNITY
Start: 2022-03-04 | End: 2023-06-19

## 2023-06-19 NOTE — DATA REVIEWED
[FreeTextEntry1] : I have independently reviewed the reports and the images. \par \par Breast MRI 1/12/23\par - 2.5 cm enlarged R axillary node; US and possible US bx\par - BIRADS 4\par \par R US 1/23/23\par - 2.6 cm R axillary node with cortical thickening; US bx\par - BIRADS 4\par \par US bx 1/26/23\par - R axillary, twirl clip = benign reactive node; concordant; 1 yr US

## 2023-06-19 NOTE — CONSULT LETTER
[Dear  ___] : Dear  [unfilled], [Consult Letter:] : I had the pleasure of evaluating your patient, [unfilled]. [Please see my note below.] : Please see my note below. [Consult Closing:] : Thank you very much for allowing me to participate in the care of this patient.  If you have any questions, please do not hesitate to contact me. [Sincerely,] : Sincerely, [FreeTextEntry3] : Melanie Pierce MD FACS  [DrVidhi  ___] : Dr. MENDEZ

## 2023-06-19 NOTE — HISTORY OF PRESENT ILLNESS
[FreeTextEntry1] : Ms. Garcia is a 47 year old woman who presents for a consultation for a high risk for breast cancer from a BRCA2 mutation. She is asymptomatic. No palpable breast or axillary lumps, nipple discharge, or skin changes. Her last mammogram was in 2019, but she recently had a breast MRI.\par \par Her personal history includes pancreatic cancer, and her Surgical Oncologist encouraged her to proceed with risk reducing management for the breasts now. Her genetic testing shows a BRCA2 mutation, and she is s/p bilateral salpingo-oophorectomy. Her family history is not significant for any breast cancer. Her paternal aunt also carries a BRCA2 mutation.

## 2023-06-19 NOTE — PAST MEDICAL HISTORY
[Menarche Age ____] : age at menarche was [unfilled] [Total Preg ___] : G[unfilled] [Surgical Menopause] : The patient is in surgical menopause [History of Hormone Replacement Treatment] : has no history of hormone replacement treatment

## 2023-06-29 ENCOUNTER — APPOINTMENT (OUTPATIENT)
Dept: PLASTIC SURGERY | Facility: CLINIC | Age: 48
End: 2023-06-29
Payer: COMMERCIAL

## 2023-06-29 VITALS — HEART RATE: 71 BPM | DIASTOLIC BLOOD PRESSURE: 81 MMHG | SYSTOLIC BLOOD PRESSURE: 122 MMHG | OXYGEN SATURATION: 96 %

## 2023-06-29 PROCEDURE — 99204 OFFICE O/P NEW MOD 45 MIN: CPT

## 2023-07-07 ENCOUNTER — OUTPATIENT (OUTPATIENT)
Dept: OUTPATIENT SERVICES | Facility: HOSPITAL | Age: 48
LOS: 1 days | Discharge: HOME | End: 2023-07-07

## 2023-07-07 DIAGNOSIS — Z98.890 OTHER SPECIFIED POSTPROCEDURAL STATES: Chronic | ICD-10-CM

## 2023-07-07 DIAGNOSIS — C25.9 MALIGNANT NEOPLASM OF PANCREAS, UNSPECIFIED: ICD-10-CM

## 2023-07-07 DIAGNOSIS — Z45.2 ENCOUNTER FOR ADJUSTMENT AND MANAGEMENT OF VASCULAR ACCESS DEVICE: Chronic | ICD-10-CM

## 2023-07-14 ENCOUNTER — APPOINTMENT (OUTPATIENT)
Dept: INFUSION THERAPY | Facility: CLINIC | Age: 48
End: 2023-07-14

## 2023-07-20 NOTE — ASSESSMENT
[FreeTextEntry1] : The patient was counseled about reconstructive options following mastectomy, including autologous, prosthetic, and the options of foregoing reconstruction.  Additionally, she was explained the options regarding the timing of reconstruction, including immediate reconstruction at the time of mastectomy or delayed reconstruction at a later date. \par \par The patient was extensively counseled on the operation and the perioperative recoveries of both autologous and prosthetic reconstructions.  The patient understands the risks with abdominally based microsurgical reconstruction including partial or total flap loss, revisit to the operating room in the perioperative period, wound dehiscence, mastectomy skin flap necrosis, fat necrosis, abdominal wall morbidity (laxity, bulge, hernia), asymmetry, paraesthesia, seroma, hematoma, and infection.  Placement of abdominal mesh is also planned and this was discussed with the patient. Risks of abdominal mesh placement include infection, extrusion, need for removal, mesh seroma. She also understands the risks with implant-based reconstruction including infection, implant malposition, extrusion, deflation, capsular contracture, mastectomy skin flap necrosis, wound dehiscence, asymmetry, seroma, paraesthesia, and hematoma. \par \par The patient understands all of these risks and she provides informed consent.\par \par the plan is for first surgery breast reduction for nipple positioning and secondary nipple sparing mastectomy with innervated CELINA flap\par \par \par

## 2023-07-20 NOTE — HISTORY OF PRESENT ILLNESS
[FreeTextEntry1] : Ms. BOUCHRA PALACIOS  is a 47 year  old female  with past medical history of pancreatic cancer, GERD, hypothyroidism, who presents with positive BRCA 2 gene.  Pt was referred to the office by Dr Pierce to discuss her reconstructive options. \par \par smoking status: non smoker\par anticoagulation: none\par history of bleeding disorder: splenic thromobosis \par family history of breast cancer: \par

## 2023-07-20 NOTE — PHYSICAL EXAM
[NI] : Normal [de-identified] : please see scanned in breast sheet\par SN-N: L - 31, R - 30\par N-IMF: L - 12, R - 11\par BW: L/R - 16

## 2023-07-25 ENCOUNTER — APPOINTMENT (OUTPATIENT)
Dept: MRI IMAGING | Facility: CLINIC | Age: 48
End: 2023-07-25
Payer: COMMERCIAL

## 2023-07-25 ENCOUNTER — APPOINTMENT (OUTPATIENT)
Dept: INFUSION THERAPY | Facility: CLINIC | Age: 48
End: 2023-07-25

## 2023-07-25 ENCOUNTER — RESULT REVIEW (OUTPATIENT)
Age: 48
End: 2023-07-25

## 2023-07-25 ENCOUNTER — OUTPATIENT (OUTPATIENT)
Dept: OUTPATIENT SERVICES | Facility: HOSPITAL | Age: 48
LOS: 1 days | End: 2023-07-25
Payer: COMMERCIAL

## 2023-07-25 DIAGNOSIS — Z45.2 ENCOUNTER FOR ADJUSTMENT AND MANAGEMENT OF VASCULAR ACCESS DEVICE: Chronic | ICD-10-CM

## 2023-07-25 DIAGNOSIS — Z00.8 ENCOUNTER FOR OTHER GENERAL EXAMINATION: ICD-10-CM

## 2023-07-25 DIAGNOSIS — Z98.890 OTHER SPECIFIED POSTPROCEDURAL STATES: Chronic | ICD-10-CM

## 2023-07-25 LAB
ALBUMIN SERPL ELPH-MCNC: 4.5 G/DL — SIGNIFICANT CHANGE UP (ref 3.3–5)
ALP SERPL-CCNC: 91 U/L — SIGNIFICANT CHANGE UP (ref 40–120)
ALT FLD-CCNC: 19 U/L — SIGNIFICANT CHANGE UP (ref 10–45)
ANION GAP SERPL CALC-SCNC: 13 MMOL/L — SIGNIFICANT CHANGE UP (ref 5–17)
AST SERPL-CCNC: 25 U/L — SIGNIFICANT CHANGE UP (ref 10–40)
BASOPHILS # BLD AUTO: 0.1 K/UL — SIGNIFICANT CHANGE UP (ref 0–0.2)
BASOPHILS NFR BLD AUTO: 1.1 % — SIGNIFICANT CHANGE UP (ref 0–2)
BILIRUB SERPL-MCNC: 1.2 MG/DL — SIGNIFICANT CHANGE UP (ref 0.2–1.2)
BUN SERPL-MCNC: 19 MG/DL — SIGNIFICANT CHANGE UP (ref 7–23)
CALCIUM SERPL-MCNC: 9.6 MG/DL — SIGNIFICANT CHANGE UP (ref 8.4–10.5)
CANCER AG19-9 SERPL-ACNC: 9 U/ML — SIGNIFICANT CHANGE UP
CHLORIDE SERPL-SCNC: 105 MMOL/L — SIGNIFICANT CHANGE UP (ref 96–108)
CO2 SERPL-SCNC: 24 MMOL/L — SIGNIFICANT CHANGE UP (ref 22–31)
CREAT SERPL-MCNC: 0.69 MG/DL — SIGNIFICANT CHANGE UP (ref 0.5–1.3)
EGFR: 109 ML/MIN/1.73M2 — SIGNIFICANT CHANGE UP
EOSINOPHIL # BLD AUTO: 0.63 K/UL — HIGH (ref 0–0.5)
EOSINOPHIL NFR BLD AUTO: 7.1 % — HIGH (ref 0–6)
GLUCOSE SERPL-MCNC: 140 MG/DL — HIGH (ref 70–99)
HCT VFR BLD CALC: 39.7 % — SIGNIFICANT CHANGE UP (ref 34.5–45)
HGB BLD-MCNC: 13.4 G/DL — SIGNIFICANT CHANGE UP (ref 11.5–15.5)
IMM GRANULOCYTES NFR BLD AUTO: 0.2 % — SIGNIFICANT CHANGE UP (ref 0–0.9)
LYMPHOCYTES # BLD AUTO: 2.5 K/UL — SIGNIFICANT CHANGE UP (ref 1–3.3)
LYMPHOCYTES # BLD AUTO: 28 % — SIGNIFICANT CHANGE UP (ref 13–44)
MCHC RBC-ENTMCNC: 30.7 PG — SIGNIFICANT CHANGE UP (ref 27–34)
MCHC RBC-ENTMCNC: 33.8 GM/DL — SIGNIFICANT CHANGE UP (ref 32–36)
MCV RBC AUTO: 91.1 FL — SIGNIFICANT CHANGE UP (ref 80–100)
MONOCYTES # BLD AUTO: 0.81 K/UL — SIGNIFICANT CHANGE UP (ref 0–0.9)
MONOCYTES NFR BLD AUTO: 9.1 % — SIGNIFICANT CHANGE UP (ref 2–14)
NEUTROPHILS # BLD AUTO: 4.87 K/UL — SIGNIFICANT CHANGE UP (ref 1.8–7.4)
NEUTROPHILS NFR BLD AUTO: 54.5 % — SIGNIFICANT CHANGE UP (ref 43–77)
NRBC # BLD: 0 /100 WBCS — SIGNIFICANT CHANGE UP (ref 0–0)
PLATELET # BLD AUTO: 411 K/UL — HIGH (ref 150–400)
POTASSIUM SERPL-MCNC: 4.1 MMOL/L — SIGNIFICANT CHANGE UP (ref 3.5–5.3)
POTASSIUM SERPL-SCNC: 4.1 MMOL/L — SIGNIFICANT CHANGE UP (ref 3.5–5.3)
PROT SERPL-MCNC: 6.6 G/DL — SIGNIFICANT CHANGE UP (ref 6–8.3)
RBC # BLD: 4.36 M/UL — SIGNIFICANT CHANGE UP (ref 3.8–5.2)
RBC # FLD: 13.2 % — SIGNIFICANT CHANGE UP (ref 10.3–14.5)
SODIUM SERPL-SCNC: 142 MMOL/L — SIGNIFICANT CHANGE UP (ref 135–145)
WBC # BLD: 8.93 K/UL — SIGNIFICANT CHANGE UP (ref 3.8–10.5)
WBC # FLD AUTO: 8.93 K/UL — SIGNIFICANT CHANGE UP (ref 3.8–10.5)

## 2023-07-25 PROCEDURE — 72198 MR ANGIO PELVIS W/O & W/DYE: CPT | Mod: 26

## 2023-07-25 PROCEDURE — 74185 MRA ABD W OR W/O CNTRST: CPT | Mod: 26

## 2023-07-25 PROCEDURE — A9585: CPT

## 2023-07-25 PROCEDURE — C8902: CPT

## 2023-07-25 PROCEDURE — C8920: CPT

## 2023-08-04 ENCOUNTER — NON-APPOINTMENT (OUTPATIENT)
Age: 48
End: 2023-08-04

## 2023-08-04 ENCOUNTER — RESULT REVIEW (OUTPATIENT)
Age: 48
End: 2023-08-04

## 2023-08-04 ENCOUNTER — APPOINTMENT (OUTPATIENT)
Dept: MAMMOGRAPHY | Facility: CLINIC | Age: 48
End: 2023-08-04
Payer: COMMERCIAL

## 2023-08-04 ENCOUNTER — APPOINTMENT (OUTPATIENT)
Dept: ULTRASOUND IMAGING | Facility: CLINIC | Age: 48
End: 2023-08-04
Payer: COMMERCIAL

## 2023-08-04 ENCOUNTER — OUTPATIENT (OUTPATIENT)
Dept: OUTPATIENT SERVICES | Facility: HOSPITAL | Age: 48
LOS: 1 days | End: 2023-08-04
Payer: COMMERCIAL

## 2023-08-04 DIAGNOSIS — Z98.890 OTHER SPECIFIED POSTPROCEDURAL STATES: Chronic | ICD-10-CM

## 2023-08-04 DIAGNOSIS — Z45.2 ENCOUNTER FOR ADJUSTMENT AND MANAGEMENT OF VASCULAR ACCESS DEVICE: Chronic | ICD-10-CM

## 2023-08-04 DIAGNOSIS — Z15.01 GENETIC SUSCEPTIBILITY TO MALIGNANT NEOPLASM OF BREAST: ICD-10-CM

## 2023-08-04 DIAGNOSIS — Z00.8 ENCOUNTER FOR OTHER GENERAL EXAMINATION: ICD-10-CM

## 2023-08-04 PROCEDURE — 77066 DX MAMMO INCL CAD BI: CPT

## 2023-08-04 PROCEDURE — 77066 DX MAMMO INCL CAD BI: CPT | Mod: 26

## 2023-08-04 PROCEDURE — G0279: CPT | Mod: 26

## 2023-08-04 PROCEDURE — 76641 ULTRASOUND BREAST COMPLETE: CPT | Mod: 26,50

## 2023-08-04 PROCEDURE — G0279: CPT

## 2023-08-04 PROCEDURE — 76641 ULTRASOUND BREAST COMPLETE: CPT

## 2023-08-15 ENCOUNTER — NON-APPOINTMENT (OUTPATIENT)
Age: 48
End: 2023-08-15

## 2023-08-21 ENCOUNTER — OUTPATIENT (OUTPATIENT)
Dept: OUTPATIENT SERVICES | Facility: HOSPITAL | Age: 48
LOS: 1 days | End: 2023-08-21
Payer: COMMERCIAL

## 2023-08-21 ENCOUNTER — APPOINTMENT (OUTPATIENT)
Dept: CT IMAGING | Facility: CLINIC | Age: 48
End: 2023-08-21
Payer: COMMERCIAL

## 2023-08-21 DIAGNOSIS — Z98.890 OTHER SPECIFIED POSTPROCEDURAL STATES: Chronic | ICD-10-CM

## 2023-08-21 DIAGNOSIS — C25.8 MALIGNANT NEOPLASM OF OVERLAPPING SITES OF PANCREAS: ICD-10-CM

## 2023-08-21 DIAGNOSIS — Z45.2 ENCOUNTER FOR ADJUSTMENT AND MANAGEMENT OF VASCULAR ACCESS DEVICE: Chronic | ICD-10-CM

## 2023-08-21 DIAGNOSIS — C25.9 MALIGNANT NEOPLASM OF PANCREAS, UNSPECIFIED: ICD-10-CM

## 2023-08-21 PROCEDURE — 74177 CT ABD & PELVIS W/CONTRAST: CPT

## 2023-08-21 PROCEDURE — 71260 CT THORAX DX C+: CPT

## 2023-08-21 PROCEDURE — 74177 CT ABD & PELVIS W/CONTRAST: CPT | Mod: 26

## 2023-08-21 PROCEDURE — 71260 CT THORAX DX C+: CPT | Mod: 26

## 2023-09-01 ENCOUNTER — APPOINTMENT (OUTPATIENT)
Dept: HEMATOLOGY ONCOLOGY | Facility: CLINIC | Age: 48
End: 2023-09-01
Payer: COMMERCIAL

## 2023-09-01 ENCOUNTER — APPOINTMENT (OUTPATIENT)
Dept: INFUSION THERAPY | Facility: CLINIC | Age: 48
End: 2023-09-01
Payer: COMMERCIAL

## 2023-09-01 VITALS
HEART RATE: 66 BPM | HEIGHT: 63 IN | WEIGHT: 167 LBS | BODY MASS INDEX: 29.59 KG/M2 | TEMPERATURE: 98 F | OXYGEN SATURATION: 96 % | RESPIRATION RATE: 18 BRPM

## 2023-09-01 DIAGNOSIS — Z15.01 GENETIC SUSCEPTIBILITY TO MALIGNANT NEOPLASM OF BREAST: ICD-10-CM

## 2023-09-01 DIAGNOSIS — C25.9 MALIGNANT NEOPLASM OF PANCREAS, UNSPECIFIED: ICD-10-CM

## 2023-09-01 PROCEDURE — 99214 OFFICE O/P EST MOD 30 MIN: CPT

## 2023-09-01 NOTE — PHYSICAL EXAM
[Normal] : well developed, well nourished, in no acute distress [de-identified] : looks very well

## 2023-09-01 NOTE — REASON FOR VISIT
[Follow-Up Visit] : a follow-up [Other: _____] : [unfilled] [FreeTextEntry2] : Pancreatic Cancer , BRCA 2 positive

## 2023-09-01 NOTE — ASSESSMENT
[FreeTextEntry1] : 46 y/o female BRCA 2 mutation carrier  with clinical stage III  pancreatic adenocarcinoma.   S/p 8 cycles of neodjuvant FOLFIRINOX.   S/p  neoadjuvant RT concurrent capecitabine to  further improve resectability.   5/27/22 distal pancreatectomy, splenectomy L adrenalectomy , LN dissection. Complete pathologic response.  Adjuvant chemotherapy - FOLFIRINOX x 4 more cycles for a total of 12 cycles of cristino ( pre and post) operative chemotherapy. Completed 8/30/22.   Continue monitoring- exams, tumor marker and scans.   Recent  CT CAP  August 2023 RITESH. Next scans in February 2024.   OK to remove mediport ( will have it done during breast surgery mastectomy in December)    BRCA 2 positive. S/p prophylactic b/l salpingo-oophorectomy [facility] 2023. Bilateral mastectomies with CELINA reconstruction planned for Decmber 2023. Saw dermatology for comprehensive skin exam  Had colonoscopy.  Doing very well.  Exam in January /sooner PRN Next scans in February 2024.   CC: Dr Lovelace/ Dr Lao

## 2023-09-01 NOTE — HISTORY OF PRESENT ILLNESS
[Disease: _____________________] : Disease: [unfilled] [T: ___] : T[unfilled] [N: ___] : N[unfilled] [AJCC Stage: ____] : AJCC Stage: [unfilled] [de-identified] : BOUCHRA PALACIOS is a 45 y.o. with a PMH significant for hypothyroidism, in Oct 2021 diagnosed with  a clinical stage III pancreatic cancer. Genetic testing - BRCA 2 positive.  ~8/2021 - Presented at age 45 with abdominal pains.  Saw PCP, referred to GI Dr. Maldonado.  9/30/21 - Abd US - 3.9 x 4.6 x 4.8cm heterogeneous mass in region of pancreatic tail.  10/7/21 - CT A/P - pancreatic tail mass with splenic artery encasement and splenic vein thrombosis ( tumor thrombus).  Borderline PALN's.  10/11/21 - 10/15/21 - Admit to  for severe pain.  Was set up for outpatient EGD with biopsy but had severe pains, nausea, dry-heaving and so went to ER.  10/11/21 - CT Chest - no evidence of disease. 10/11/21 - MRCP - tumor encases splenic artery, celiac artery contact <180, splenic vein occlusion.  Possible retroperitoneal extension with left adrenal gland contact and possible extension into left celiac ganglion. 10/12/21 - EUS, FNA pancreatic mass - scanty specimen - PATH - poorly differentiated carcinoma.   Neoadjuvant FOLFIRINOX x 8 10/19/21 - 2/15/21   CT CAP 2/15/22 ( post 8 cycles of  Folfirinox) further decrease in pancreatic mass, no new areas of disease.  Neoadjuvant chemoRT to  further improve resectability .  Completed dose  5000 cGy to pancreas  on 4/18/22 ( Dr Tobar) Concurrent capecitabine- stopped due to worsening neuropathies   5/27/22 Distal pancreatectomy, splenectomy, left adrenalectomy  : complete response, no residual cancer. 29 lymph nodes negative .  ( Dr Howell)   Adjuvant FOLFIRINOX x 4 ( to complete total 12 cycles of perioperative chemotherapy) completed 8/30/22.  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  Good energy. Working. Weight is good/ stable. No GI c/o  Scheduled for prophylactic bilat nipple sparing mastectomies and CELINA reconstruction in December 2023 ( Dr Lovelace and Dr Lao). Will have breast reduction first ( Sept 2023)

## 2023-09-01 NOTE — REVIEW OF SYSTEMS
[Patient Intake Form Reviewed] : Patient intake form was reviewed [Negative] : Allergic/Immunologic [Fatigue] : no fatigue [FreeTextEntry7] : I  [de-identified] : numbness occasional tingling in feet post chemo  stable

## 2023-10-25 ENCOUNTER — OUTPATIENT (OUTPATIENT)
Dept: OUTPATIENT SERVICES | Facility: HOSPITAL | Age: 48
LOS: 1 days | Discharge: HOME | End: 2023-10-25

## 2023-10-25 DIAGNOSIS — Z98.890 OTHER SPECIFIED POSTPROCEDURAL STATES: Chronic | ICD-10-CM

## 2023-10-25 DIAGNOSIS — Z45.2 ENCOUNTER FOR ADJUSTMENT AND MANAGEMENT OF VASCULAR ACCESS DEVICE: Chronic | ICD-10-CM

## 2023-10-25 DIAGNOSIS — C25.9 MALIGNANT NEOPLASM OF PANCREAS, UNSPECIFIED: ICD-10-CM

## 2023-10-27 ENCOUNTER — RESULT REVIEW (OUTPATIENT)
Age: 48
End: 2023-10-27

## 2023-10-27 ENCOUNTER — APPOINTMENT (OUTPATIENT)
Dept: INFUSION THERAPY | Facility: CLINIC | Age: 48
End: 2023-10-27

## 2023-10-27 LAB
ALBUMIN SERPL ELPH-MCNC: 4.2 G/DL — SIGNIFICANT CHANGE UP (ref 3.3–5)
ALBUMIN SERPL ELPH-MCNC: 4.2 G/DL — SIGNIFICANT CHANGE UP (ref 3.3–5)
ALP SERPL-CCNC: 93 U/L — SIGNIFICANT CHANGE UP (ref 40–120)
ALP SERPL-CCNC: 93 U/L — SIGNIFICANT CHANGE UP (ref 40–120)
ALT FLD-CCNC: 20 U/L — SIGNIFICANT CHANGE UP (ref 10–45)
ALT FLD-CCNC: 20 U/L — SIGNIFICANT CHANGE UP (ref 10–45)
ANION GAP SERPL CALC-SCNC: 9 MMOL/L — SIGNIFICANT CHANGE UP (ref 5–17)
ANION GAP SERPL CALC-SCNC: 9 MMOL/L — SIGNIFICANT CHANGE UP (ref 5–17)
AST SERPL-CCNC: 24 U/L — SIGNIFICANT CHANGE UP (ref 10–40)
AST SERPL-CCNC: 24 U/L — SIGNIFICANT CHANGE UP (ref 10–40)
BASOPHILS # BLD AUTO: 0.1 K/UL — SIGNIFICANT CHANGE UP (ref 0–0.2)
BASOPHILS # BLD AUTO: 0.1 K/UL — SIGNIFICANT CHANGE UP (ref 0–0.2)
BASOPHILS NFR BLD AUTO: 1.2 % — SIGNIFICANT CHANGE UP (ref 0–2)
BASOPHILS NFR BLD AUTO: 1.2 % — SIGNIFICANT CHANGE UP (ref 0–2)
BILIRUB SERPL-MCNC: 0.9 MG/DL — SIGNIFICANT CHANGE UP (ref 0.2–1.2)
BILIRUB SERPL-MCNC: 0.9 MG/DL — SIGNIFICANT CHANGE UP (ref 0.2–1.2)
BUN SERPL-MCNC: 24 MG/DL — HIGH (ref 7–23)
BUN SERPL-MCNC: 24 MG/DL — HIGH (ref 7–23)
CALCIUM SERPL-MCNC: 9.7 MG/DL — SIGNIFICANT CHANGE UP (ref 8.4–10.5)
CALCIUM SERPL-MCNC: 9.7 MG/DL — SIGNIFICANT CHANGE UP (ref 8.4–10.5)
CHLORIDE SERPL-SCNC: 104 MMOL/L — SIGNIFICANT CHANGE UP (ref 96–108)
CHLORIDE SERPL-SCNC: 104 MMOL/L — SIGNIFICANT CHANGE UP (ref 96–108)
CO2 SERPL-SCNC: 26 MMOL/L — SIGNIFICANT CHANGE UP (ref 22–31)
CO2 SERPL-SCNC: 26 MMOL/L — SIGNIFICANT CHANGE UP (ref 22–31)
CREAT SERPL-MCNC: 0.68 MG/DL — SIGNIFICANT CHANGE UP (ref 0.5–1.3)
CREAT SERPL-MCNC: 0.68 MG/DL — SIGNIFICANT CHANGE UP (ref 0.5–1.3)
EGFR: 109 ML/MIN/1.73M2 — SIGNIFICANT CHANGE UP
EGFR: 109 ML/MIN/1.73M2 — SIGNIFICANT CHANGE UP
EOSINOPHIL # BLD AUTO: 0.58 K/UL — HIGH (ref 0–0.5)
EOSINOPHIL # BLD AUTO: 0.58 K/UL — HIGH (ref 0–0.5)
EOSINOPHIL NFR BLD AUTO: 6.8 % — HIGH (ref 0–6)
EOSINOPHIL NFR BLD AUTO: 6.8 % — HIGH (ref 0–6)
GLUCOSE SERPL-MCNC: 86 MG/DL — SIGNIFICANT CHANGE UP (ref 70–99)
GLUCOSE SERPL-MCNC: 86 MG/DL — SIGNIFICANT CHANGE UP (ref 70–99)
HCT VFR BLD CALC: 35.6 % — SIGNIFICANT CHANGE UP (ref 34.5–45)
HCT VFR BLD CALC: 35.6 % — SIGNIFICANT CHANGE UP (ref 34.5–45)
HGB BLD-MCNC: 12.1 G/DL — SIGNIFICANT CHANGE UP (ref 11.5–15.5)
HGB BLD-MCNC: 12.1 G/DL — SIGNIFICANT CHANGE UP (ref 11.5–15.5)
IMM GRANULOCYTES NFR BLD AUTO: 0.2 % — SIGNIFICANT CHANGE UP (ref 0–0.9)
IMM GRANULOCYTES NFR BLD AUTO: 0.2 % — SIGNIFICANT CHANGE UP (ref 0–0.9)
LYMPHOCYTES # BLD AUTO: 3.79 K/UL — HIGH (ref 1–3.3)
LYMPHOCYTES # BLD AUTO: 3.79 K/UL — HIGH (ref 1–3.3)
LYMPHOCYTES # BLD AUTO: 44.4 % — HIGH (ref 13–44)
LYMPHOCYTES # BLD AUTO: 44.4 % — HIGH (ref 13–44)
MCHC RBC-ENTMCNC: 30.4 PG — SIGNIFICANT CHANGE UP (ref 27–34)
MCHC RBC-ENTMCNC: 30.4 PG — SIGNIFICANT CHANGE UP (ref 27–34)
MCHC RBC-ENTMCNC: 34 GM/DL — SIGNIFICANT CHANGE UP (ref 32–36)
MCHC RBC-ENTMCNC: 34 GM/DL — SIGNIFICANT CHANGE UP (ref 32–36)
MCV RBC AUTO: 89.4 FL — SIGNIFICANT CHANGE UP (ref 80–100)
MCV RBC AUTO: 89.4 FL — SIGNIFICANT CHANGE UP (ref 80–100)
MONOCYTES # BLD AUTO: 0.92 K/UL — HIGH (ref 0–0.9)
MONOCYTES # BLD AUTO: 0.92 K/UL — HIGH (ref 0–0.9)
MONOCYTES NFR BLD AUTO: 10.8 % — SIGNIFICANT CHANGE UP (ref 2–14)
MONOCYTES NFR BLD AUTO: 10.8 % — SIGNIFICANT CHANGE UP (ref 2–14)
NEUTROPHILS # BLD AUTO: 3.13 K/UL — SIGNIFICANT CHANGE UP (ref 1.8–7.4)
NEUTROPHILS # BLD AUTO: 3.13 K/UL — SIGNIFICANT CHANGE UP (ref 1.8–7.4)
NEUTROPHILS NFR BLD AUTO: 36.6 % — LOW (ref 43–77)
NEUTROPHILS NFR BLD AUTO: 36.6 % — LOW (ref 43–77)
NRBC # BLD: 0 /100 WBCS — SIGNIFICANT CHANGE UP (ref 0–0)
NRBC # BLD: 0 /100 WBCS — SIGNIFICANT CHANGE UP (ref 0–0)
PLATELET # BLD AUTO: 399 K/UL — SIGNIFICANT CHANGE UP (ref 150–400)
PLATELET # BLD AUTO: 399 K/UL — SIGNIFICANT CHANGE UP (ref 150–400)
POTASSIUM SERPL-MCNC: 4.6 MMOL/L — SIGNIFICANT CHANGE UP (ref 3.5–5.3)
POTASSIUM SERPL-MCNC: 4.6 MMOL/L — SIGNIFICANT CHANGE UP (ref 3.5–5.3)
POTASSIUM SERPL-SCNC: 4.6 MMOL/L — SIGNIFICANT CHANGE UP (ref 3.5–5.3)
POTASSIUM SERPL-SCNC: 4.6 MMOL/L — SIGNIFICANT CHANGE UP (ref 3.5–5.3)
PROT SERPL-MCNC: 6.6 G/DL — SIGNIFICANT CHANGE UP (ref 6–8.3)
PROT SERPL-MCNC: 6.6 G/DL — SIGNIFICANT CHANGE UP (ref 6–8.3)
RBC # BLD: 3.98 M/UL — SIGNIFICANT CHANGE UP (ref 3.8–5.2)
RBC # BLD: 3.98 M/UL — SIGNIFICANT CHANGE UP (ref 3.8–5.2)
RBC # FLD: 13.3 % — SIGNIFICANT CHANGE UP (ref 10.3–14.5)
RBC # FLD: 13.3 % — SIGNIFICANT CHANGE UP (ref 10.3–14.5)
SODIUM SERPL-SCNC: 139 MMOL/L — SIGNIFICANT CHANGE UP (ref 135–145)
SODIUM SERPL-SCNC: 139 MMOL/L — SIGNIFICANT CHANGE UP (ref 135–145)
WBC # BLD: 8.54 K/UL — SIGNIFICANT CHANGE UP (ref 3.8–10.5)
WBC # BLD: 8.54 K/UL — SIGNIFICANT CHANGE UP (ref 3.8–10.5)
WBC # FLD AUTO: 8.54 K/UL — SIGNIFICANT CHANGE UP (ref 3.8–10.5)
WBC # FLD AUTO: 8.54 K/UL — SIGNIFICANT CHANGE UP (ref 3.8–10.5)

## 2023-10-28 LAB
CANCER AG19-9 SERPL-ACNC: 9 U/ML — SIGNIFICANT CHANGE UP
CANCER AG19-9 SERPL-ACNC: 9 U/ML — SIGNIFICANT CHANGE UP

## 2023-10-31 NOTE — HISTORY OF PRESENT ILLNESS
[FreeTextEntry1] : Ms. Garcia, 47 years old, G0,with early menopause (chemotherapy-induced) is referred by Dr. Juni Howell, for consideration of risk-reducing surgery for a pathogenic BRCA2 mutation.\par \par SHe is amenorrheic since chemo, sexually active in monogamous relationship x 5 years, had a Liletta IUD inserted at [facility] 2 years ago.\par She denies dyspareunia, postcoital bleeding, bloating, pain, N/V, weight gain/loss or any other associated signs or symptoms. \par She reports frequent BV infections as well as vulvar pruritis for which she uses clobetasol PRN. \par Remote h/o abnormal pap smear s/p cryosurgery age 20's, all PAPs normal since, per patient.\par \par She was diagnosed with pancreatic cancer in 10/2021. She had been worked up by her gastroenterologist outpatient who had ordered an abdominal ultrasound. Ultrasound at that time showed a pancreatic mass. Subsequent CT abd/pelvis showed a pancreatic tail mass with splenic encasement. Patient was scheduled for a EGD with biopsy with Dr. Campbell however patient was in acute distress and presented to ED on 10/10. Gastroenterology was consulted and patient was seen by Dr. Campbell who performed a EUS FNB. 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 Hem/Onc was consulted and patient was evaluated with Dr. Mendez who did not recommend anti-coagulation because the thrombosis was a direct result of the tumor encasement. \par Her case was presented at GI Tumor board and a 12 cycle course of Folfirinox prior to surgical resection was planned. CXR and CT Abdomen indicated no metastatic disease to the lungs or liver. \par Pancreas tail biopsy 10/11/2021- PATH: Poorly differentiated carcinoma\par She was started on neoadjuvant chemotherapy with FOLFIRINOX on 10/19/2021.  \par CT C/A/P performed on 21 revealed Pancreatic tail mass decreased in size since 10/11/2021. Apparent new occlusion splenic artery with extensive splenic infarction. Encasement left gastric artery and celiac axis. Splenic vein occlusion.\par Re-staging CT C/A/P performed on 2/15/2022 showed further decrease in size of the pancreatic tail carcinoma as described with focal abutment less than 180 degrees of proximal celiac artery and proximal common hepatic artery by ill-defined peripancreatic low-attenuation which could represent neurovascular extent of disease versus pancreatitis or treatment-related changes. No venous encasement involving superior mesenteric vein or main portal vein. Similar extent of infiltrative low-attenuation which abuts the left adrenal gland and left celiac ganglion. Acute colitis involving the ascending transverse and descending colon.  No imaging evidence of metastatic disease involving the chest, abdomen or pelvis. \par 3/14/2022- Started XRT x 5 weeks with Xeloda (w/ Lourdes Zinkin)- 22- XRT completed \par 5/10/22- Received 3 vaccines in preparation for splenectomy (Bexsero, Menactra, Hib)\par 2022- CT A/P - infiltrative pancreatic tail mass minimally enlarged since 2/15/22 with occlusion of splenic artery and vein and partial encasement celiac axis and left hepatic artery as previously demonstrated.  Size: Approximately 2.7 x 1.7 cm, previously 2.3 x 1.6 cm. \par \par ***SURGERY 22: Diagnostic laparoscopy, exlap, distal pancreatectomy, splenectomy, left adrenalectomy. No metastases on diagnostic lap.\par *** PATHOLOGY:\par Pancreas, spleen and left adrenal gland, distal pancreatectomy,\par splenectomy, and left adrenalectomy\par - Negative for residual carcinoma (Complete response, score 0)\par - Twenty-nine lymph nodes negative for tumor (0/29)\par - Pathologic stage (AJCC 8th Ed.) [id number]\par \par 22- She finished chemotherapy 2022, with neuropathy symptoms.\par 22- CT RITESH\par Breast MRI 23- right axillary adenopathy w/ cortical thickening, new since CT 22 (BIRADS 4b).\par Right breast ultrasound performed 23 revealed an abnormal right axillary LN corresponding with a LN noted on MRI (BIRADS 4a). She is status post  biopsy on 23 demonstrated fragments of benign reactive lymph node. *Benign and concordant, recommend follow up ultrasound in one year.\par \par 23- Returns to discuss biopsy findings.  She denies any palpable breast mass, nipple discharge, inversion or skin change.  She would like to schedule an consult with gyn. oncology as she is ready to proceed with a risk reduction BSO.  She is not ready to undergo risk reduction mastectomies at this time and prefers to continue close observation with mammogram/sonogram and breast MRI.  She has a colonoscopy scheduled in May 2023 and a dermatology evaluation scheduled in 2023. \par \par \par Genetics: Her aunt was diagnosed with a BRCA2 mutation in 2021 and this led Ann to pursue testing and further workup. \par 10/18/21 - [facility] Comprehensive genetic panel - BRCA 2 ( heterogenous for pathogenic variant in BRCA 2) \par \par \par \par PMH: Depression, hypothyroidism, BRCA 2+, \par PSH:  EGD/EUS\par Social Hx: Never a smoker, social alcohol use\par \par \par Imaging:\par 22 CT C/A/P ([facility])\par CHEST: LUNGS AND LARGE AIRWAYS: Patent central airways. No pulmonary nodules. Bibasilar and right middle lobe linear atelectasis.\par PLEURA: No pleural effusion.\par VESSELS: Within normal limits.\par HEART: Heart size is normal. No pericardial effusion.\par MEDIASTINUM AND FIDENCIO: No lymphadenopathy.\par CHEST WALL AND LOWER NECK: Right chest wall Mediport with tip at the cavoatrial junction.\par ABDOMEN AND PELVIS:\par LIVER: Within normal limits.\par BILE DUCTS: Normal caliber.\par GALLBLADDER: Within normal limits.\par SPLEEN: Splenectomy.\par PANCREAS: Status post distal pancreatectomy with expected postoperative changes. Remaining portions of the pancreas are within normal limits.\par ADRENALS: Status post left adrenalectomy. Right adrenal gland within normal limits.\par KIDNEYS/URETERS: A few subcentimeter hypodense foci, too small to characterize.\par BLADDER: Within normal limits\par REPRODUCTIVE ORGANS: IUD is in place.\par BOWEL: No bowel obstruction.\par PERITONEUM: No ascites.\par VESSELS: SMV, portal and hepatic veins are patent.\par RETROPERITONEUM/LYMPH NODES: No lymphadenopathy.\par ABDOMINAL WALL: Postsurgical changes.\par BONES: Within normal limits.\par IMPRESSION:\par Postsurgical changes status post distal pancreatectomy, left adrenalectomy and splenectomy. No evidence of recurrent disease.\par \par Next CT 23\par No pelvic sono.\par \par \par Health Maintenance:\par BMI:29\par Lifestyle: single; sexually active; lives with mom\par Gardasil Vaccine received: NA\par COVID vaccine:  did not receive\par Mammogram: 2023 BIRAD 4 biopsy negative Bilateral MRI negative; She is not ready to undergo risk reduction mastectomies at this time and prefers to continue close observation with mammogram/sonogram and breast MRI.\par Colonoscopy: scheduled 5/15/2023\par PAP negative per patient, [facility].\par  Purse String (Simple) Text: Given the location of the defect and the characteristics of the surrounding skin a purse string simple closure was deemed most appropriate.  Undermining was performed circumferentially around the surgical defect.  A purse string suture was then placed and tightened.

## 2023-11-02 ENCOUNTER — APPOINTMENT (OUTPATIENT)
Dept: SURGICAL ONCOLOGY | Facility: CLINIC | Age: 48
End: 2023-11-02
Payer: COMMERCIAL

## 2023-11-02 VITALS
TEMPERATURE: 97.8 F | DIASTOLIC BLOOD PRESSURE: 82 MMHG | OXYGEN SATURATION: 96 % | BODY MASS INDEX: 30.86 KG/M2 | WEIGHT: 174.19 LBS | HEART RATE: 67 BPM | HEIGHT: 63 IN | SYSTOLIC BLOOD PRESSURE: 123 MMHG

## 2023-11-02 PROCEDURE — 99214 OFFICE O/P EST MOD 30 MIN: CPT

## 2024-01-09 ENCOUNTER — OUTPATIENT (OUTPATIENT)
Dept: OUTPATIENT SERVICES | Facility: HOSPITAL | Age: 49
LOS: 1 days | Discharge: HOME | End: 2024-01-09

## 2024-01-09 DIAGNOSIS — Z45.2 ENCOUNTER FOR ADJUSTMENT AND MANAGEMENT OF VASCULAR ACCESS DEVICE: Chronic | ICD-10-CM

## 2024-01-09 DIAGNOSIS — Z98.890 OTHER SPECIFIED POSTPROCEDURAL STATES: Chronic | ICD-10-CM

## 2024-01-09 DIAGNOSIS — C25.9 MALIGNANT NEOPLASM OF PANCREAS, UNSPECIFIED: ICD-10-CM

## 2024-01-17 ENCOUNTER — RESULT REVIEW (OUTPATIENT)
Age: 49
End: 2024-01-17

## 2024-01-17 ENCOUNTER — APPOINTMENT (OUTPATIENT)
Dept: HEMATOLOGY ONCOLOGY | Facility: CLINIC | Age: 49
End: 2024-01-17
Payer: COMMERCIAL

## 2024-01-17 DIAGNOSIS — Z15.01 GENETIC SUSCEPTIBILITY TO MALIGNANT NEOPLASM OF BREAST: ICD-10-CM

## 2024-01-17 LAB
BASOPHILS # BLD AUTO: 0.08 K/UL — SIGNIFICANT CHANGE UP (ref 0–0.2)
BASOPHILS NFR BLD AUTO: 0.8 % — SIGNIFICANT CHANGE UP (ref 0–2)
EOSINOPHIL # BLD AUTO: 0.48 K/UL — SIGNIFICANT CHANGE UP (ref 0–0.5)
EOSINOPHIL NFR BLD AUTO: 4.9 % — SIGNIFICANT CHANGE UP (ref 0–6)
HCT VFR BLD CALC: 37.4 % — SIGNIFICANT CHANGE UP (ref 34.5–45)
HGB BLD-MCNC: 12.5 G/DL — SIGNIFICANT CHANGE UP (ref 11.5–15.5)
IMM GRANULOCYTES NFR BLD AUTO: 0.2 % — SIGNIFICANT CHANGE UP (ref 0–0.9)
LYMPHOCYTES # BLD AUTO: 4.18 K/UL — HIGH (ref 1–3.3)
LYMPHOCYTES # BLD AUTO: 42.5 % — SIGNIFICANT CHANGE UP (ref 13–44)
MCHC RBC-ENTMCNC: 29.6 PG — SIGNIFICANT CHANGE UP (ref 27–34)
MCHC RBC-ENTMCNC: 33.4 GM/DL — SIGNIFICANT CHANGE UP (ref 32–36)
MCV RBC AUTO: 88.6 FL — SIGNIFICANT CHANGE UP (ref 80–100)
MONOCYTES # BLD AUTO: 0.87 K/UL — SIGNIFICANT CHANGE UP (ref 0–0.9)
MONOCYTES NFR BLD AUTO: 8.8 % — SIGNIFICANT CHANGE UP (ref 2–14)
NEUTROPHILS # BLD AUTO: 4.21 K/UL — SIGNIFICANT CHANGE UP (ref 1.8–7.4)
NEUTROPHILS NFR BLD AUTO: 42.8 % — LOW (ref 43–77)
NRBC # BLD: 0 /100 WBCS — SIGNIFICANT CHANGE UP (ref 0–0)
PLATELET # BLD AUTO: 825 K/UL — HIGH (ref 150–400)
RBC # BLD: 4.22 M/UL — SIGNIFICANT CHANGE UP (ref 3.8–5.2)
RBC # FLD: 13.4 % — SIGNIFICANT CHANGE UP (ref 10.3–14.5)
WBC # BLD: 9.84 K/UL — SIGNIFICANT CHANGE UP (ref 3.8–10.5)
WBC # FLD AUTO: 9.84 K/UL — SIGNIFICANT CHANGE UP (ref 3.8–10.5)

## 2024-01-17 PROCEDURE — 99214 OFFICE O/P EST MOD 30 MIN: CPT

## 2024-01-17 NOTE — HISTORY OF PRESENT ILLNESS
[Disease: _____________________] : Disease: [unfilled] [T: ___] : T[unfilled] [N: ___] : N[unfilled] [AJCC Stage: ____] : AJCC Stage: [unfilled] [de-identified] : BOUCHRA PALACIOS is a 45 y.o. with a PMH significant for hypothyroidism, in Oct 2021 diagnosed with  a clinical stage III pancreatic cancer. Genetic testing - BRCA 2 positive.  ~8/2021 - Presented at age 45 with abdominal pains.  Saw PCP, referred to GI Dr. Maldonado.  9/30/21 - Abd US - 3.9 x 4.6 x 4.8cm heterogeneous mass in region of pancreatic tail.  10/7/21 - CT A/P - pancreatic tail mass with splenic artery encasement and splenic vein thrombosis ( tumor thrombus).  Borderline PALN's.  10/11/21 - 10/15/21 - Admit to  for severe pain.  Was set up for outpatient EGD with biopsy but had severe pains, nausea, dry-heaving and so went to ER.  10/11/21 - CT Chest - no evidence of disease. 10/11/21 - MRCP - tumor encases splenic artery, celiac artery contact <180, splenic vein occlusion.  Possible retroperitoneal extension with left adrenal gland contact and possible extension into left celiac ganglion. 10/12/21 - EUS, FNA pancreatic mass - scanty specimen - PATH - poorly differentiated carcinoma.   Neoadjuvant FOLFIRINOX x 8 10/19/21 - 2/15/21   CT CAP 2/15/22 ( post 8 cycles of  Folfirinox) further decrease in pancreatic mass, no new areas of disease.  Neoadjuvant chemoRT to  further improve resectability .  Completed dose  5000 cGy to pancreas  on 4/18/22 ( Dr Tobar) Concurrent capecitabine- stopped due to worsening neuropathies   5/27/22 Distal pancreatectomy, splenectomy, left adrenalectomy  : complete response, no residual cancer. 29 lymph nodes negative .  ( Dr Howell)   Adjuvant FOLFIRINOX x 4 ( to complete total 12 cycles of perioperative chemotherapy) completed 8/30/22.  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

## 2024-01-17 NOTE — ASSESSMENT
[FreeTextEntry1] : 47 y/o female BRCA 2 mutation carrier in 2021 diagnosed with  clinical stage III pancreatic adenocarcinoma.  S/p 8 cycles of neodjuvant FOLFIRINOX.  S/p neoadjuvant RT concurrent capecitabine to further improve resectability.   5/27/22 distal pancreatectomy, splenectomy L adrenalectomy , LN dissection. Complete pathologic response.  Adjuvant chemotherapy - FOLFIRINOX x 4 more cycles for a total of 12 cycles of cristino ( pre and post) operative chemotherapy. Completed 8/30/22.  Continue monitoring- exams, tumor marker and scans.  Last scans CT CAP August 2023 RITESH. Next scans in February 2024.  BRCA 2 positive. S/p prophylactic b/l salpingo-oophorectomy [facility] 2023. S/p prophylactic bilateral mastectomies with CELINA reconstruction planned Dec 2023  Follows with  dermatology for comprehensive skin exam Had colonoscopy.  recovering from recent surgeries. otherwise doing well.  Due for scans in February  Exam in 4 months/ sooner PRN.

## 2024-01-17 NOTE — REVIEW OF SYSTEMS
[Patient Intake Form Reviewed] : Patient intake form was reviewed [Negative] : Allergic/Immunologic [Fatigue] : fatigue [FreeTextEntry7] : I  [de-identified] : numbness occasional tingling in feet post chemo  stable

## 2024-01-17 NOTE — PHYSICAL EXAM
[Restricted in physically strenuous activity but ambulatory and able to carry out work of a light or sedentary nature] : Status 1- Restricted in physically strenuous activity but ambulatory and able to carry out work of a light or sedentary nature, e.g., light house work, office work [Normal] : affect appropriate [de-identified] : looks fatigued  [de-identified] : s/p bilat mastectomies with CELINA incisions healed  [de-identified] : has compression garment

## 2024-01-18 LAB
ALBUMIN SERPL ELPH-MCNC: 4.6 G/DL — SIGNIFICANT CHANGE UP (ref 3.3–5)
ALP SERPL-CCNC: 108 U/L — SIGNIFICANT CHANGE UP (ref 40–120)
ALT FLD-CCNC: 16 U/L — SIGNIFICANT CHANGE UP (ref 10–45)
ANION GAP SERPL CALC-SCNC: 11 MMOL/L — SIGNIFICANT CHANGE UP (ref 5–17)
AST SERPL-CCNC: 19 U/L — SIGNIFICANT CHANGE UP (ref 10–40)
BILIRUB SERPL-MCNC: 0.5 MG/DL — SIGNIFICANT CHANGE UP (ref 0.2–1.2)
BUN SERPL-MCNC: 17 MG/DL — SIGNIFICANT CHANGE UP (ref 7–23)
CALCIUM SERPL-MCNC: 10.5 MG/DL — SIGNIFICANT CHANGE UP (ref 8.4–10.5)
CANCER AG19-9 SERPL-ACNC: 7 U/ML — SIGNIFICANT CHANGE UP
CHLORIDE SERPL-SCNC: 101 MMOL/L — SIGNIFICANT CHANGE UP (ref 96–108)
CO2 SERPL-SCNC: 26 MMOL/L — SIGNIFICANT CHANGE UP (ref 22–31)
CREAT SERPL-MCNC: 0.69 MG/DL — SIGNIFICANT CHANGE UP (ref 0.5–1.3)
EGFR: 108 ML/MIN/1.73M2 — SIGNIFICANT CHANGE UP
GLUCOSE SERPL-MCNC: 89 MG/DL — SIGNIFICANT CHANGE UP (ref 70–99)
POTASSIUM SERPL-MCNC: 5.6 MMOL/L — HIGH (ref 3.5–5.3)
POTASSIUM SERPL-SCNC: 5.6 MMOL/L — HIGH (ref 3.5–5.3)
PROT SERPL-MCNC: 7.5 G/DL — SIGNIFICANT CHANGE UP (ref 6–8.3)
SODIUM SERPL-SCNC: 139 MMOL/L — SIGNIFICANT CHANGE UP (ref 135–145)

## 2024-01-27 ENCOUNTER — OUTPATIENT (OUTPATIENT)
Dept: OUTPATIENT SERVICES | Facility: HOSPITAL | Age: 49
LOS: 1 days | End: 2024-01-27
Payer: COMMERCIAL

## 2024-01-27 ENCOUNTER — APPOINTMENT (OUTPATIENT)
Dept: CT IMAGING | Facility: CLINIC | Age: 49
End: 2024-01-27
Payer: COMMERCIAL

## 2024-01-27 DIAGNOSIS — C25.9 MALIGNANT NEOPLASM OF PANCREAS, UNSPECIFIED: ICD-10-CM

## 2024-01-27 DIAGNOSIS — Z45.2 ENCOUNTER FOR ADJUSTMENT AND MANAGEMENT OF VASCULAR ACCESS DEVICE: Chronic | ICD-10-CM

## 2024-01-27 DIAGNOSIS — Z98.890 OTHER SPECIFIED POSTPROCEDURAL STATES: Chronic | ICD-10-CM

## 2024-01-27 PROCEDURE — 74177 CT ABD & PELVIS W/CONTRAST: CPT | Mod: 26

## 2024-01-27 PROCEDURE — 71260 CT THORAX DX C+: CPT | Mod: 26

## 2024-01-27 PROCEDURE — 71260 CT THORAX DX C+: CPT

## 2024-01-27 PROCEDURE — 74177 CT ABD & PELVIS W/CONTRAST: CPT

## 2024-02-23 ENCOUNTER — OFFICE (OUTPATIENT)
Dept: URBAN - METROPOLITAN AREA CLINIC 102 | Facility: CLINIC | Age: 49
Setting detail: OPHTHALMOLOGY
End: 2024-02-23
Payer: COMMERCIAL

## 2024-02-23 ENCOUNTER — RX ONLY (RX ONLY)
Age: 49
End: 2024-02-23

## 2024-02-23 DIAGNOSIS — B88.0: ICD-10-CM

## 2024-02-23 DIAGNOSIS — H01.001: ICD-10-CM

## 2024-02-23 DIAGNOSIS — H10.45: ICD-10-CM

## 2024-02-23 DIAGNOSIS — H01.004: ICD-10-CM

## 2024-02-23 PROCEDURE — 99213 OFFICE O/P EST LOW 20 MIN: CPT | Performed by: STUDENT IN AN ORGANIZED HEALTH CARE EDUCATION/TRAINING PROGRAM

## 2024-02-23 ASSESSMENT — REFRACTION_CURRENTRX
OD_SPHERE: -2.25
OS_SPHERE: -2.75
OD_VPRISM_DIRECTION: SV
OD_AXIS: 111
OS_AXIS: 065
OD_SPHERE: -2.25
OD_OVR_VA: 20/
OD_CYLINDER: -0.25
OD_AXIS: 095
OS_CYLINDER: -0.50
OS_OVR_VA: 20/
OS_VPRISM_DIRECTION: SV
OD_OVR_VA: 20/
OD_CYLINDER: -0.50
OS_CYLINDER: -0.75
OS_SPHERE: -2.25
OS_AXIS: 079
OS_OVR_VA: 20/
OD_VPRISM_DIRECTION: SV
OS_VPRISM_DIRECTION: SV

## 2024-02-23 ASSESSMENT — SPHEQUIV_DERIVED
OS_SPHEQUIV: -3.25
OD_SPHEQUIV: -3.125

## 2024-02-23 ASSESSMENT — REFRACTION_AUTOREFRACTION
OD_CYLINDER: -0.25
OD_SPHERE: -3.00
OD_AXIS: 131
OS_SPHERE: -2.75
OS_CYLINDER: -1.00
OS_AXIS: 088

## 2024-02-23 ASSESSMENT — CONFRONTATIONAL VISUAL FIELD TEST (CVF)
OD_FINDINGS: FULL
OS_FINDINGS: FULL

## 2024-02-23 ASSESSMENT — LID EXAM ASSESSMENTS
OD_BLEPHARITIS: 1+ 2+
OS_BLEPHARITIS: 1+ 2+

## 2024-03-19 ENCOUNTER — OFFICE (OUTPATIENT)
Dept: URBAN - METROPOLITAN AREA CLINIC 102 | Facility: CLINIC | Age: 49
Setting detail: OPHTHALMOLOGY
End: 2024-03-19
Payer: COMMERCIAL

## 2024-03-19 DIAGNOSIS — H01.134: ICD-10-CM

## 2024-03-19 DIAGNOSIS — H01.001: ICD-10-CM

## 2024-03-19 DIAGNOSIS — H01.132: ICD-10-CM

## 2024-03-19 DIAGNOSIS — H01.131: ICD-10-CM

## 2024-03-19 DIAGNOSIS — H01.135: ICD-10-CM

## 2024-03-19 DIAGNOSIS — B88.0: ICD-10-CM

## 2024-03-19 DIAGNOSIS — H01.004: ICD-10-CM

## 2024-03-19 PROCEDURE — 92012 INTRM OPH EXAM EST PATIENT: CPT | Performed by: OPHTHALMOLOGY

## 2024-03-19 ASSESSMENT — REFRACTION_CURRENTRX
OD_CYLINDER: -0.50
OS_SPHERE: -2.25
OS_OVR_VA: 20/
OS_VPRISM_DIRECTION: SV
OS_VPRISM_DIRECTION: SV
OD_SPHERE: -2.25
OD_CYLINDER: -0.25
OD_VPRISM_DIRECTION: SV
OS_AXIS: 065
OD_SPHERE: -2.25
OS_SPHERE: -2.75
OS_OVR_VA: 20/
OD_OVR_VA: 20/
OD_AXIS: 095
OS_CYLINDER: -0.75
OS_CYLINDER: -0.50
OD_AXIS: 111
OS_AXIS: 079
OD_OVR_VA: 20/
OD_VPRISM_DIRECTION: SV

## 2024-03-19 ASSESSMENT — LID EXAM ASSESSMENTS
OD_BLEPHARITIS: 1+ 2+
OS_BLEPHARITIS: 1+ 2+

## 2024-03-28 NOTE — H&P PST ADULT - NSANTHOSAYNRD_GEN_A_CORE
no edema,  no murmurs,  regular rate and rhythm
No. HAKAN screening performed.  STOP BANG Legend: 0-2 = LOW Risk; 3-4 = INTERMEDIATE Risk; 5-8 = HIGH Risk

## 2024-05-17 ENCOUNTER — OUTPATIENT (OUTPATIENT)
Dept: OUTPATIENT SERVICES | Facility: HOSPITAL | Age: 49
LOS: 1 days | Discharge: HOME | End: 2024-05-17

## 2024-05-17 DIAGNOSIS — Z45.2 ENCOUNTER FOR ADJUSTMENT AND MANAGEMENT OF VASCULAR ACCESS DEVICE: Chronic | ICD-10-CM

## 2024-05-17 DIAGNOSIS — C25.9 MALIGNANT NEOPLASM OF PANCREAS, UNSPECIFIED: ICD-10-CM

## 2024-05-17 DIAGNOSIS — Z98.890 OTHER SPECIFIED POSTPROCEDURAL STATES: Chronic | ICD-10-CM

## 2024-05-20 ENCOUNTER — RESULT REVIEW (OUTPATIENT)
Age: 49
End: 2024-05-20

## 2024-05-20 ENCOUNTER — APPOINTMENT (OUTPATIENT)
Dept: HEMATOLOGY ONCOLOGY | Facility: CLINIC | Age: 49
End: 2024-05-20
Payer: COMMERCIAL

## 2024-05-20 VITALS
OXYGEN SATURATION: 96 % | DIASTOLIC BLOOD PRESSURE: 85 MMHG | HEART RATE: 80 BPM | HEIGHT: 63 IN | BODY MASS INDEX: 29.06 KG/M2 | SYSTOLIC BLOOD PRESSURE: 115 MMHG | WEIGHT: 164 LBS | TEMPERATURE: 98.4 F

## 2024-05-20 DIAGNOSIS — C25.9 MALIGNANT NEOPLASM OF PANCREAS, UNSPECIFIED: ICD-10-CM

## 2024-05-20 DIAGNOSIS — Z15.01 GENETIC SUSCEPTIBILITY TO MALIGNANT NEOPLASM OF BREAST: ICD-10-CM

## 2024-05-20 DIAGNOSIS — Z15.09 GENETIC SUSCEPTIBILITY TO MALIGNANT NEOPLASM OF BREAST: ICD-10-CM

## 2024-05-20 LAB
BASOPHILS # BLD AUTO: 0.09 K/UL — SIGNIFICANT CHANGE UP (ref 0–0.2)
BASOPHILS NFR BLD AUTO: 0.9 % — SIGNIFICANT CHANGE UP (ref 0–2)
EOSINOPHIL # BLD AUTO: 0.37 K/UL — SIGNIFICANT CHANGE UP (ref 0–0.5)
EOSINOPHIL NFR BLD AUTO: 3.5 % — SIGNIFICANT CHANGE UP (ref 0–6)
HCT VFR BLD CALC: 38.9 % — SIGNIFICANT CHANGE UP (ref 34.5–45)
HGB BLD-MCNC: 13.1 G/DL — SIGNIFICANT CHANGE UP (ref 11.5–15.5)
IMM GRANULOCYTES NFR BLD AUTO: 0.3 % — SIGNIFICANT CHANGE UP (ref 0–0.9)
LYMPHOCYTES # BLD AUTO: 4.33 K/UL — HIGH (ref 1–3.3)
LYMPHOCYTES # BLD AUTO: 41.4 % — SIGNIFICANT CHANGE UP (ref 13–44)
MCHC RBC-ENTMCNC: 29.3 PG — SIGNIFICANT CHANGE UP (ref 27–34)
MCHC RBC-ENTMCNC: 33.7 GM/DL — SIGNIFICANT CHANGE UP (ref 32–36)
MCV RBC AUTO: 87 FL — SIGNIFICANT CHANGE UP (ref 80–100)
MONOCYTES # BLD AUTO: 1.01 K/UL — HIGH (ref 0–0.9)
MONOCYTES NFR BLD AUTO: 9.7 % — SIGNIFICANT CHANGE UP (ref 2–14)
NEUTROPHILS # BLD AUTO: 4.63 K/UL — SIGNIFICANT CHANGE UP (ref 1.8–7.4)
NEUTROPHILS NFR BLD AUTO: 44.2 % — SIGNIFICANT CHANGE UP (ref 43–77)
NRBC # BLD: 0 /100 WBCS — SIGNIFICANT CHANGE UP (ref 0–0)
PLATELET # BLD AUTO: 591 K/UL — HIGH (ref 150–400)
RBC # BLD: 4.47 M/UL — SIGNIFICANT CHANGE UP (ref 3.8–5.2)
RBC # FLD: 15.4 % — HIGH (ref 10.3–14.5)
WBC # BLD: 10.2 K/UL — SIGNIFICANT CHANGE UP (ref 3.8–10.5)
WBC # FLD AUTO: 10.2 K/UL — SIGNIFICANT CHANGE UP (ref 3.8–10.5)

## 2024-05-20 PROCEDURE — 99214 OFFICE O/P EST MOD 30 MIN: CPT

## 2024-05-20 RX ORDER — LORAZEPAM 2 MG/1
2 TABLET ORAL
Qty: 30 | Refills: 0 | Status: ACTIVE | COMMUNITY
Start: 2024-01-17 | End: 1900-01-01

## 2024-05-20 NOTE — PHYSICAL EXAM
[Fully active, able to carry on all pre-disease performance without restriction] : Status 0 - Fully active, able to carry on all pre-disease performance without restriction [Normal] : well developed, well nourished, in no acute distress [de-identified] : looks great  [de-identified] : s/p bilat mastectomies with CELINA reconstruction

## 2024-05-20 NOTE — HISTORY OF PRESENT ILLNESS
[Disease: _____________________] : Disease: [unfilled] [T: ___] : T[unfilled] [AJCC Stage: ____] : AJCC Stage: [unfilled] [N: ___] : N[unfilled] [de-identified] : BOUCHRA PALACIOS is a 45 y.o. with a PMH significant for hypothyroidism, in Oct 2021 diagnosed with  a clinical stage III pancreatic cancer. Genetic testing - BRCA 2 positive.  ~8/2021 - Presented at age 45 with abdominal pains.  Saw PCP, referred to GI Dr. Maldonado.  9/30/21 - Abd US - 3.9 x 4.6 x 4.8cm heterogeneous mass in region of pancreatic tail.  10/7/21 - CT A/P - pancreatic tail mass with splenic artery encasement and splenic vein thrombosis ( tumor thrombus).  Borderline PALN's.  10/11/21 - 10/15/21 - Admit to  for severe pain.  Was set up for outpatient EGD with biopsy but had severe pains, nausea, dry-heaving and so went to ER.  10/11/21 - CT Chest - no evidence of disease. 10/11/21 - MRCP - tumor encases splenic artery, celiac artery contact <180, splenic vein occlusion.  Possible retroperitoneal extension with left adrenal gland contact and possible extension into left celiac ganglion. 10/12/21 - EUS, FNA pancreatic mass - scanty specimen - PATH - poorly differentiated carcinoma.   Neoadjuvant FOLFIRINOX x 8 10/19/21 - 2/15/21   CT CAP 2/15/22 ( post 8 cycles of  Folfirinox) further decrease in pancreatic mass, no new areas of disease.  Neoadjuvant chemoRT to  further improve resectability .  Completed dose  5000 cGy to pancreas  on 4/18/22 ( Dr Tobar) Concurrent capecitabine- stopped due to worsening neuropathies   5/27/22 Distal pancreatectomy, splenectomy, left adrenalectomy  : complete response, no residual cancer. 29 lymph nodes negative .  ( Dr Howell)   Adjuvant FOLFIRINOX x 4 ( to complete total 12 cycles of perioperative chemotherapy) completed 8/30/22.  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 recovered form mastetcomies. Energy much better. Working full time. No GI c/o. Neuropathy- minimal  and improving.

## 2024-05-20 NOTE — ASSESSMENT
[FreeTextEntry1] : 49 y/o female BRCA 2 mutation carrier in 2021 diagnosed with clinical stage III pancreatic adenocarcinoma.  S/p 8 cycles of neodjuvant FOLFIRINOX.  S/p neoadjuvant RT and  concurrent capecitabine to further improve resectability.   5/27/22 distal pancreatectomy, splenectomy L adrenalectomy , LN dissection. Complete pathologic response.  Adjuvant chemotherapy - FOLFIRINOX x 4 more cycles for a total of 12 cycles of cristino ( pre and post) operative chemotherapy. Completed 8/30/22.  Continue monitoring- exams, tumor marker and scans.  Last scans CT CAP Jan 27, 2024  RITESH. Next scans in end of July 2024.  BRCA 2 positive. S/p prophylactic b/l salpingo-oophorectomy [facility] 2023. S/p prophylactic bilateral mastectomies with CELINA reconstruction  Dec 2023 Follows with dermatology for comprehensive skin exam Had colonoscopy.  Clinically doing well. Follows with surgical oncology, breast surgery, GYN and dermatology.  Return visit in August/ after scans / sooner PRN.

## 2024-05-20 NOTE — RESULTS/DATA
[FreeTextEntry1] : CT CAP 1/27/24 Interval bilateral mastectomy with reconstructive surgery. Small collection in the right axilla measuring 1.8 x 1.1 cm. No gross evidence for metastatic disease.

## 2024-05-20 NOTE — REVIEW OF SYSTEMS
[Patient Intake Form Reviewed] : Patient intake form was reviewed [Insomnia] : insomnia [Negative] : Constitutional [FreeTextEntry7] : I  [de-identified] : numbness occasional tingling in feet much better

## 2024-05-21 ENCOUNTER — APPOINTMENT (OUTPATIENT)
Dept: OBGYN | Facility: CLINIC | Age: 49
End: 2024-05-21
Payer: COMMERCIAL

## 2024-05-21 VITALS
BODY MASS INDEX: 29.23 KG/M2 | HEIGHT: 63 IN | HEART RATE: 78 BPM | WEIGHT: 165 LBS | SYSTOLIC BLOOD PRESSURE: 120 MMHG | RESPIRATION RATE: 16 BRPM | DIASTOLIC BLOOD PRESSURE: 74 MMHG

## 2024-05-21 DIAGNOSIS — Z12.39 ENCOUNTER FOR OTHER SCREENING FOR MALIGNANT NEOPLASM OF BREAST: ICD-10-CM

## 2024-05-21 DIAGNOSIS — Z12.4 ENCOUNTER FOR SCREENING FOR MALIGNANT NEOPLASM OF CERVIX: ICD-10-CM

## 2024-05-21 LAB
ALBUMIN SERPL ELPH-MCNC: 4.3 G/DL — SIGNIFICANT CHANGE UP (ref 3.3–5)
ALP SERPL-CCNC: 114 U/L — SIGNIFICANT CHANGE UP (ref 40–120)
ALT FLD-CCNC: 21 U/L — SIGNIFICANT CHANGE UP (ref 10–45)
ANION GAP SERPL CALC-SCNC: 14 MMOL/L — SIGNIFICANT CHANGE UP (ref 5–17)
AST SERPL-CCNC: 25 U/L — SIGNIFICANT CHANGE UP (ref 10–40)
BILIRUB SERPL-MCNC: 0.6 MG/DL — SIGNIFICANT CHANGE UP (ref 0.2–1.2)
BUN SERPL-MCNC: 16 MG/DL — SIGNIFICANT CHANGE UP (ref 7–23)
CALCIUM SERPL-MCNC: 9.9 MG/DL — SIGNIFICANT CHANGE UP (ref 8.4–10.5)
CANCER AG19-9 SERPL-ACNC: 6 U/ML — SIGNIFICANT CHANGE UP
CARD LOT #: NORMAL
CARD LOT EXP DATE: NORMAL
CHLORIDE SERPL-SCNC: 103 MMOL/L — SIGNIFICANT CHANGE UP (ref 96–108)
CO2 SERPL-SCNC: 24 MMOL/L — SIGNIFICANT CHANGE UP (ref 22–31)
CREAT SERPL-MCNC: 0.77 MG/DL — SIGNIFICANT CHANGE UP (ref 0.5–1.3)
DATE COLLECTED: NORMAL
DATE COLLECTED: NORMAL
DEVELOPER LOT #: NORMAL
DEVELOPER LOT EXP DATE: NORMAL
EGFR: 96 ML/MIN/1.73M2 — SIGNIFICANT CHANGE UP
GLUCOSE SERPL-MCNC: 89 MG/DL — SIGNIFICANT CHANGE UP (ref 70–99)
HEMOCCULT 2: NEGATIVE
HEMOCCULT SP1 STL QL: NEGATIVE
POTASSIUM SERPL-MCNC: 4.9 MMOL/L — SIGNIFICANT CHANGE UP (ref 3.5–5.3)
POTASSIUM SERPL-SCNC: 4.9 MMOL/L — SIGNIFICANT CHANGE UP (ref 3.5–5.3)
PROT SERPL-MCNC: 6.9 G/DL — SIGNIFICANT CHANGE UP (ref 6–8.3)
QUALITY CONTROL: YES
QUALITY CONTROL: YES
SODIUM SERPL-SCNC: 141 MMOL/L — SIGNIFICANT CHANGE UP (ref 135–145)

## 2024-05-21 PROCEDURE — 99396 PREV VISIT EST AGE 40-64: CPT

## 2024-05-21 PROCEDURE — 82270 OCCULT BLOOD FECES: CPT

## 2024-05-21 NOTE — PHYSICAL EXAM
[Chaperone Present] : A chaperone was present in the examining room during all aspects of the physical examination [Appropriately responsive] : appropriately responsive [Alert] : alert [No Acute Distress] : no acute distress [No Lymphadenopathy] : no lymphadenopathy [Soft] : soft [Non-tender] : non-tender [Oriented x3] : oriented x3 [Right Breast Absent] : a total mastectomy [Breast Reconstruction Right] : breast reconstruction [Left Breast Absent] : a total mastectomy [Breast Reconstruction Left] : breast reconstruction [Labia Majora] : normal [Labia Minora] : normal [Normal] : normal [Uterine Adnexae] : normal [No Tenderness] : no tenderness [Nl Sphincter Tone] : normal sphincter tone [FreeTextEntry4] : Pap taken  [FreeTextEntry9] : stool guiac neg

## 2024-05-21 NOTE — HISTORY OF PRESENT ILLNESS
[FreeTextEntry1] : 49 yo  with LMP  presents today for annual gyn visit.   c/o vaginal dryness and hot flashes ~ 2x/week  Pancreatic cancer survivor  Menstrual triad: 14 x 28 x 5   OB age 16 TOP surgical  GYN: OCPs for 10 yrs, Nexplanon and Liletta for 3 yrs Abnormal Pap in 20s, cryotherapy BSO  Med: Pancreatic cancer - stage 3, neoadjuvant chemo, RAMP Radical antegrade pancreatectomy Splenectomy surgery splenectomy, left adrenal, tail of pancreas Hashimotos Menopausal BRAC 2 pos  Sx: CELINA for BRCA2 gene positivity BSO for BRCA2 gene positivity   [Mammogramdate] : 8/2023 [PapSmeardate] : 2023 [TextBox_31] : NL, no HPV [ColonoscopyDate] : 2023

## 2024-05-21 NOTE — DISCUSSION/SUMMARY
[FreeTextEntry1] : Health Maintenance:  -Pap with HPV -colonoscopy guidelines reviewed with patient -Achieve Vit D levels of 30-40, intake of 1100 mg daily calcium mostly thru dark green leafy greens and milk products, exercise 30 minutes TIW  Vaginal dryness -Discussed R/B/SE/AE -Rx Estradiol cream BIW also discussed benefits of coconut oil -RTO 3 months   Hot flashes -Currently on Lexapro, discussed benefits of SSRIs helping hot flashes -only having three weekly. Clive has some side effects possibly incompatible with her prior sx.  -RTO 3 months

## 2024-05-22 DIAGNOSIS — Z15.01 GENETIC SUSCEPTIBILITY TO MALIGNANT NEOPLASM OF BREAST: ICD-10-CM

## 2024-05-22 LAB — HPV HIGH+LOW RISK DNA PNL CVX: NOT DETECTED

## 2024-05-23 LAB — CYTOLOGY CVX/VAG DOC THIN PREP: ABNORMAL

## 2024-06-05 DIAGNOSIS — N89.8 OTHER SPECIFIED NONINFLAMMATORY DISORDERS OF VAGINA: ICD-10-CM

## 2024-06-05 RX ORDER — ESTRADIOL 0.1 MG/G
0.1 CREAM VAGINAL
Qty: 1 | Refills: 1 | Status: ACTIVE | COMMUNITY
Start: 2024-06-05 | End: 1900-01-01

## 2024-07-22 ENCOUNTER — APPOINTMENT (OUTPATIENT)
Dept: CT IMAGING | Facility: CLINIC | Age: 49
End: 2024-07-22
Payer: COMMERCIAL

## 2024-07-22 PROCEDURE — 71260 CT THORAX DX C+: CPT | Mod: 26

## 2024-07-22 PROCEDURE — 74177 CT ABD & PELVIS W/CONTRAST: CPT | Mod: 26

## 2024-08-06 ENCOUNTER — OUTPATIENT (OUTPATIENT)
Dept: OUTPATIENT SERVICES | Facility: HOSPITAL | Age: 49
LOS: 1 days | Discharge: HOME | End: 2024-08-06

## 2024-08-06 DIAGNOSIS — Z45.2 ENCOUNTER FOR ADJUSTMENT AND MANAGEMENT OF VASCULAR ACCESS DEVICE: Chronic | ICD-10-CM

## 2024-08-06 DIAGNOSIS — C25.9 MALIGNANT NEOPLASM OF PANCREAS, UNSPECIFIED: ICD-10-CM

## 2024-08-06 DIAGNOSIS — Z98.890 OTHER SPECIFIED POSTPROCEDURAL STATES: Chronic | ICD-10-CM

## 2024-08-09 NOTE — REASON FOR VISIT
[Follow-Up Visit] : a follow-up [Other: _____] : [unfilled] [FreeTextEntry2] : Pancreatic Cancer , BRCA 2 positive  coffee

## 2024-08-12 ENCOUNTER — APPOINTMENT (OUTPATIENT)
Dept: HEMATOLOGY ONCOLOGY | Facility: CLINIC | Age: 49
End: 2024-08-12
Payer: COMMERCIAL

## 2024-08-12 VITALS
BODY MASS INDEX: 29.23 KG/M2 | HEART RATE: 63 BPM | TEMPERATURE: 98.7 F | DIASTOLIC BLOOD PRESSURE: 77 MMHG | SYSTOLIC BLOOD PRESSURE: 113 MMHG | OXYGEN SATURATION: 96 % | WEIGHT: 165 LBS

## 2024-08-12 DIAGNOSIS — D70.1 AGRANULOCYTOSIS SECONDARY TO CANCER CHEMOTHERAPY: ICD-10-CM

## 2024-08-12 DIAGNOSIS — T45.1X5A TOXIC GASTROENTERITIS AND COLITIS: ICD-10-CM

## 2024-08-12 DIAGNOSIS — T45.1X5A AGRANULOCYTOSIS SECONDARY TO CANCER CHEMOTHERAPY: ICD-10-CM

## 2024-08-12 DIAGNOSIS — C25.9 MALIGNANT NEOPLASM OF PANCREAS, UNSPECIFIED: ICD-10-CM

## 2024-08-12 DIAGNOSIS — Z15.01 GENETIC SUSCEPTIBILITY TO MALIGNANT NEOPLASM OF BREAST: ICD-10-CM

## 2024-08-12 DIAGNOSIS — T45.1X5A NAUSEA WITH VOMITING, UNSPECIFIED: ICD-10-CM

## 2024-08-12 DIAGNOSIS — K52.1 TOXIC GASTROENTERITIS AND COLITIS: ICD-10-CM

## 2024-08-12 DIAGNOSIS — R11.2 NAUSEA WITH VOMITING, UNSPECIFIED: ICD-10-CM

## 2024-08-12 DIAGNOSIS — Z15.09 GENETIC SUSCEPTIBILITY TO MALIGNANT NEOPLASM OF BREAST: ICD-10-CM

## 2024-08-12 PROCEDURE — 99214 OFFICE O/P EST MOD 30 MIN: CPT

## 2024-08-12 NOTE — ASSESSMENT
[FreeTextEntry1] : 49 y/o female BRCA 2 mutation carrier in 2021 diagnosed with clinical stage III pancreatic adenocarcinoma.  S/p 8 cycles of neodjuvant FOLFIRINOX.  S/p neoadjuvant RT and  concurrent capecitabine to further improve resectability.   5/27/22 distal pancreatectomy, splenectomy L adrenalectomy , LN dissection. Complete pathologic response.  Adjuvant chemotherapy - FOLFIRINOX x 4 more cycles for a total of 12 cycles of cristino ( pre and post) operative chemotherapy. Completed 8/30/22.  Continue monitoring- exams, tumor marker and scans.  Last scans CT CAP  July 2024  RITESH. Next scans in January 2015. Plan for MRI of abdomen and pelvis with iv contrast ( to limit CT scan RT exposure) and non contrast CT chest Jan 2025.  BRCA 2 positive. S/p prophylactic b/l salpingo-oophorectomy [facility] 2023. S/p prophylactic bilateral mastectomies with CELINA reconstruction  Dec 2023 Follows with dermatology for comprehensive skin exam Had colonoscopy in May 2023- next in 3 yeasr ( 2026)   Clinically doing well. Follows with surgical oncology, breast surgery, GYN and dermatology.  Return visit in January 2025  after scans / sooner PRN.

## 2024-08-12 NOTE — RESULTS/DATA
[FreeTextEntry1] : CT CAP 7/22/24  Bilateral mastectomy with reconstructive surgery. Decreased collection in the right axilla measuring 1.4 x 1 cm. No gross evidence for metastatic disease or local recurrence.

## 2024-08-12 NOTE — PHYSICAL EXAM
[Fully active, able to carry on all pre-disease performance without restriction] : Status 0 - Fully active, able to carry on all pre-disease performance without restriction [Normal] : well developed, well nourished, in no acute distress [de-identified] : s/p bilat mastectomies with CELINA reconstruction

## 2024-08-12 NOTE — REVIEW OF SYSTEMS
[Patient Intake Form Reviewed] : Patient intake form was reviewed [Insomnia] : insomnia [Negative] : Allergic/Immunologic [Hot Flashes] : hot flashes [FreeTextEntry7] : I  [de-identified] : numbness occasional tingling in feet much better

## 2024-08-12 NOTE — HISTORY OF PRESENT ILLNESS
[Disease: _____________________] : Disease: [unfilled] [T: ___] : T[unfilled] [N: ___] : N[unfilled] [AJCC Stage: ____] : AJCC Stage: [unfilled] [de-identified] : BOUCHRA PALACIOS is a 45 y.o. with a PMH significant for hypothyroidism, in Oct 2021 diagnosed with  a clinical stage III pancreatic cancer. Genetic testing - BRCA 2 positive.  ~8/2021 - Presented at age 45 with abdominal pains.  Saw PCP, referred to GI Dr. Maldonado.  9/30/21 - Abd US - 3.9 x 4.6 x 4.8cm heterogeneous mass in region of pancreatic tail.  10/7/21 - CT A/P - pancreatic tail mass with splenic artery encasement and splenic vein thrombosis ( tumor thrombus).  Borderline PALN's.  10/11/21 - 10/15/21 - Admit to  for severe pain.  Was set up for outpatient EGD with biopsy but had severe pains, nausea, dry-heaving and so went to ER.  10/11/21 - CT Chest - no evidence of disease. 10/11/21 - MRCP - tumor encases splenic artery, celiac artery contact <180, splenic vein occlusion.  Possible retroperitoneal extension with left adrenal gland contact and possible extension into left celiac ganglion. 10/12/21 - EUS, FNA pancreatic mass - scanty specimen - PATH - poorly differentiated carcinoma.   Neoadjuvant FOLFIRINOX x 8 10/19/21 - 2/15/21   CT CAP 2/15/22 ( post 8 cycles of  Folfirinox) further decrease in pancreatic mass, no new areas of disease.  Neoadjuvant chemoRT to  further improve resectability .  Completed dose  5000 cGy to pancreas  on 4/18/22 ( Dr Tobar) Concurrent capecitabine- stopped due to worsening neuropathies   5/27/22 Distal pancreatectomy, splenectomy, left adrenalectomy  : complete response, no residual cancer. 29 lymph nodes negative .  ( Dr Howell)   Adjuvant FOLFIRINOX x 4 ( to complete total 12 cycles of perioperative chemotherapy) completed 8/30/22.  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  Energy much better. Working full time. No GI c/o. Neuropathy- minimal  and improving.

## 2024-08-16 ENCOUNTER — APPOINTMENT (OUTPATIENT)
Dept: OBGYN | Facility: CLINIC | Age: 49
End: 2024-08-16

## 2024-12-24 PROBLEM — F10.90 ALCOHOL USE: Status: ACTIVE | Noted: 2023-06-19

## 2025-01-13 ENCOUNTER — APPOINTMENT (OUTPATIENT)
Dept: CT IMAGING | Facility: CLINIC | Age: 50
End: 2025-01-13
Payer: COMMERCIAL

## 2025-01-13 ENCOUNTER — OUTPATIENT (OUTPATIENT)
Dept: OUTPATIENT SERVICES | Facility: HOSPITAL | Age: 50
LOS: 1 days | End: 2025-01-13
Payer: COMMERCIAL

## 2025-01-13 ENCOUNTER — APPOINTMENT (OUTPATIENT)
Dept: MRI IMAGING | Facility: CLINIC | Age: 50
End: 2025-01-13
Payer: COMMERCIAL

## 2025-01-13 DIAGNOSIS — Z98.890 OTHER SPECIFIED POSTPROCEDURAL STATES: Chronic | ICD-10-CM

## 2025-01-13 DIAGNOSIS — Z00.8 ENCOUNTER FOR OTHER GENERAL EXAMINATION: ICD-10-CM

## 2025-01-13 DIAGNOSIS — Z45.2 ENCOUNTER FOR ADJUSTMENT AND MANAGEMENT OF VASCULAR ACCESS DEVICE: Chronic | ICD-10-CM

## 2025-01-13 DIAGNOSIS — C25.9 MALIGNANT NEOPLASM OF PANCREAS, UNSPECIFIED: ICD-10-CM

## 2025-01-13 PROCEDURE — A9585: CPT

## 2025-01-13 PROCEDURE — 74183 MRI ABD W/O CNTR FLWD CNTR: CPT

## 2025-01-13 PROCEDURE — 71250 CT THORAX DX C-: CPT | Mod: 26

## 2025-01-13 PROCEDURE — 71250 CT THORAX DX C-: CPT

## 2025-01-13 PROCEDURE — 72197 MRI PELVIS W/O & W/DYE: CPT | Mod: 26

## 2025-01-13 PROCEDURE — 74183 MRI ABD W/O CNTR FLWD CNTR: CPT | Mod: 26

## 2025-01-13 PROCEDURE — 72197 MRI PELVIS W/O & W/DYE: CPT

## 2025-02-09 NOTE — DISCUSSION/SUMMARY
[Clean] : was clean [Dry] : was dry [Intact] : was intact [None] : had no drainage [Normal Skin] : normal appearance [Doing Well] : is doing well [Excellent Pain Control] : has excellent pain control [No Sign of Infection] : is showing no signs of infection [0] : 0 [Reviewed] : reviewed [Erythema] : was not erythematous [Ecchymosis] : was not ecchymotic [Seroma] : had no seroma [Firm] : soft [Tender] : nontender [Rebound] : no rebound tenderness [Guarding] : no guarding [Incisional Hernia] : no incisional hernia [Mass] : no palpable mass [Cervical Abnormality] : normal cervix [External Genitalia Abnormal] : normal external genitalia [Vaginal Exam Abnormal] : normal vaginal exam [de-identified] : /GI function WNL [de-identified] : regular diet. continue to maintain pelvic rest x 4 more weeks. refrain from heavy lifting and strenuous exercise x 4 more weeks.\par  , [FreeTextEntry1] : Final pathology is benign.\par PELVIC WASH\par NEGATIVE FOR MALIGNANT CELLS.\par Final Diagnosis\par 1. Endometrium; curettings:\par \par - Scant strips of inactive endometrial epithelium.\par \par 2. Ovary and tube, left; salpingo-oophorectomy:\par - Hemorrhagic corpus luteum.\par - Benign fallopian tube.\par \par 3. Ovary and tube, right; salpingo-oophorectomy:\par - Small hemorrhagic corpus luteum.\par - Benign fallopian tube.\par \par 4. Intrauterine device; removal:\par - For gross examination only.\par \par

## 2025-03-25 ENCOUNTER — NON-APPOINTMENT (OUTPATIENT)
Age: 50
End: 2025-03-25

## 2025-03-25 DIAGNOSIS — Z12.4 ENCOUNTER FOR SCREENING FOR MALIGNANT NEOPLASM OF CERVIX: ICD-10-CM

## 2025-03-25 DIAGNOSIS — Z85.07 PERSONAL HISTORY OF MALIGNANT NEOPLASM OF PANCREAS: ICD-10-CM

## 2025-03-25 DIAGNOSIS — I82.890 ACUTE EMBOLISM AND THROMBOSIS OF OTHER SPECIFIED VEINS: ICD-10-CM

## 2025-03-25 DIAGNOSIS — Z74.09 OTHER REDUCED MOBILITY: ICD-10-CM

## 2025-03-25 DIAGNOSIS — Z76.89 PERSONS ENCOUNTERING HEALTH SERVICES IN OTHER SPECIFIED CIRCUMSTANCES: ICD-10-CM

## 2025-03-25 DIAGNOSIS — Z01.818 ENCOUNTER FOR OTHER PREPROCEDURAL EXAMINATION: ICD-10-CM

## 2025-03-25 DIAGNOSIS — Z87.898 PERSONAL HISTORY OF OTHER SPECIFIED CONDITIONS: ICD-10-CM

## 2025-03-25 DIAGNOSIS — D73.5 INFARCTION OF SPLEEN: ICD-10-CM

## 2025-03-26 ENCOUNTER — OUTPATIENT (OUTPATIENT)
Dept: OUTPATIENT SERVICES | Facility: HOSPITAL | Age: 50
LOS: 1 days | Discharge: HOME | End: 2025-03-26

## 2025-03-26 DIAGNOSIS — Z45.2 ENCOUNTER FOR ADJUSTMENT AND MANAGEMENT OF VASCULAR ACCESS DEVICE: Chronic | ICD-10-CM

## 2025-03-26 DIAGNOSIS — Z98.890 OTHER SPECIFIED POSTPROCEDURAL STATES: Chronic | ICD-10-CM

## 2025-03-26 DIAGNOSIS — C25.9 MALIGNANT NEOPLASM OF PANCREAS, UNSPECIFIED: ICD-10-CM

## 2025-03-27 ENCOUNTER — RESULT REVIEW (OUTPATIENT)
Age: 50
End: 2025-03-27

## 2025-03-27 ENCOUNTER — APPOINTMENT (OUTPATIENT)
Dept: HEMATOLOGY ONCOLOGY | Facility: CLINIC | Age: 50
End: 2025-03-27

## 2025-03-27 LAB
BASOPHILS # BLD AUTO: 0.11 K/UL — SIGNIFICANT CHANGE UP (ref 0–0.2)
BASOPHILS NFR BLD AUTO: 1 % — SIGNIFICANT CHANGE UP (ref 0–2)
EOSINOPHIL # BLD AUTO: 0.72 K/UL — HIGH (ref 0–0.5)
EOSINOPHIL NFR BLD AUTO: 6.7 % — HIGH (ref 0–6)
HCT VFR BLD CALC: 42.7 % — SIGNIFICANT CHANGE UP (ref 34.5–45)
HGB BLD-MCNC: 14.2 G/DL — SIGNIFICANT CHANGE UP (ref 11.5–15.5)
IMM GRANULOCYTES NFR BLD AUTO: 0.2 % — SIGNIFICANT CHANGE UP (ref 0–0.9)
LYMPHOCYTES # BLD AUTO: 4.28 K/UL — HIGH (ref 1–3.3)
LYMPHOCYTES # BLD AUTO: 40 % — SIGNIFICANT CHANGE UP (ref 13–44)
MCHC RBC-ENTMCNC: 30 PG — SIGNIFICANT CHANGE UP (ref 27–34)
MCHC RBC-ENTMCNC: 33.3 G/DL — SIGNIFICANT CHANGE UP (ref 32–36)
MCV RBC AUTO: 90.3 FL — SIGNIFICANT CHANGE UP (ref 80–100)
MONOCYTES # BLD AUTO: 0.87 K/UL — SIGNIFICANT CHANGE UP (ref 0–0.9)
MONOCYTES NFR BLD AUTO: 8.1 % — SIGNIFICANT CHANGE UP (ref 2–14)
NEUTROPHILS # BLD AUTO: 4.7 K/UL — SIGNIFICANT CHANGE UP (ref 1.8–7.4)
NEUTROPHILS NFR BLD AUTO: 44 % — SIGNIFICANT CHANGE UP (ref 43–77)
NRBC BLD AUTO-RTO: 0 /100 WBCS — SIGNIFICANT CHANGE UP (ref 0–0)
PLATELET # BLD AUTO: 583 K/UL — HIGH (ref 150–400)
RBC # BLD: 4.73 M/UL — SIGNIFICANT CHANGE UP (ref 3.8–5.2)
RBC # FLD: 13.7 % — SIGNIFICANT CHANGE UP (ref 10.3–14.5)
WBC # BLD: 10.7 K/UL — HIGH (ref 3.8–10.5)
WBC # FLD AUTO: 10.7 K/UL — HIGH (ref 3.8–10.5)

## 2025-03-28 LAB
CANCER AG19-9 SERPL-ACNC: 7 U/ML
ERYTHROCYTE [SEDIMENTATION RATE] IN BLOOD BY WESTERGREN METHOD: 9 MM/HR
FERRITIN SERPL-MCNC: 64 NG/ML
IRON SATN MFR SERPL: 15 %
IRON SERPL-MCNC: 47 UG/DL
TIBC SERPL-MCNC: 307 UG/DL
UIBC SERPL-MCNC: 260 UG/DL

## 2025-04-01 DIAGNOSIS — D75.839 THROMBOCYTOSIS, UNSPECIFIED: ICD-10-CM

## 2025-04-01 LAB — JAK2 GENE MUT ANL BLD/T: NORMAL

## 2025-04-02 ENCOUNTER — NON-APPOINTMENT (OUTPATIENT)
Age: 50
End: 2025-04-02

## 2025-06-23 ENCOUNTER — APPOINTMENT (OUTPATIENT)
Dept: MRI IMAGING | Facility: CLINIC | Age: 50
End: 2025-06-23
Payer: COMMERCIAL

## 2025-06-23 ENCOUNTER — APPOINTMENT (OUTPATIENT)
Dept: CT IMAGING | Facility: CLINIC | Age: 50
End: 2025-06-23
Payer: COMMERCIAL

## 2025-06-23 ENCOUNTER — OUTPATIENT (OUTPATIENT)
Dept: OUTPATIENT SERVICES | Facility: HOSPITAL | Age: 50
LOS: 1 days | End: 2025-06-23
Payer: COMMERCIAL

## 2025-06-23 DIAGNOSIS — Z98.890 OTHER SPECIFIED POSTPROCEDURAL STATES: Chronic | ICD-10-CM

## 2025-06-23 DIAGNOSIS — Z00.8 ENCOUNTER FOR OTHER GENERAL EXAMINATION: ICD-10-CM

## 2025-06-23 DIAGNOSIS — Z15.01 GENETIC SUSCEPTIBILITY TO MALIGNANT NEOPLASM OF BREAST: ICD-10-CM

## 2025-06-23 DIAGNOSIS — Z45.2 ENCOUNTER FOR ADJUSTMENT AND MANAGEMENT OF VASCULAR ACCESS DEVICE: Chronic | ICD-10-CM

## 2025-06-23 DIAGNOSIS — C25.9 MALIGNANT NEOPLASM OF PANCREAS, UNSPECIFIED: ICD-10-CM

## 2025-06-23 PROCEDURE — 74183 MRI ABD W/O CNTR FLWD CNTR: CPT | Mod: 26

## 2025-06-23 PROCEDURE — 71250 CT THORAX DX C-: CPT | Mod: 26

## 2025-06-23 PROCEDURE — 72197 MRI PELVIS W/O & W/DYE: CPT | Mod: 26

## 2025-06-23 PROCEDURE — A9585: CPT

## 2025-06-23 PROCEDURE — 71250 CT THORAX DX C-: CPT

## 2025-06-23 PROCEDURE — 74183 MRI ABD W/O CNTR FLWD CNTR: CPT

## 2025-06-23 PROCEDURE — 72197 MRI PELVIS W/O & W/DYE: CPT

## 2025-06-26 ENCOUNTER — NON-APPOINTMENT (OUTPATIENT)
Age: 50
End: 2025-06-26

## 2025-07-11 ENCOUNTER — OUTPATIENT (OUTPATIENT)
Dept: OUTPATIENT SERVICES | Facility: HOSPITAL | Age: 50
LOS: 1 days | Discharge: HOME | End: 2025-07-11

## 2025-07-11 DIAGNOSIS — Z45.2 ENCOUNTER FOR ADJUSTMENT AND MANAGEMENT OF VASCULAR ACCESS DEVICE: Chronic | ICD-10-CM

## 2025-07-11 DIAGNOSIS — C25.9 MALIGNANT NEOPLASM OF PANCREAS, UNSPECIFIED: ICD-10-CM

## 2025-07-11 DIAGNOSIS — Z98.890 OTHER SPECIFIED POSTPROCEDURAL STATES: Chronic | ICD-10-CM

## 2025-07-14 ENCOUNTER — APPOINTMENT (OUTPATIENT)
Dept: HEMATOLOGY ONCOLOGY | Facility: CLINIC | Age: 50
End: 2025-07-14

## (undated) DEVICE — XI ARM FORCEP PROGRASP 8MM

## (undated) DEVICE — Device

## (undated) DEVICE — PREP BETADINE SPONGE STICKS

## (undated) DEVICE — GLV 7 PROTEXIS (WHITE)

## (undated) DEVICE — SI ELCTR SEALER DA VINCI VESSEL

## (undated) DEVICE — LIGASURE MARYLAND 37CM

## (undated) DEVICE — TROCAR COVIDIEN STEP 5MM SHORT 70MM

## (undated) DEVICE — GLV 8 PROTEXIS (WHITE)

## (undated) DEVICE — POSITIONER PURPLE ARM ONE STEP (LARGE)

## (undated) DEVICE — XI ARM NEEDLE DRIVER SUTURECUT MEGA 8MM

## (undated) DEVICE — GLV 7.5 PROTEXIS (WHITE)

## (undated) DEVICE — XI DRAPE ARM

## (undated) DEVICE — TROCAR SURGIQUEST AIRSEAL 8MMX100MM

## (undated) DEVICE — POSITIONER FOAM HEAD CRADLE (PINK)

## (undated) DEVICE — WARMING BLANKET UPPER ADULT

## (undated) DEVICE — PACK PERI GYN

## (undated) DEVICE — DRAPE GENERAL ENDOSCOPY

## (undated) DEVICE — DRAIN PENROSE .25" X 12" SILICONE

## (undated) DEVICE — DRAPE TOWEL BLUE 17" X 24"

## (undated) DEVICE — GLV 6 PROTEXIS (WHITE)

## (undated) DEVICE — LUBRICATING JELLY ONESHOT 1.25OZ

## (undated) DEVICE — POSITIONER FOAM EGG CRATE ULNAR 2PCS (PINK)

## (undated) DEVICE — LAPSAC SURGICAL TISSUE POUCH 8X10"

## (undated) DEVICE — BLADE SCALPEL SAFETYLOCK #10

## (undated) DEVICE — PACK ROBOTIC LIJ

## (undated) DEVICE — TUBING SUCTION 20FT

## (undated) DEVICE — DRAPE INSTRUMENT POUCH 6.75" X 11"

## (undated) DEVICE — DRAPE ISOLATION BAG 20X20"

## (undated) DEVICE — GOWN TRIMAX LG

## (undated) DEVICE — STAPLER COVIDIEN ENDO GIA SHORT HANDLE

## (undated) DEVICE — DRAIN PENROSE .5" X 18" LATEX

## (undated) DEVICE — DRSG OPSITE 13.75 X 4"

## (undated) DEVICE — DRAPE MAYO STAND 23"

## (undated) DEVICE — UTERINE MANIPULATOR CONMED VCARE SM 32MM

## (undated) DEVICE — XI ARM SCISSOR MONO CURVED

## (undated) DEVICE — SPECIMEN CONTAINER 100ML

## (undated) DEVICE — TROCAR APPLIED MEDICAL KII BALLOON BLUNT TIP 12MM X 130MM

## (undated) DEVICE — VISITEC 4X4

## (undated) DEVICE — DRSG STERISTRIPS 0.5 X 4"

## (undated) DEVICE — XI TIP COVER

## (undated) DEVICE — FOLEY TRAY 16FR 5CC LTX UMETER CLOSED

## (undated) DEVICE — SUT POLYSORB 0 30" GS-23

## (undated) DEVICE — VENODYNE/SCD SLEEVE CALF MEDIUM

## (undated) DEVICE — UTERINE MANIPULATOR CONMED VCARE MED 34MM

## (undated) DEVICE — INSUFFLATION NDL COVIDIEN STEP 14G FOR STEP/VERSASTEP

## (undated) DEVICE — XI ARM FORCEP FENESTRATED BIPOLAR 8MM

## (undated) DEVICE — SUT VICRYL 0 27" UR-6

## (undated) DEVICE — STAPLER SKIN VISI-STAT 35 WIDE

## (undated) DEVICE — ELCTR GROUNDING PAD ADULT COVIDIEN

## (undated) DEVICE — LIGASURE IMPACT

## (undated) DEVICE — SUT MONOCRYL 4-0 27" PS-2 UNDYED

## (undated) DEVICE — GLV 6.5 PROTEXIS (BLUE)

## (undated) DEVICE — GLV 6.5 PROTEXIS (WHITE)

## (undated) DEVICE — XI SEAL UNIV 5- 8 MM

## (undated) DEVICE — DRAPE MAYO STAND 30"

## (undated) DEVICE — DRSG VAC ABTHERS

## (undated) DEVICE — MARKING PEN W RULER

## (undated) DEVICE — TUBING IRR SET FOR CYSTOSCOPY 77"

## (undated) DEVICE — XI ARM NEEDLE DRIVER LARGE

## (undated) DEVICE — GOWN LG

## (undated) DEVICE — INSUFFLATION NDL COVIDIEN STEP 14G SHORT FOR STEP/VERSASTEP

## (undated) DEVICE — PACK D&C

## (undated) DEVICE — GOWN XL

## (undated) DEVICE — DRSG PREVENA PEEL & PLACE KIT 20CM

## (undated) DEVICE — SOL IRR POUR H2O 250ML

## (undated) DEVICE — TUBING AIRSEAL TRI-LUMEN FILTERED

## (undated) DEVICE — NDL HYPO REGULAR BEVEL 25G X 1.5" (BLUE)

## (undated) DEVICE — ENDOCATCH 10MM SPECIMEN POUCH

## (undated) DEVICE — DRAPE 3/4 SHEET 52X76"

## (undated) DEVICE — D HELP - CLEARVIEW CLEARIFY SYSTEM

## (undated) DEVICE — VESSEL LOOP ASPEN MINI RED

## (undated) DEVICE — DRAIN JACKSON PRATT 10MM FLAT FULL NO TROCAR

## (undated) DEVICE — DRSG TELFA 3 X 8

## (undated) DEVICE — GLV 8.5 PROTEXIS (WHITE)

## (undated) DEVICE — PACKING GAUZE IODOFORM 0.5"

## (undated) DEVICE — CANISTER SUCTION 2000CC

## (undated) DEVICE — PREP CHLORAPREP HI-LITE ORANGE 26ML

## (undated) DEVICE — BLADE SCALPEL SAFETYLOCK #15

## (undated) DEVICE — DRSG TEGADERM 6"X8"

## (undated) DEVICE — STAPLER COVIDIEN ENDO GIA STANDARD HANDLE

## (undated) DEVICE — SUT POLYSORB 0 60" TIES UNDYED

## (undated) DEVICE — DRSG MASTISOL

## (undated) DEVICE — NDL COUNTER FOAM AND MAGNET 10-20

## (undated) DEVICE — XI DRAPE COLUMN

## (undated) DEVICE — DRAIN CHANNEL 19FR ROUND FULL FLUTED

## (undated) DEVICE — TROCAR COVIDIEN VERSASTEP PLUS 11MM STANDARD

## (undated) DEVICE — TROCAR COVIDIEN VERSASTEP PLUS 12MM STANDARD

## (undated) DEVICE — TUBING INSUFFLATION LAP FILTER 10FT

## (undated) DEVICE — POSITIONER PINK PAD PIGAZZI SYSTEM

## (undated) DEVICE — TUBING STRYKEFLOW II SUCTION / IRRIGATOR

## (undated) DEVICE — TROCAR COVIDIEN BLUNT TIP HASSAN 12MM

## (undated) DEVICE — UTERINE MANIPULATOR COOPER SURGICAL 5MM 33CM GREEN

## (undated) DEVICE — SOL IRR POUR NS 0.9% 500ML

## (undated) DEVICE — SUT PDS II 0 18" ENDOLOOP LIGATURE

## (undated) DEVICE — UTERINE MANIPULATOR CONMED VCARE LG 37MM

## (undated) DEVICE — WARMING BLANKET LOWER ADULT

## (undated) DEVICE — ELCTR DISSECTOR ULTRASONIC CORDLESS CVD JAW 5MM-39CM

## (undated) DEVICE — LIGASURE EXACT DISSECTOR

## (undated) DEVICE — DRAIN RESERVOIR FOR JACKSON PRATT 100CC CARDINAL

## (undated) DEVICE — TUBING IRRIGATION DAVOL SYSTEM X STREAM

## (undated) DEVICE — TROCAR COVIDIEN VERSASTEP PLUS 15MM STANDARD

## (undated) DEVICE — MEDICATION LABELS W MARKER

## (undated) DEVICE — LIGASURE ATLAS 10MM 37CM

## (undated) DEVICE — PACK LAP CHOLE LAP APPENDECTOMY

## (undated) DEVICE — DRAPE LIGHT HANDLE COVER (BLUE)

## (undated) DEVICE — SYR ASEPTO

## (undated) DEVICE — LAP PAD 18 X 18"

## (undated) DEVICE — SHEARS COVIDIEN ENDO SHEAR 5MM X 31CM W UNIPOLAR CAUTERY

## (undated) DEVICE — VALVE YELLOW PORT SEAL PLUS 5MM

## (undated) DEVICE — XI OBTURATOR OPTICAL BLADELESS 8MM

## (undated) DEVICE — LIJ-ESU BOVIE MACHINE - ROBOTIC 11483366: Type: DURABLE MEDICAL EQUIPMENT

## (undated) DEVICE — SUT ENDOSTITCH DEVICE 10MM

## (undated) DEVICE — TROCAR APPLIED MEDICAL KII BALLOON BLUNT TIP 12MM X 100MM

## (undated) DEVICE — SCOPE WARMER SEAL DISP

## (undated) DEVICE — TRAP SPECIMEN SPUTUM 40CC

## (undated) DEVICE — SHEARS HARMONIC ACE 5MM X 36CM CURVED TIP

## (undated) DEVICE — MEDICINE CUP WITH LID 60ML

## (undated) DEVICE — DRAPE UNDER BUTTOCKS W SCREEN

## (undated) DEVICE — TROCAR COVIDIEN BLUNT TIP HASSAN 10MM

## (undated) DEVICE — TUBING UTERINE ASPIRATION 3/8" X 6FT W/O ADAPTER

## (undated) DEVICE — DRAPE 1/2 SHEET 40X57"

## (undated) DEVICE — LIGASURE BLUNT TIP 37CM

## (undated) DEVICE — INZII RETRIEVAL SYSTEM 5MM